# Patient Record
Sex: MALE | Race: WHITE | URBAN - METROPOLITAN AREA
[De-identification: names, ages, dates, MRNs, and addresses within clinical notes are randomized per-mention and may not be internally consistent; named-entity substitution may affect disease eponyms.]

---

## 2019-08-20 ENCOUNTER — APPOINTMENT (RX ONLY)
Dept: URBAN - METROPOLITAN AREA CLINIC 53 | Facility: CLINIC | Age: 69
Setting detail: DERMATOLOGY
End: 2019-08-20

## 2019-08-20 DIAGNOSIS — L81.4 OTHER MELANIN HYPERPIGMENTATION: ICD-10-CM

## 2019-08-20 DIAGNOSIS — Z85.828 PERSONAL HISTORY OF OTHER MALIGNANT NEOPLASM OF SKIN: ICD-10-CM | Status: RESOLVED

## 2019-08-20 DIAGNOSIS — L82.0 INFLAMED SEBORRHEIC KERATOSIS: ICD-10-CM

## 2019-08-20 PROBLEM — F41.9 ANXIETY DISORDER, UNSPECIFIED: Status: ACTIVE | Noted: 2019-08-20

## 2019-08-20 PROBLEM — D48.5 NEOPLASM OF UNCERTAIN BEHAVIOR OF SKIN: Status: ACTIVE | Noted: 2019-08-20

## 2019-08-20 PROCEDURE — ? BIOPSY BY SHAVE METHOD

## 2019-08-20 PROCEDURE — 99202 OFFICE O/P NEW SF 15 MIN: CPT | Mod: 25

## 2019-08-20 PROCEDURE — ? INVENTORY

## 2019-08-20 PROCEDURE — ? COUNSELING

## 2019-08-20 PROCEDURE — 11102 TANGNTL BX SKIN SINGLE LES: CPT | Mod: 59

## 2019-08-20 PROCEDURE — 17110 DESTRUCTION B9 LES UP TO 14: CPT

## 2019-08-20 PROCEDURE — ? LIQUID NITROGEN

## 2019-08-20 ASSESSMENT — LOCATION SIMPLE DESCRIPTION DERM
LOCATION SIMPLE: NOSE
LOCATION SIMPLE: UPPER BACK
LOCATION SIMPLE: RIGHT UPPER BACK
LOCATION SIMPLE: LEFT TEMPLE
LOCATION SIMPLE: CHEST
LOCATION SIMPLE: LEFT FOREHEAD

## 2019-08-20 ASSESSMENT — LOCATION DETAILED DESCRIPTION DERM
LOCATION DETAILED: SUPERIOR THORACIC SPINE
LOCATION DETAILED: RIGHT MEDIAL INFERIOR CHEST
LOCATION DETAILED: LEFT CENTRAL TEMPLE
LOCATION DETAILED: LEFT SUPERIOR FOREHEAD
LOCATION DETAILED: NASAL DORSUM
LOCATION DETAILED: RIGHT MID-UPPER BACK

## 2019-08-20 ASSESSMENT — LOCATION ZONE DERM
LOCATION ZONE: TRUNK
LOCATION ZONE: NOSE
LOCATION ZONE: FACE

## 2019-08-20 NOTE — PROCEDURE: LIQUID NITROGEN
Detail Level: Detailed
Consent: The patient's consent was obtained including but not limited to risks of crusting, scabbing, blistering, scarring, darker or lighter pigmentary change, recurrence, incomplete removal and infection.
Add 52 Modifier (Optional): no
Medical Necessity Clause: This procedure was medically necessary because the lesions that were treated were:
Number Of Freeze-Thaw Cycles: 2 freeze-thaw cycles
Medical Necessity Information: It is in your best interest to select a reason for this procedure from the list below. All of these items fulfill various CMS LCD requirements except the new and changing color options.
Post-Care Instructions: I reviewed with the patient in detail post-care instructions. Patient is to wear sunprotection, and avoid picking at any of the treated lesions. Pt may apply Vaseline to crusted or scabbing areas.

## 2019-08-20 NOTE — PROCEDURE: BIOPSY BY SHAVE METHOD
Billing Type: Third-Party Bill
Lab: 6
Detail Level: Detailed
Biopsy Method: double edge Personna blade
Curettage Text: The wound bed was treated with curettage after the biopsy was performed.
Render Path Notes In Note?: No
Hemostasis: Electrocautery and Aluminum Chloride
Was A Bandage Applied: Yes
Silver Nitrate Text: The wound bed was treated with silver nitrate after the biopsy was performed.
Dressing: bandage
Anesthesia Volume In Cc (Will Not Render If 0): 0.5
Electrodesiccation And Curettage Text: The wound bed was treated with electrodesiccation and curettage after the biopsy was performed.
Type Of Destruction Used: Curettage
Consent: Written consent was obtained and risks were reviewed including but not limited to scarring, infection, bleeding, scabbing, incomplete removal, nerve damage and allergy to anesthesia.
X Size Of Lesion In Cm: 0
Biopsy Type: H and E
Depth Of Biopsy: dermis
Electrodesiccation Text: The wound bed was treated with electrodesiccation after the biopsy was performed.
Notification Instructions: Patient will be notified of biopsy results. However, patient instructed to call the office if not contacted within 2 weeks.
Wound Care: Petrolatum
Anesthesia Type: 1% lidocaine with epinephrine
Lab Facility: 3
Post-Care Instructions: I reviewed with the patient in detail post-care instructions. Patient is to keep the biopsy site dry overnight, and then apply bacitracin twice daily until healed. Patient may apply hydrogen peroxide soaks to remove any crusting.
Cryotherapy Text: The wound bed was treated with cryotherapy after the biopsy was performed.

## 2020-02-21 ENCOUNTER — APPOINTMENT (RX ONLY)
Dept: URBAN - METROPOLITAN AREA CLINIC 53 | Facility: CLINIC | Age: 70
Setting detail: DERMATOLOGY
End: 2020-02-21

## 2020-02-21 DIAGNOSIS — Z41.9 ENCOUNTER FOR PROCEDURE FOR PURPOSES OTHER THAN REMEDYING HEALTH STATE, UNSPECIFIED: ICD-10-CM

## 2020-02-21 PROCEDURE — ? DIAGNOSIS COMMENT

## 2020-02-21 PROCEDURE — ? FILLERS

## 2020-02-21 PROCEDURE — ? BOTOX (U OR CC)

## 2020-02-21 ASSESSMENT — LOCATION DETAILED DESCRIPTION DERM
LOCATION DETAILED: GLABELLA
LOCATION DETAILED: LEFT SUPERIOR MEDIAL FOREHEAD
LOCATION DETAILED: LEFT INFERIOR FOREHEAD
LOCATION DETAILED: RIGHT LOWER CUTANEOUS LIP
LOCATION DETAILED: RIGHT SUPERIOR MEDIAL FOREHEAD
LOCATION DETAILED: LEFT SUPERIOR CENTRAL MALAR CHEEK
LOCATION DETAILED: LEFT INFERIOR TEMPLE
LOCATION DETAILED: LEFT LOWER CUTANEOUS LIP
LOCATION DETAILED: RIGHT INFERIOR MEDIAL FOREHEAD
LOCATION DETAILED: LEFT INFERIOR MEDIAL FOREHEAD
LOCATION DETAILED: RIGHT INFERIOR FOREHEAD
LOCATION DETAILED: RIGHT SUPERIOR CENTRAL MALAR CHEEK
LOCATION DETAILED: RIGHT INFERIOR TEMPLE
LOCATION DETAILED: RIGHT SUPERIOR VERMILION LIP

## 2020-02-21 ASSESSMENT — LOCATION SIMPLE DESCRIPTION DERM
LOCATION SIMPLE: RIGHT TEMPLE
LOCATION SIMPLE: LEFT TEMPLE
LOCATION SIMPLE: RIGHT CHEEK
LOCATION SIMPLE: RIGHT FOREHEAD
LOCATION SIMPLE: LEFT CHEEK
LOCATION SIMPLE: LEFT LIP
LOCATION SIMPLE: GLABELLA
LOCATION SIMPLE: RIGHT LIP
LOCATION SIMPLE: LEFT FOREHEAD

## 2020-02-21 ASSESSMENT — LOCATION ZONE DERM
LOCATION ZONE: FACE
LOCATION ZONE: LIP

## 2020-02-21 NOTE — PROCEDURE: BOTOX (U OR CC)
Dilution (U/0.1 Cc): 1.1
Detail Level: Detailed
Price Per Unit Or Per Cc In $ (Use Numbers Only, No Special Characters Or $): 12.00
Consent: Written consent obtained. Risks include but not limited to lid/brow ptosis, bruising, swelling, diplopia, temporary effect, incomplete chemical denervation.
Post-Care Instructions: Instructions after Botox:\\nDo not lay flat for 4 hours after procedure. Limit physical activity for 24 hours. No heavy lifting (15lbs or greater) or bending (putting head below level of heart) for the remainder of the day. Do not apply pressure or compression to areas treated for 24 hours. Avoid facials or saunas for 24 hours after your treatment. Do not apply heat for 24 hours. Avoid traveling by airplane for 48 hours. Try to avoid make-up and skincare products for the remainder of the day to minimize risk for infection. If you need to apply make-up within 4 hours after your treatment, only use a GENTLE touch to avoid rubbing the treated area.\\n\\nFeel free to shower and go about most other regular daily activities. Be assured that any tiny bumps or marks will go away within a few hours.  Results may take up to 14 days to take full effect. Please wait until the 14 days have passed before assessing if you are pleased with the result. \\n\\nBotox is a temporary procedure. You may find that your treatment results will last approximately 3-4 months. If you maintain your treatment appointments with routine frequency, the duration of each treatment may last longer than 4 months. With routine follow-up treatments, we are able to create the best clinical results for you.
Additional Area 1 Location: mouth
Measure In Units Or Cc's?: units
Quantity Per Injection Site (Units): 2
Quantity Per Injection Site (Units): 15
Quantity Per Injection Site (Units): 12
Quantity Per Injection Site (Units): 4
Quantity Per Injection Site (Units): 10

## 2020-02-21 NOTE — PROCEDURE: FILLERS
Price (Use Numbers Only, No Special Characters Or $): 9818 Price (Use Numbers Only, No Special Characters Or $): 6006

## 2021-08-09 ENCOUNTER — APPOINTMENT (RX ONLY)
Dept: URBAN - METROPOLITAN AREA CLINIC 53 | Facility: CLINIC | Age: 71
Setting detail: DERMATOLOGY
End: 2021-08-09

## 2021-08-09 DIAGNOSIS — L21.8 OTHER SEBORRHEIC DERMATITIS: ICD-10-CM | Status: INADEQUATELY CONTROLLED

## 2021-08-09 PROBLEM — D48.5 NEOPLASM OF UNCERTAIN BEHAVIOR OF SKIN: Status: ACTIVE | Noted: 2021-08-09

## 2021-08-09 PROCEDURE — ? RECOMMENDATIONS

## 2021-08-09 PROCEDURE — ? ADDITIONAL NOTES

## 2021-08-09 PROCEDURE — ? COUNSELING

## 2021-08-09 PROCEDURE — 99213 OFFICE O/P EST LOW 20 MIN: CPT

## 2021-08-09 PROCEDURE — ? PRESCRIPTION

## 2021-08-09 PROCEDURE — ? TREATMENT REGIMEN

## 2021-08-09 RX ORDER — KETOCONAZOLE 20 MG/G
1 CREAM TOPICAL BID
Qty: 1 | Refills: 2 | Status: ERX | COMMUNITY
Start: 2021-08-09

## 2021-08-09 RX ADMIN — KETOCONAZOLE 1: 20 CREAM TOPICAL at 00:00

## 2021-08-09 ASSESSMENT — LOCATION DETAILED DESCRIPTION DERM: LOCATION DETAILED: LEFT INFERIOR CENTRAL MALAR CHEEK

## 2021-08-09 ASSESSMENT — LOCATION SIMPLE DESCRIPTION DERM: LOCATION SIMPLE: LEFT CHEEK

## 2021-08-09 ASSESSMENT — LOCATION ZONE DERM: LOCATION ZONE: FACE

## 2021-08-09 NOTE — PROCEDURE: RECOMMENDATIONS
Recommendation Preamble: Sunscreen daily with SPF 30+
Recommendations (Free Text): Pt to be scheduled to have MOHS procedure.  Pathology in attachments
Render Risk Assessment In Note?: no
Detail Level: Zone

## 2021-08-10 ENCOUNTER — APPOINTMENT (RX ONLY)
Dept: URBAN - METROPOLITAN AREA CLINIC 379 | Facility: CLINIC | Age: 71
Setting detail: DERMATOLOGY
End: 2021-08-10

## 2021-08-19 ENCOUNTER — APPOINTMENT (RX ONLY)
Dept: URBAN - METROPOLITAN AREA CLINIC 53 | Facility: CLINIC | Age: 71
Setting detail: DERMATOLOGY
End: 2021-08-19

## 2021-08-19 DIAGNOSIS — L21.8 OTHER SEBORRHEIC DERMATITIS: ICD-10-CM

## 2021-08-19 PROCEDURE — ? PRESCRIPTION

## 2021-08-19 RX ORDER — TRIAMCINOLONE ACETONIDE 1 MG/G
CREAM TOPICAL BID
Qty: 1 | Refills: 1 | Status: ERX | COMMUNITY
Start: 2021-08-19

## 2021-08-19 RX ADMIN — TRIAMCINOLONE ACETONIDE: 1 CREAM TOPICAL at 00:00

## 2021-08-24 ENCOUNTER — APPOINTMENT (RX ONLY)
Dept: URBAN - METROPOLITAN AREA CLINIC 53 | Facility: CLINIC | Age: 71
Setting detail: DERMATOLOGY
End: 2021-08-24

## 2021-08-24 DIAGNOSIS — L71.8 OTHER ROSACEA: ICD-10-CM

## 2021-08-24 PROCEDURE — ? PRESCRIPTION

## 2021-08-24 RX ORDER — AZELAIC ACID 0.15 G/G
1 GEL TOPICAL BID
Qty: 1 | Refills: 1 | Status: ERX | COMMUNITY
Start: 2021-08-24

## 2021-08-24 RX ADMIN — AZELAIC ACID 1: 0.15 GEL TOPICAL at 00:00

## 2021-09-09 ENCOUNTER — APPOINTMENT (RX ONLY)
Dept: URBAN - METROPOLITAN AREA CLINIC 53 | Facility: CLINIC | Age: 71
Setting detail: DERMATOLOGY
End: 2021-09-09

## 2021-09-09 VITALS — SYSTOLIC BLOOD PRESSURE: 104 MMHG | HEART RATE: 73 BPM | DIASTOLIC BLOOD PRESSURE: 60 MMHG

## 2021-09-09 PROBLEM — C44.319 BASAL CELL CARCINOMA OF SKIN OF OTHER PARTS OF FACE: Status: ACTIVE | Noted: 2021-09-09

## 2021-09-09 PROBLEM — C44.91 BASAL CELL CARCINOMA OF SKIN, UNSPECIFIED: Status: ACTIVE | Noted: 2021-09-09

## 2021-09-09 PROCEDURE — ? REPAIR NOTE

## 2021-09-09 PROCEDURE — 17311 MOHS 1 STAGE H/N/HF/G: CPT

## 2021-09-09 PROCEDURE — ? MOHS SURGERY

## 2021-09-09 PROCEDURE — ? CONSULTATION FOR SURGICAL REPAIR

## 2021-09-09 PROCEDURE — 99213 OFFICE O/P EST LOW 20 MIN: CPT | Mod: 25

## 2021-09-09 PROCEDURE — 13132 CMPLX RPR F/C/C/M/N/AX/G/H/F: CPT

## 2021-09-09 NOTE — PROCEDURE: REPAIR NOTE
Consent: The rationale for the repair was explained to the patient and consent was obtained. The risks, benefits and alternatives to therapy were discussed, including but not limited to, the risks of infection, scarring, bleeding, prolonged wound healing, partial or full flap loss/necrosis, venous congestion, contour deformity/dog ears, hypertrophic/keloid scar, damage to nearby structures, incomplete removal, allergy to anesthesia, nerve injury, need for further procedure, and recurrence were addressed. Prior to the procedure, the treatment site was clearly identified and confirmed by the patient. All components of Universal Protocol/PAUSE Rule completed.

## 2021-09-14 ENCOUNTER — APPOINTMENT (RX ONLY)
Dept: URBAN - METROPOLITAN AREA CLINIC 53 | Facility: CLINIC | Age: 71
Setting detail: DERMATOLOGY
End: 2021-09-14

## 2021-09-14 DIAGNOSIS — D22 MELANOCYTIC NEVI: ICD-10-CM | Status: STABLE

## 2021-09-14 DIAGNOSIS — L81.4 OTHER MELANIN HYPERPIGMENTATION: ICD-10-CM | Status: STABLE

## 2021-09-14 DIAGNOSIS — L57.8 OTHER SKIN CHANGES DUE TO CHRONIC EXPOSURE TO NONIONIZING RADIATION: ICD-10-CM

## 2021-09-14 DIAGNOSIS — L21.8 OTHER SEBORRHEIC DERMATITIS: ICD-10-CM | Status: WORSENING

## 2021-09-14 DIAGNOSIS — L57.0 ACTINIC KERATOSIS: ICD-10-CM

## 2021-09-14 DIAGNOSIS — D18.0 HEMANGIOMA: ICD-10-CM | Status: STABLE

## 2021-09-14 DIAGNOSIS — L82.1 OTHER SEBORRHEIC KERATOSIS: ICD-10-CM | Status: STABLE

## 2021-09-14 PROBLEM — D22.62 MELANOCYTIC NEVI OF LEFT UPPER LIMB, INCLUDING SHOULDER: Status: ACTIVE | Noted: 2021-09-14

## 2021-09-14 PROBLEM — D18.01 HEMANGIOMA OF SKIN AND SUBCUTANEOUS TISSUE: Status: ACTIVE | Noted: 2021-09-14

## 2021-09-14 PROBLEM — L30.9 DERMATITIS, UNSPECIFIED: Status: ACTIVE | Noted: 2021-09-14

## 2021-09-14 PROCEDURE — ? SUNSCREEN RECOMMENDATIONS

## 2021-09-14 PROCEDURE — ? COUNSELING

## 2021-09-14 PROCEDURE — 11102 TANGNTL BX SKIN SINGLE LES: CPT | Mod: 79

## 2021-09-14 PROCEDURE — 17000 DESTRUCT PREMALG LESION: CPT | Mod: 59,79

## 2021-09-14 PROCEDURE — ? BIOPSY BY SHAVE METHOD

## 2021-09-14 PROCEDURE — ? LIQUID NITROGEN

## 2021-09-14 PROCEDURE — ? ADDITIONAL NOTES

## 2021-09-14 PROCEDURE — 99213 OFFICE O/P EST LOW 20 MIN: CPT | Mod: 24,25

## 2021-09-14 PROCEDURE — 17003 DESTRUCT PREMALG LES 2-14: CPT | Mod: 79

## 2021-09-14 PROCEDURE — ? FULL BODY SKIN EXAM

## 2021-09-14 ASSESSMENT — LOCATION DETAILED DESCRIPTION DERM
LOCATION DETAILED: EPIGASTRIC SKIN
LOCATION DETAILED: MID-OCCIPITAL SCALP
LOCATION DETAILED: LEFT SUPERIOR OCCIPITAL SCALP
LOCATION DETAILED: LEFT LATERAL MANDIBULAR CHEEK
LOCATION DETAILED: LEFT SUPERIOR PARIETAL SCALP
LOCATION DETAILED: LEFT CENTRAL MALAR CHEEK
LOCATION DETAILED: LEFT PROXIMAL DORSAL FOREARM
LOCATION DETAILED: RIGHT POSTERIOR SHOULDER
LOCATION DETAILED: LEFT LATERAL TEMPLE
LOCATION DETAILED: LEFT INFERIOR UPPER BACK
LOCATION DETAILED: LEFT INFERIOR CENTRAL MALAR CHEEK

## 2021-09-14 ASSESSMENT — LOCATION SIMPLE DESCRIPTION DERM
LOCATION SIMPLE: LEFT OCCIPITAL SCALP
LOCATION SIMPLE: LEFT UPPER BACK
LOCATION SIMPLE: LEFT CHEEK
LOCATION SIMPLE: SCALP
LOCATION SIMPLE: LEFT FOREARM
LOCATION SIMPLE: LEFT TEMPLE
LOCATION SIMPLE: ABDOMEN
LOCATION SIMPLE: POSTERIOR SCALP
LOCATION SIMPLE: RIGHT SHOULDER

## 2021-09-14 ASSESSMENT — LOCATION ZONE DERM
LOCATION ZONE: ARM
LOCATION ZONE: TRUNK
LOCATION ZONE: FACE
LOCATION ZONE: SCALP

## 2021-09-14 NOTE — PROCEDURE: LIQUID NITROGEN
Render Note In Bullet Format When Appropriate: No
Post-Care Instructions: I reviewed with the patient in detail post-care instructions. Patient is to wear sunprotection, and avoid picking at any of the treated lesions. Pt may apply Vaseline to crusted or scabbing areas.
Duration Of Freeze Thaw-Cycle (Seconds): 3
Render Post-Care Instructions In Note?: yes
Number Of Freeze-Thaw Cycles: 1 freeze-thaw cycle
Detail Level: Detailed
Consent: The patient's consent was obtained including but not limited to risks of crusting, scabbing, blistering, scarring, darker or lighter pigmentary change, recurrence, incomplete removal and infection.

## 2021-09-14 NOTE — PROCEDURE: BIOPSY BY SHAVE METHOD
Detail Level: Detailed
Depth Of Biopsy: dermis
Was A Bandage Applied: Yes
Size Of Lesion In Cm: 0
Biopsy Type: H and E
Biopsy Method: double edge Personna blade
Anesthesia Type: 1% lidocaine with epinephrine
Anesthesia Volume In Cc (Will Not Render If 0): 0.5
Hemostasis: Aluminum Chloride and Electrocautery
Wound Care: Petrolatum
Dressing: bandage
Type Of Destruction Used: Electrodesiccation
Curettage Text: The wound bed was treated with curettage after the biopsy was performed.
Cryotherapy Text: The wound bed was treated with cryotherapy after the biopsy was performed.
Electrodesiccation Text: The wound bed was treated with electrodesiccation after the biopsy was performed.
Electrodesiccation And Curettage Text: The wound bed was treated with electrodesiccation and curettage after the biopsy was performed.
Silver Nitrate Text: The wound bed was treated with silver nitrate after the biopsy was performed.
Lab: 6
Lab Facility: 3
Render Path Notes In Note?: No
Consent: Written consent was obtained and risks were reviewed including but not limited to scarring, infection, bleeding, scabbing, incomplete removal, nerve damage and allergy to anesthesia.
Post-Care Instructions: I reviewed with the patient in detail post-care instructions. Patient is to keep the biopsy site dry overnight, and then apply Vaseline or Aquaphor daily with bandage until healed. Wash daily with gentle soap and water. Patient may apply hydrogen peroxide soaks to remove any crusting.
Notification Instructions: Patient will be notified of biopsy results. However, patient instructed to call the office if not contacted within 2 weeks.
Billing Type: Third-Party Bill
Information: Selecting Yes will display possible errors in your note based on the variables you have selected. This validation is only offered as a suggestion for you. PLEASE NOTE THAT THE VALIDATION TEXT WILL BE REMOVED WHEN YOU FINALIZE YOUR NOTE. IF YOU WANT TO FAX A PRELIMINARY NOTE YOU WILL NEED TO TOGGLE THIS TO 'NO' IF YOU DO NOT WANT IT IN YOUR FAXED NOTE.

## 2021-09-20 ENCOUNTER — RX ONLY (OUTPATIENT)
Age: 71
Setting detail: RX ONLY
End: 2021-09-20

## 2021-09-20 RX ORDER — PIMECROLIMUS 10 MG/G
1 CREAM TOPICAL BID
Qty: 60 | Refills: 0 | Status: ERX | COMMUNITY
Start: 2021-09-20

## 2021-09-23 ENCOUNTER — APPOINTMENT (RX ONLY)
Dept: URBAN - METROPOLITAN AREA CLINIC 53 | Facility: CLINIC | Age: 71
Setting detail: DERMATOLOGY
End: 2021-09-23

## 2021-09-23 PROBLEM — L21.9 SEBORRHEIC DERMATITIS, UNSPECIFIED: Status: ACTIVE | Noted: 2021-09-23

## 2021-09-23 PROCEDURE — ? PRESCRIPTION

## 2021-09-23 RX ORDER — NYSTATIN AND TRIAMCINOLONE ACETONIDE 100000; 1 [USP'U]/G; MG/G
CREAM TOPICAL BID
Qty: 60 | Refills: 2 | Status: ERX | COMMUNITY
Start: 2021-09-23

## 2021-09-23 RX ADMIN — NYSTATIN AND TRIAMCINOLONE ACETONIDE: 100000; 1 CREAM TOPICAL at 00:00

## 2021-09-24 ENCOUNTER — APPOINTMENT (RX ONLY)
Dept: URBAN - METROPOLITAN AREA CLINIC 53 | Facility: CLINIC | Age: 71
Setting detail: DERMATOLOGY
End: 2021-09-24

## 2021-09-24 DIAGNOSIS — Z48.817 ENCOUNTER FOR SURGICAL AFTERCARE FOLLOWING SURGERY ON THE SKIN AND SUBCUTANEOUS TISSUE: ICD-10-CM

## 2021-09-24 PROCEDURE — 99024 POSTOP FOLLOW-UP VISIT: CPT

## 2021-09-24 PROCEDURE — ? POST-OP WOUND CHECK

## 2021-09-24 ASSESSMENT — LOCATION SIMPLE DESCRIPTION DERM: LOCATION SIMPLE: LEFT FOREHEAD

## 2021-09-24 ASSESSMENT — LOCATION ZONE DERM: LOCATION ZONE: FACE

## 2021-09-24 ASSESSMENT — LOCATION DETAILED DESCRIPTION DERM: LOCATION DETAILED: LEFT LATERAL FOREHEAD

## 2021-09-24 NOTE — PROCEDURE: POST-OP WOUND CHECK
Detail Level: Detailed
Add 74586 Cpt? (Important Note: In 2017 The Use Of 63194 Is Being Tracked By Cms To Determine Future Global Period Reimbursement For Global Periods): yes

## 2021-10-15 ENCOUNTER — APPOINTMENT (RX ONLY)
Dept: URBAN - METROPOLITAN AREA CLINIC 61 | Facility: CLINIC | Age: 71
Setting detail: DERMATOLOGY
End: 2021-10-15

## 2021-10-15 DIAGNOSIS — Z48.817 ENCOUNTER FOR SURGICAL AFTERCARE FOLLOWING SURGERY ON THE SKIN AND SUBCUTANEOUS TISSUE: ICD-10-CM

## 2021-10-15 PROCEDURE — 99024 POSTOP FOLLOW-UP VISIT: CPT

## 2021-10-15 PROCEDURE — ? POST-OP WOUND CHECK

## 2021-10-15 ASSESSMENT — LOCATION DETAILED DESCRIPTION DERM: LOCATION DETAILED: LEFT LATERAL FOREHEAD

## 2021-10-15 ASSESSMENT — LOCATION SIMPLE DESCRIPTION DERM: LOCATION SIMPLE: LEFT FOREHEAD

## 2021-10-15 ASSESSMENT — LOCATION ZONE DERM: LOCATION ZONE: FACE

## 2021-10-15 NOTE — PROCEDURE: POST-OP WOUND CHECK
Detail Level: Detailed
Add 05017 Cpt? (Important Note: In 2017 The Use Of 33684 Is Being Tracked By Cms To Determine Future Global Period Reimbursement For Global Periods): yes
Wound Evaluated By: Rika Arteaga by Dr Beckford.

## 2021-11-11 ENCOUNTER — APPOINTMENT (RX ONLY)
Dept: URBAN - METROPOLITAN AREA CLINIC 53 | Facility: CLINIC | Age: 71
Setting detail: DERMATOLOGY
End: 2021-11-11

## 2021-11-11 DIAGNOSIS — D18.0 HEMANGIOMA: ICD-10-CM | Status: STABLE

## 2021-11-11 DIAGNOSIS — L57.8 OTHER SKIN CHANGES DUE TO CHRONIC EXPOSURE TO NONIONIZING RADIATION: ICD-10-CM

## 2021-11-11 DIAGNOSIS — L81.4 OTHER MELANIN HYPERPIGMENTATION: ICD-10-CM | Status: STABLE

## 2021-11-11 DIAGNOSIS — L82.1 OTHER SEBORRHEIC KERATOSIS: ICD-10-CM | Status: STABLE

## 2021-11-11 DIAGNOSIS — L57.0 ACTINIC KERATOSIS: ICD-10-CM

## 2021-11-11 DIAGNOSIS — Z85.828 PERSONAL HISTORY OF OTHER MALIGNANT NEOPLASM OF SKIN: ICD-10-CM | Status: RESOLVED

## 2021-11-11 DIAGNOSIS — D22 MELANOCYTIC NEVI: ICD-10-CM | Status: STABLE

## 2021-11-11 PROBLEM — D18.01 HEMANGIOMA OF SKIN AND SUBCUTANEOUS TISSUE: Status: ACTIVE | Noted: 2021-11-11

## 2021-11-11 PROBLEM — D48.5 NEOPLASM OF UNCERTAIN BEHAVIOR OF SKIN: Status: ACTIVE | Noted: 2021-11-11

## 2021-11-11 PROBLEM — D22.62 MELANOCYTIC NEVI OF LEFT UPPER LIMB, INCLUDING SHOULDER: Status: ACTIVE | Noted: 2021-11-11

## 2021-11-11 PROCEDURE — ? COUNSELING

## 2021-11-11 PROCEDURE — ? FULL BODY SKIN EXAM

## 2021-11-11 PROCEDURE — ? SUNSCREEN RECOMMENDATIONS

## 2021-11-11 PROCEDURE — 99213 OFFICE O/P EST LOW 20 MIN: CPT | Mod: 25

## 2021-11-11 PROCEDURE — ? ADDITIONAL NOTES

## 2021-11-11 PROCEDURE — ? LIQUID NITROGEN

## 2021-11-11 PROCEDURE — 17000 DESTRUCT PREMALG LESION: CPT | Mod: 59

## 2021-11-11 PROCEDURE — 11102 TANGNTL BX SKIN SINGLE LES: CPT

## 2021-11-11 PROCEDURE — 11103 TANGNTL BX SKIN EA SEP/ADDL: CPT

## 2021-11-11 PROCEDURE — 17003 DESTRUCT PREMALG LES 2-14: CPT

## 2021-11-11 PROCEDURE — ? BIOPSY BY SHAVE METHOD

## 2021-11-11 ASSESSMENT — LOCATION SIMPLE DESCRIPTION DERM
LOCATION SIMPLE: RIGHT EAR
LOCATION SIMPLE: ABDOMEN
LOCATION SIMPLE: LEFT FOREHEAD
LOCATION SIMPLE: LEFT UPPER BACK
LOCATION SIMPLE: LEFT CHEEK
LOCATION SIMPLE: RIGHT FOREHEAD
LOCATION SIMPLE: RIGHT SHOULDER
LOCATION SIMPLE: LEFT EAR
LOCATION SIMPLE: LEFT FOREARM

## 2021-11-11 ASSESSMENT — LOCATION DETAILED DESCRIPTION DERM
LOCATION DETAILED: LEFT INFERIOR LATERAL FOREHEAD
LOCATION DETAILED: RIGHT POSTERIOR SHOULDER
LOCATION DETAILED: LEFT CENTRAL MALAR CHEEK
LOCATION DETAILED: EPIGASTRIC SKIN
LOCATION DETAILED: LEFT SUPERIOR PREAURICULAR CHEEK
LOCATION DETAILED: RIGHT INFERIOR FOREHEAD
LOCATION DETAILED: RIGHT INFERIOR HELIX
LOCATION DETAILED: LEFT SUPERIOR FOREHEAD
LOCATION DETAILED: LEFT INFERIOR UPPER BACK
LOCATION DETAILED: RIGHT INFERIOR MEDIAL FOREHEAD
LOCATION DETAILED: LEFT TRAGUS
LOCATION DETAILED: LEFT SUPERIOR MEDIAL FOREHEAD
LOCATION DETAILED: LEFT SUPERIOR CENTRAL MALAR CHEEK
LOCATION DETAILED: LEFT PROXIMAL DORSAL FOREARM

## 2021-11-11 ASSESSMENT — LOCATION ZONE DERM
LOCATION ZONE: EAR
LOCATION ZONE: FACE
LOCATION ZONE: TRUNK
LOCATION ZONE: ARM

## 2021-11-11 NOTE — PROCEDURE: BIOPSY BY SHAVE METHOD
Detail Level: Detailed
Depth Of Biopsy: dermis
Was A Bandage Applied: Yes
Size Of Lesion In Cm: 0.5
Biopsy Type: H and E
Biopsy Method: double edge Personna blade
Anesthesia Type: 1% lidocaine with epinephrine
Additional Anesthesia Volume In Cc (Will Not Render If 0): 0
Hemostasis: Aluminum Chloride and Electrocautery
Wound Care: Petrolatum
Dressing: bandage
Type Of Destruction Used: Electrodesiccation
Curettage Text: The wound bed was treated with curettage after the biopsy was performed.
Cryotherapy Text: The wound bed was treated with cryotherapy after the biopsy was performed.
Electrodesiccation Text: The wound bed was treated with electrodesiccation after the biopsy was performed.
Electrodesiccation And Curettage Text: The wound bed was treated with electrodesiccation and curettage after the biopsy was performed.
Silver Nitrate Text: The wound bed was treated with silver nitrate after the biopsy was performed.
Lab: 6
Lab Facility: 3
Render Path Notes In Note?: No
Consent: Written consent was obtained and risks were reviewed including but not limited to scarring, infection, bleeding, scabbing, incomplete removal, nerve damage and allergy to anesthesia.
Post-Care Instructions: I reviewed with the patient in detail post-care instructions. Patient is to keep the biopsy site dry overnight, and then apply Vaseline or Aquaphor daily with bandage until healed. Wash daily with gentle soap and water. Patient may apply hydrogen peroxide soaks to remove any crusting.
Notification Instructions: Patient will be notified of biopsy results. However, patient instructed to call the office if not contacted within 2 weeks.
Billing Type: Third-Party Bill
Information: Selecting Yes will display possible errors in your note based on the variables you have selected. This validation is only offered as a suggestion for you. PLEASE NOTE THAT THE VALIDATION TEXT WILL BE REMOVED WHEN YOU FINALIZE YOUR NOTE. IF YOU WANT TO FAX A PRELIMINARY NOTE YOU WILL NEED TO TOGGLE THIS TO 'NO' IF YOU DO NOT WANT IT IN YOUR FAXED NOTE.
X Size Of Lesion In Cm: 0.3

## 2021-11-11 NOTE — PROCEDURE: LIQUID NITROGEN
Render Post-Care Instructions In Note?: yes
Number Of Freeze-Thaw Cycles: 1 freeze-thaw cycle
Detail Level: Detailed
Consent: The patient's consent was obtained including but not limited to risks of crusting, scabbing, blistering, scarring, darker or lighter pigmentary change, recurrence, incomplete removal and infection.
Render Note In Bullet Format When Appropriate: No
Post-Care Instructions: I reviewed with the patient in detail post-care instructions. Patient is to wear sunprotection, and avoid picking at any of the treated lesions. Pt may apply Vaseline to crusted or scabbing areas.
Duration Of Freeze Thaw-Cycle (Seconds): 3

## 2022-04-07 ENCOUNTER — NON-APPOINTMENT (OUTPATIENT)
Age: 72
End: 2022-04-07

## 2022-04-12 ENCOUNTER — APPOINTMENT (OUTPATIENT)
Dept: ORTHOPEDIC SURGERY | Facility: CLINIC | Age: 72
End: 2022-04-12
Payer: MEDICARE

## 2022-04-12 VITALS
WEIGHT: 165 LBS | HEART RATE: 74 BPM | HEIGHT: 67 IN | BODY MASS INDEX: 25.9 KG/M2 | DIASTOLIC BLOOD PRESSURE: 81 MMHG | SYSTOLIC BLOOD PRESSURE: 137 MMHG

## 2022-04-12 DIAGNOSIS — M54.6 PAIN IN THORACIC SPINE: ICD-10-CM

## 2022-04-12 PROCEDURE — 72100 X-RAY EXAM L-S SPINE 2/3 VWS: CPT

## 2022-04-12 PROCEDURE — 99205 OFFICE O/P NEW HI 60 MIN: CPT

## 2022-04-16 ENCOUNTER — APPOINTMENT (OUTPATIENT)
Dept: CT IMAGING | Facility: CLINIC | Age: 72
End: 2022-04-16
Payer: MEDICARE

## 2022-04-16 ENCOUNTER — RESULT REVIEW (OUTPATIENT)
Age: 72
End: 2022-04-16

## 2022-04-16 ENCOUNTER — OUTPATIENT (OUTPATIENT)
Dept: OUTPATIENT SERVICES | Facility: HOSPITAL | Age: 72
LOS: 1 days | End: 2022-04-16
Payer: MEDICARE

## 2022-04-16 DIAGNOSIS — M51.36 OTHER INTERVERTEBRAL DISC DEGENERATION, LUMBAR REGION: ICD-10-CM

## 2022-04-16 PROCEDURE — 72131 CT LUMBAR SPINE W/O DYE: CPT | Mod: ME

## 2022-04-16 PROCEDURE — G1004: CPT

## 2022-04-16 PROCEDURE — 72131 CT LUMBAR SPINE W/O DYE: CPT | Mod: 26,ME

## 2022-04-20 ENCOUNTER — APPOINTMENT (OUTPATIENT)
Dept: ORTHOPEDIC SURGERY | Facility: CLINIC | Age: 72
End: 2022-04-20
Payer: MEDICARE

## 2022-04-20 PROCEDURE — 99442: CPT | Mod: 95

## 2022-04-27 ENCOUNTER — OUTPATIENT (OUTPATIENT)
Dept: OUTPATIENT SERVICES | Facility: HOSPITAL | Age: 72
LOS: 1 days | End: 2022-04-27
Payer: MEDICARE

## 2022-04-27 VITALS
OXYGEN SATURATION: 95 % | SYSTOLIC BLOOD PRESSURE: 133 MMHG | HEART RATE: 82 BPM | WEIGHT: 167.99 LBS | HEIGHT: 67 IN | DIASTOLIC BLOOD PRESSURE: 90 MMHG | TEMPERATURE: 98 F | RESPIRATION RATE: 16 BRPM

## 2022-04-27 DIAGNOSIS — Z98.890 OTHER SPECIFIED POSTPROCEDURAL STATES: Chronic | ICD-10-CM

## 2022-04-27 DIAGNOSIS — Z29.9 ENCOUNTER FOR PROPHYLACTIC MEASURES, UNSPECIFIED: ICD-10-CM

## 2022-04-27 DIAGNOSIS — Z98.1 ARTHRODESIS STATUS: Chronic | ICD-10-CM

## 2022-04-27 DIAGNOSIS — Z96.89 PRESENCE OF OTHER SPECIFIED FUNCTIONAL IMPLANTS: Chronic | ICD-10-CM

## 2022-04-27 DIAGNOSIS — E89.2 POSTPROCEDURAL HYPOPARATHYROIDISM: Chronic | ICD-10-CM

## 2022-04-27 DIAGNOSIS — Z98.49 CATARACT EXTRACTION STATUS, UNSPECIFIED EYE: Chronic | ICD-10-CM

## 2022-04-27 DIAGNOSIS — M51.36 OTHER INTERVERTEBRAL DISC DEGENERATION, LUMBAR REGION: ICD-10-CM

## 2022-04-27 DIAGNOSIS — E89.0 POSTPROCEDURAL HYPOTHYROIDISM: Chronic | ICD-10-CM

## 2022-04-27 DIAGNOSIS — Z01.818 ENCOUNTER FOR OTHER PREPROCEDURAL EXAMINATION: ICD-10-CM

## 2022-04-27 DIAGNOSIS — F41.9 ANXIETY DISORDER, UNSPECIFIED: ICD-10-CM

## 2022-04-27 DIAGNOSIS — M43.16 SPONDYLOLISTHESIS, LUMBAR REGION: ICD-10-CM

## 2022-04-27 DIAGNOSIS — Z90.49 ACQUIRED ABSENCE OF OTHER SPECIFIED PARTS OF DIGESTIVE TRACT: Chronic | ICD-10-CM

## 2022-04-27 DIAGNOSIS — Z90.79 ACQUIRED ABSENCE OF OTHER GENITAL ORGAN(S): Chronic | ICD-10-CM

## 2022-04-27 DIAGNOSIS — M54.6 PAIN IN THORACIC SPINE: ICD-10-CM

## 2022-04-27 LAB
BLD GP AB SCN SERPL QL: NEGATIVE — SIGNIFICANT CHANGE UP
RH IG SCN BLD-IMP: POSITIVE — SIGNIFICANT CHANGE UP

## 2022-04-27 PROCEDURE — 87641 MR-STAPH DNA AMP PROBE: CPT

## 2022-04-27 PROCEDURE — 87640 STAPH A DNA AMP PROBE: CPT

## 2022-04-27 PROCEDURE — 86901 BLOOD TYPING SEROLOGIC RH(D): CPT

## 2022-04-27 PROCEDURE — 86850 RBC ANTIBODY SCREEN: CPT

## 2022-04-27 PROCEDURE — G0463: CPT

## 2022-04-27 PROCEDURE — 86900 BLOOD TYPING SEROLOGIC ABO: CPT

## 2022-04-27 RX ORDER — CEFAZOLIN SODIUM 1 G
2000 VIAL (EA) INJECTION ONCE
Refills: 0 | Status: DISCONTINUED | OUTPATIENT
Start: 2022-05-03 | End: 2022-05-10

## 2022-04-27 NOTE — H&P PST ADULT - ASSESSMENT
CATRACHITAI VTE 2.0 SCORE [CLOT updated 2019]    AGE RELATED RISK FACTORS                                                       MOBILITY RELATED FACTORS  [ ] Age 41-60 years                                            (1 Point)                    [ ] Bed rest                                                        (1 Point)  [ ] Age: 61-74 years                                           (2 Points)                  [ ] Plaster cast                                                   (2 Points)  [ ] Age= 75 years                                              (3 Points)                    [ ] Bed bound for more than 72 hours                 (2 Points)    DISEASE RELATED RISK FACTORS                                               GENDER SPECIFIC FACTORS  [ ] Edema in the lower extremities                       (1 Point)              [ ] Pregnancy                                                     (1 Point)  [ ] Varicose veins                                               (1 Point)                     [ ] Post-partum < 6 weeks                                   (1 Point)             [ ] BMI > 25 Kg/m2                                            (1 Point)                     [ ] Hormonal therapy  or oral contraception          (1 Point)                 [ ] Sepsis (in the previous month)                        (1 Point)               [ ] History of pregnancy complications                 (1 point)  [ ] Pneumonia or serious lung disease                                               [ ] Unexplained or recurrent                     (1 Point)           (in the previous month)                               (1 Point)  [ ] Abnormal pulmonary function test                     (1 Point)                 SURGERY RELATED RISK FACTORS  [ ] Acute myocardial infarction                              (1 Point)               [ ]  Section                                             (1 Point)  [ ] Congestive heart failure (in the previous month)  (1 Point)      [ ] Minor surgery                                                  (1 Point)   [ ] Inflammatory bowel disease                             (1 Point)               [ ] Arthroscopic surgery                                        (2 Points)  [ ] Central venous access                                      (2 Points)                [ ] General surgery lasting more than 45 minutes (2 points)  [ ] Malignancy- Present or previous                   (2 Points)                [ ] Elective arthroplasty                                         (5 points)    [ ] Stroke (in the previous month)                          (5 Points)                                                                                                                                                           HEMATOLOGY RELATED FACTORS                                                 TRAUMA RELATED RISK FACTORS  [ ] Prior episodes of VTE                                     (3 Points)                [ ] Fracture of the hip, pelvis, or leg                       (5 Points)  [ ] Positive family history for VTE                         (3 Points)             [ ] Acute spinal cord injury (in the previous month)  (5 Points)  [ ] Prothrombin 87181 A                                     (3 Points)               [ ] Paralysis  (less than 1 month)                             (5 Points)  [ ] Factor V Leiden                                             (3 Points)                  [ ] Multiple Trauma within 1 month                        (5 Points)  [ ] Lupus anticoagulants                                     (3 Points)                                                           [ ] Anticardiolipin antibodies                               (3 Points)                                                       [ ] High homocysteine in the blood                      (3 Points)                                             [ ] Other congenital or acquired thrombophilia      (3 Points)                                                [ ] Heparin induced thrombocytopenia                  (3 Points)                                     Total Score [          ] CATRACHITAI VTE 2.0 SCORE [CLOT updated 2019]    AGE RELATED RISK FACTORS                                                       MOBILITY RELATED FACTORS  [ ] Age 41-60 years                                            (1 Point)                    [ ] Bed rest                                                        (1 Point)  [ ] Age: 61-74 years                                           (2 Points)                  [ ] Plaster cast                                                   (2 Points)  [ ] Age= 75 years                                              (3 Points)                    [ ] Bed bound for more than 72 hours                 (2 Points)    DISEASE RELATED RISK FACTORS                                               GENDER SPECIFIC FACTORS  [ ] Edema in the lower extremities                       (1 Point)              [ ] Pregnancy                                                     (1 Point)  [ ] Varicose veins                                               (1 Point)                     [ ] Post-partum < 6 weeks                                   (1 Point)             [ ] BMI > 25 Kg/m2                                            (1 Point)                     [ ] Hormonal therapy  or oral contraception          (1 Point)                 [ ] Sepsis (in the previous month)                        (1 Point)               [ ] History of pregnancy complications                 (1 point)  [ ] Pneumonia or serious lung disease                                               [ ] Unexplained or recurrent                     (1 Point)           (in the previous month)                               (1 Point)  [ ] Abnormal pulmonary function test                     (1 Point)                 SURGERY RELATED RISK FACTORS  [ ] Acute myocardial infarction                              (1 Point)               [ ]  Section                                             (1 Point)  [ ] Congestive heart failure (in the previous month)  (1 Point)      [ ] Minor surgery                                                  (1 Point)   [ ] Inflammatory bowel disease                             (1 Point)               [ ] Arthroscopic surgery                                        (2 Points)  [ ] Central venous access                                      (2 Points)                [ ] General surgery lasting more than 45 minutes (2 points)  [ ] Malignancy- Present or previous                   (2 Points)                [ ] Elective arthroplasty                                         (5 points)    [ ] Stroke (in the previous month)                          (5 Points)                                                                                                                                                           HEMATOLOGY RELATED FACTORS                                                 TRAUMA RELATED RISK FACTORS  [ ] Prior episodes of VTE                                     (3 Points)                [ ] Fracture of the hip, pelvis, or leg                       (5 Points)  [ ] Positive family history for VTE                         (3 Points)             [ ] Acute spinal cord injury (in the previous month)  (5 Points)  [ ] Prothrombin 33230 A                                     (3 Points)               [ ] Paralysis  (less than 1 month)                             (5 Points)  [ ] Factor V Leiden                                             (3 Points)                  [ ] Multiple Trauma within 1 month                        (5 Points)  [ ] Lupus anticoagulants                                     (3 Points)                                                           [ ] Anticardiolipin antibodies                               (3 Points)                                                       [ ] High homocysteine in the blood                      (3 Points)                                             [ ] Other congenital or acquired thrombophilia      (3 Points)                                                [ ] Heparin induced thrombocytopenia                  (3 Points)                                     Total Score [  4        ]

## 2022-04-27 NOTE — H&P PST ADULT - HISTORY OF PRESENT ILLNESS
71 yr old male with PMH of  71 yr old male with PMH of Anxiety, depression, HLD, GERD, BPH, s/p thyroidectomy, s/p squamous cell carcinoma s/p excision on nose and forehead 2021, Intestinal obstruction s/p colectomy, s/p prostatectomy, s/p cervical spine fusion 2007, lumbar spine fusion 2008, spinal cord stimulator 2020 & revision 2021. Pt reports more than 10 yrs of mid to low back pain (w/ radiating pain, tingling and numbness to right LE) with multiple surgeries and spinal cord stimulator to help with pain, no relief noted. Pt was on narcotic pain management with no relief either. Pt's current pain scale 8/10 at rest, 10/10 when active. Pt evaluated by Dr. Acuña for a scheduled L4-L5 Posterior Lumbar laminectomy, L2-L3 extension of fusion, L2-L3 level FRO L3-S1 and prone lateral lumbar interbody fusion L2-L3 on 5/11/22. Pt denies recent travel, sick contact or recent covid infection.     **Covid swab on 5/8/22 at [facility]

## 2022-04-27 NOTE — H&P PST ADULT - FALL HARM RISK - UNIVERSAL INTERVENTIONS
Bed in lowest position, wheels locked, appropriate side rails in place/Call bell, personal items and telephone in reach/Instruct patient to call for assistance before getting out of bed or chair/Non-slip footwear when patient is out of bed/[place] to call system/Physically safe environment - no spills, clutter or unnecessary equipment/Purposeful Proactive Rounding/Room/bathroom lighting operational, light cord in reach

## 2022-04-27 NOTE — H&P PST ADULT - NSICDXPASTSURGICALHX_GEN_ALL_CORE_FT
PAST SURGICAL HISTORY:  H/O foot surgery right 2014    H/O prostatectomy 2008    H/O squamous cell carcinoma excision nose (~5-6 yrs ago) and forehead 2021    H/O ventral hernia repair umbilical/incisional repair 2016    History of ankle surgery 1992    S/P cataract surgery bilateral    S/P cervical spinal fusion 2007    S/P colectomy 1997    S/P insertion of spinal cord stimulator right 7/2021, 1st 2020    S/P lumbar spinal fusion 2008    S/P meniscectomy left 10/2019    S/P parathyroidectomy right    S/P repair of hydrocele right 2010    S/P thyroidectomy bilateral 2006

## 2022-04-27 NOTE — H&P PST ADULT - NSANTHOSAYNRD_GEN_A_CORE
No. IGNACIO screening performed.  STOP BANG Legend: 0-2 = LOW Risk; 3-4 = INTERMEDIATE Risk; 5-8 = HIGH Risk

## 2022-04-27 NOTE — H&P PST ADULT - NSICDXPASTMEDICALHX_GEN_ALL_CORE_FT
PAST MEDICAL HISTORY:  Anxiety and depression     BPH (benign prostatic hyperplasia)     GERD (gastroesophageal reflux disease)     HLD (hyperlipidemia)     Peyronie's disease     SCC (squamous cell carcinoma) of nose

## 2022-04-27 NOTE — H&P PST ADULT - PROBLEM SELECTOR PLAN 1
scheduled L4-L5 Posterior Lumbar laminectomy, L2-L3 extension of fusion, L2-L3 level FRO L3-S1 and prone lateral lumbar interbody fusion L2-L3 on 5/11/22.  -preop instructions given  -Labs: CMP, CBC, A1c form PCP in chart  T&S, MSSA/MRSA done in PST  _Cardiac clearance in chart along with Stress test    -c/w gabapentin, synthroid

## 2022-04-28 ENCOUNTER — APPOINTMENT (OUTPATIENT)
Dept: ORTHOPEDIC SURGERY | Facility: CLINIC | Age: 72
End: 2022-04-28

## 2022-04-28 LAB
MRSA PCR RESULT.: SIGNIFICANT CHANGE UP
S AUREUS DNA NOSE QL NAA+PROBE: SIGNIFICANT CHANGE UP

## 2022-04-28 PROCEDURE — ZZZZZ: CPT

## 2022-04-30 ENCOUNTER — OUTPATIENT (OUTPATIENT)
Dept: OUTPATIENT SERVICES | Facility: HOSPITAL | Age: 72
LOS: 1 days | End: 2022-04-30

## 2022-04-30 DIAGNOSIS — Z98.890 OTHER SPECIFIED POSTPROCEDURAL STATES: Chronic | ICD-10-CM

## 2022-04-30 DIAGNOSIS — Z98.1 ARTHRODESIS STATUS: Chronic | ICD-10-CM

## 2022-04-30 DIAGNOSIS — E89.0 POSTPROCEDURAL HYPOTHYROIDISM: Chronic | ICD-10-CM

## 2022-04-30 DIAGNOSIS — E89.2 POSTPROCEDURAL HYPOPARATHYROIDISM: Chronic | ICD-10-CM

## 2022-04-30 DIAGNOSIS — Z90.79 ACQUIRED ABSENCE OF OTHER GENITAL ORGAN(S): Chronic | ICD-10-CM

## 2022-04-30 DIAGNOSIS — Z96.89 PRESENCE OF OTHER SPECIFIED FUNCTIONAL IMPLANTS: Chronic | ICD-10-CM

## 2022-04-30 DIAGNOSIS — Z98.49 CATARACT EXTRACTION STATUS, UNSPECIFIED EYE: Chronic | ICD-10-CM

## 2022-04-30 DIAGNOSIS — Z11.52 ENCOUNTER FOR SCREENING FOR COVID-19: ICD-10-CM

## 2022-04-30 DIAGNOSIS — Z90.49 ACQUIRED ABSENCE OF OTHER SPECIFIED PARTS OF DIGESTIVE TRACT: Chronic | ICD-10-CM

## 2022-04-30 PROBLEM — E78.5 HYPERLIPIDEMIA, UNSPECIFIED: Chronic | Status: ACTIVE | Noted: 2022-04-27

## 2022-04-30 PROBLEM — C44.92 SQUAMOUS CELL CARCINOMA OF SKIN, UNSPECIFIED: Chronic | Status: ACTIVE | Noted: 2022-04-27

## 2022-04-30 PROBLEM — N48.6 INDURATION PENIS PLASTICA: Chronic | Status: ACTIVE | Noted: 2022-04-27

## 2022-04-30 PROBLEM — N40.0 BENIGN PROSTATIC HYPERPLASIA WITHOUT LOWER URINARY TRACT SYMPTOMS: Chronic | Status: ACTIVE | Noted: 2022-04-27

## 2022-04-30 PROBLEM — K21.9 GASTRO-ESOPHAGEAL REFLUX DISEASE WITHOUT ESOPHAGITIS: Chronic | Status: ACTIVE | Noted: 2022-04-27

## 2022-04-30 PROBLEM — F41.9 ANXIETY DISORDER, UNSPECIFIED: Chronic | Status: ACTIVE | Noted: 2022-04-27

## 2022-04-30 LAB — SARS-COV-2 RNA SPEC QL NAA+PROBE: SIGNIFICANT CHANGE UP

## 2022-05-02 ENCOUNTER — TRANSCRIPTION ENCOUNTER (OUTPATIENT)
Age: 72
End: 2022-05-02

## 2022-05-03 ENCOUNTER — TRANSCRIPTION ENCOUNTER (OUTPATIENT)
Age: 72
End: 2022-05-03

## 2022-05-03 ENCOUNTER — INPATIENT (INPATIENT)
Facility: HOSPITAL | Age: 72
LOS: 6 days | Discharge: HOME | DRG: 454 | End: 2022-05-10
Attending: ORTHOPAEDIC SURGERY | Admitting: ORTHOPAEDIC SURGERY
Payer: MEDICARE

## 2022-05-03 ENCOUNTER — APPOINTMENT (OUTPATIENT)
Dept: ORTHOPEDIC SURGERY | Facility: HOSPITAL | Age: 72
End: 2022-05-03

## 2022-05-03 VITALS
HEART RATE: 66 BPM | HEIGHT: 67 IN | RESPIRATION RATE: 18 BRPM | WEIGHT: 167.99 LBS | SYSTOLIC BLOOD PRESSURE: 158 MMHG | DIASTOLIC BLOOD PRESSURE: 91 MMHG | OXYGEN SATURATION: 97 % | TEMPERATURE: 98 F

## 2022-05-03 DIAGNOSIS — M43.16 SPONDYLOLISTHESIS, LUMBAR REGION: ICD-10-CM

## 2022-05-03 DIAGNOSIS — Z98.1 ARTHRODESIS STATUS: Chronic | ICD-10-CM

## 2022-05-03 DIAGNOSIS — Z96.89 PRESENCE OF OTHER SPECIFIED FUNCTIONAL IMPLANTS: Chronic | ICD-10-CM

## 2022-05-03 DIAGNOSIS — Z98.890 OTHER SPECIFIED POSTPROCEDURAL STATES: Chronic | ICD-10-CM

## 2022-05-03 DIAGNOSIS — Z90.79 ACQUIRED ABSENCE OF OTHER GENITAL ORGAN(S): Chronic | ICD-10-CM

## 2022-05-03 DIAGNOSIS — Z90.49 ACQUIRED ABSENCE OF OTHER SPECIFIED PARTS OF DIGESTIVE TRACT: Chronic | ICD-10-CM

## 2022-05-03 DIAGNOSIS — Z98.49 CATARACT EXTRACTION STATUS, UNSPECIFIED EYE: Chronic | ICD-10-CM

## 2022-05-03 DIAGNOSIS — M54.6 PAIN IN THORACIC SPINE: ICD-10-CM

## 2022-05-03 DIAGNOSIS — E89.2 POSTPROCEDURAL HYPOPARATHYROIDISM: Chronic | ICD-10-CM

## 2022-05-03 DIAGNOSIS — E89.0 POSTPROCEDURAL HYPOTHYROIDISM: Chronic | ICD-10-CM

## 2022-05-03 LAB
ANION GAP SERPL CALC-SCNC: 14 MMOL/L — SIGNIFICANT CHANGE UP (ref 5–17)
BASOPHILS # BLD AUTO: 0.02 K/UL — SIGNIFICANT CHANGE UP (ref 0–0.2)
BASOPHILS NFR BLD AUTO: 0.2 % — SIGNIFICANT CHANGE UP (ref 0–2)
BUN SERPL-MCNC: 9 MG/DL — SIGNIFICANT CHANGE UP (ref 7–23)
CALCIUM SERPL-MCNC: 7.7 MG/DL — LOW (ref 8.4–10.5)
CHLORIDE SERPL-SCNC: 107 MMOL/L — SIGNIFICANT CHANGE UP (ref 96–108)
CO2 SERPL-SCNC: 19 MMOL/L — LOW (ref 22–31)
CREAT SERPL-MCNC: 0.67 MG/DL — SIGNIFICANT CHANGE UP (ref 0.5–1.3)
EGFR: 99 ML/MIN/1.73M2 — SIGNIFICANT CHANGE UP
EOSINOPHIL # BLD AUTO: 0 K/UL — SIGNIFICANT CHANGE UP (ref 0–0.5)
EOSINOPHIL NFR BLD AUTO: 0 % — SIGNIFICANT CHANGE UP (ref 0–6)
GLUCOSE SERPL-MCNC: 168 MG/DL — HIGH (ref 70–99)
HCT VFR BLD CALC: 39.3 % — SIGNIFICANT CHANGE UP (ref 39–50)
HGB BLD-MCNC: 12.3 G/DL — LOW (ref 13–17)
IMM GRANULOCYTES NFR BLD AUTO: 1.3 % — SIGNIFICANT CHANGE UP (ref 0–1.5)
LYMPHOCYTES # BLD AUTO: 0.98 K/UL — LOW (ref 1–3.3)
LYMPHOCYTES # BLD AUTO: 9.2 % — LOW (ref 13–44)
MCHC RBC-ENTMCNC: 27.2 PG — SIGNIFICANT CHANGE UP (ref 27–34)
MCHC RBC-ENTMCNC: 31.3 GM/DL — LOW (ref 32–36)
MCV RBC AUTO: 86.9 FL — SIGNIFICANT CHANGE UP (ref 80–100)
MONOCYTES # BLD AUTO: 0.48 K/UL — SIGNIFICANT CHANGE UP (ref 0–0.9)
MONOCYTES NFR BLD AUTO: 4.5 % — SIGNIFICANT CHANGE UP (ref 2–14)
NEUTROPHILS # BLD AUTO: 9.03 K/UL — HIGH (ref 1.8–7.4)
NEUTROPHILS NFR BLD AUTO: 84.8 % — HIGH (ref 43–77)
NRBC # BLD: 0 /100 WBCS — SIGNIFICANT CHANGE UP (ref 0–0)
PLATELET # BLD AUTO: 198 K/UL — SIGNIFICANT CHANGE UP (ref 150–400)
POTASSIUM SERPL-MCNC: 4.1 MMOL/L — SIGNIFICANT CHANGE UP (ref 3.5–5.3)
POTASSIUM SERPL-SCNC: 4.1 MMOL/L — SIGNIFICANT CHANGE UP (ref 3.5–5.3)
RBC # BLD: 4.52 M/UL — SIGNIFICANT CHANGE UP (ref 4.2–5.8)
RBC # FLD: 13.9 % — SIGNIFICANT CHANGE UP (ref 10.3–14.5)
RH IG SCN BLD-IMP: POSITIVE — SIGNIFICANT CHANGE UP
SODIUM SERPL-SCNC: 140 MMOL/L — SIGNIFICANT CHANGE UP (ref 135–145)
WBC # BLD: 10.65 K/UL — HIGH (ref 3.8–10.5)
WBC # FLD AUTO: 10.65 K/UL — HIGH (ref 3.8–10.5)

## 2022-05-03 PROCEDURE — 63661 REMOVE SPINE ELTRD PERQ ARAY: CPT

## 2022-05-03 PROCEDURE — 22614 ARTHRD PST TQ 1NTRSPC EA ADD: CPT

## 2022-05-03 PROCEDURE — 22842 INSERT SPINE FIXATION DEVICE: CPT

## 2022-05-03 PROCEDURE — 61783 SCAN PROC SPINAL: CPT | Mod: 59

## 2022-05-03 PROCEDURE — 22853 INSJ BIOMECHANICAL DEVICE: CPT

## 2022-05-03 PROCEDURE — 22214 INCIS 1 VERTEBRAL SEG LUMBAR: CPT

## 2022-05-03 PROCEDURE — 63047 LAM FACETEC & FORAMOT LUMBAR: CPT | Mod: 59

## 2022-05-03 PROCEDURE — 63688 REV/RMV IMP SP NPG/R DTCH CN: CPT

## 2022-05-03 PROCEDURE — 22612 ARTHRD PST TQ 1NTRSPC LUMBAR: CPT

## 2022-05-03 PROCEDURE — 22558 ARTHRD ANT NTRBD MIN DSC LUM: CPT

## 2022-05-03 DEVICE — GRAFT BONE INFUSE KIT SM: Type: IMPLANTABLE DEVICE | Site: BACK | Status: FUNCTIONAL

## 2022-05-03 DEVICE — SCREW MAS 6.5X45MM: Type: IMPLANTABLE DEVICE | Site: BACK | Status: FUNCTIONAL

## 2022-05-03 DEVICE — IMPLANTABLE DEVICE: Type: IMPLANTABLE DEVICE | Site: BACK | Status: FUNCTIONAL

## 2022-05-03 DEVICE — SURGIFLO MATRIX WITH THROMBIN KIT: Type: IMPLANTABLE DEVICE | Site: BACK | Status: FUNCTIONAL

## 2022-05-03 DEVICE — SEALANT VISTASEAL FIBRIN HUMAN 4ML 4/PK: Type: IMPLANTABLE DEVICE | Site: BACK | Status: FUNCTIONAL

## 2022-05-03 DEVICE — SURGIFOAM PAD 8CM X 12.5CM X 10MM (100): Type: IMPLANTABLE DEVICE | Site: BACK | Status: FUNCTIONAL

## 2022-05-03 DEVICE — VITOSS BA FOAM PACK 2.5CC: Type: IMPLANTABLE DEVICE | Site: BACK | Status: FUNCTIONAL

## 2022-05-03 DEVICE — ROD TI CURVE CP4 5.5X100MM: Type: IMPLANTABLE DEVICE | Site: BACK | Status: FUNCTIONAL

## 2022-05-03 DEVICE — SCREW SOLERA 7.5X45X5.5: Type: IMPLANTABLE DEVICE | Site: BACK | Status: FUNCTIONAL

## 2022-05-03 DEVICE — SCREW SET: Type: IMPLANTABLE DEVICE | Site: BACK | Status: FUNCTIONAL

## 2022-05-03 RX ORDER — BUPROPION HYDROCHLORIDE 150 MG/1
150 TABLET, EXTENDED RELEASE ORAL DAILY
Refills: 0 | Status: DISCONTINUED | OUTPATIENT
Start: 2022-05-03 | End: 2022-05-10

## 2022-05-03 RX ORDER — ROSUVASTATIN CALCIUM 5 MG/1
1 TABLET ORAL
Qty: 0 | Refills: 0 | DISCHARGE

## 2022-05-03 RX ORDER — SENNA PLUS 8.6 MG/1
2 TABLET ORAL AT BEDTIME
Refills: 0 | Status: DISCONTINUED | OUTPATIENT
Start: 2022-05-03 | End: 2022-05-10

## 2022-05-03 RX ORDER — QUETIAPINE FUMARATE 200 MG/1
1 TABLET, FILM COATED ORAL
Qty: 0 | Refills: 0 | DISCHARGE

## 2022-05-03 RX ORDER — CEFAZOLIN SODIUM 1 G
2000 VIAL (EA) INJECTION EVERY 8 HOURS
Refills: 0 | Status: COMPLETED | OUTPATIENT
Start: 2022-05-03 | End: 2022-05-04

## 2022-05-03 RX ORDER — BACILLUS COAGULANS/INULIN 1B-250 MG
1 CAPSULE ORAL
Qty: 0 | Refills: 0 | DISCHARGE

## 2022-05-03 RX ORDER — LIDOCAINE HCL 20 MG/ML
0.2 VIAL (ML) INJECTION ONCE
Refills: 0 | Status: DISCONTINUED | OUTPATIENT
Start: 2022-05-03 | End: 2022-05-03

## 2022-05-03 RX ORDER — ATORVASTATIN CALCIUM 80 MG/1
40 TABLET, FILM COATED ORAL AT BEDTIME
Refills: 0 | Status: DISCONTINUED | OUTPATIENT
Start: 2022-05-03 | End: 2022-05-10

## 2022-05-03 RX ORDER — TRAZODONE HCL 50 MG
100 TABLET ORAL AT BEDTIME
Refills: 0 | Status: DISCONTINUED | OUTPATIENT
Start: 2022-05-03 | End: 2022-05-10

## 2022-05-03 RX ORDER — GABAPENTIN 400 MG/1
900 CAPSULE ORAL
Qty: 0 | Refills: 0 | DISCHARGE

## 2022-05-03 RX ORDER — NALOXONE HYDROCHLORIDE 4 MG/.1ML
0.1 SPRAY NASAL
Refills: 0 | Status: DISCONTINUED | OUTPATIENT
Start: 2022-05-03 | End: 2022-05-04

## 2022-05-03 RX ORDER — LEVOTHYROXINE SODIUM 125 MCG
137 TABLET ORAL DAILY
Refills: 0 | Status: DISCONTINUED | OUTPATIENT
Start: 2022-05-03 | End: 2022-05-10

## 2022-05-03 RX ORDER — SODIUM CHLORIDE 9 MG/ML
1000 INJECTION, SOLUTION INTRAVENOUS
Refills: 0 | Status: DISCONTINUED | OUTPATIENT
Start: 2022-05-03 | End: 2022-05-04

## 2022-05-03 RX ORDER — ACETAMINOPHEN 500 MG
975 TABLET ORAL EVERY 8 HOURS
Refills: 0 | Status: DISCONTINUED | OUTPATIENT
Start: 2022-05-03 | End: 2022-05-10

## 2022-05-03 RX ORDER — CHLORHEXIDINE GLUCONATE 213 G/1000ML
1 SOLUTION TOPICAL ONCE
Refills: 0 | Status: DISCONTINUED | OUTPATIENT
Start: 2022-05-03 | End: 2022-05-03

## 2022-05-03 RX ORDER — SODIUM CHLORIDE 9 MG/ML
3 INJECTION INTRAMUSCULAR; INTRAVENOUS; SUBCUTANEOUS EVERY 8 HOURS
Refills: 0 | Status: DISCONTINUED | OUTPATIENT
Start: 2022-05-03 | End: 2022-05-03

## 2022-05-03 RX ORDER — ALPRAZOLAM 0.25 MG
1 TABLET ORAL
Qty: 0 | Refills: 0 | DISCHARGE

## 2022-05-03 RX ORDER — OXYCODONE HYDROCHLORIDE 5 MG/1
10 TABLET ORAL EVERY 4 HOURS
Refills: 0 | Status: DISCONTINUED | OUTPATIENT
Start: 2022-05-03 | End: 2022-05-10

## 2022-05-03 RX ORDER — FENTANYL CITRATE 50 UG/ML
50 INJECTION INTRAVENOUS
Refills: 0 | Status: DISCONTINUED | OUTPATIENT
Start: 2022-05-03 | End: 2022-05-03

## 2022-05-03 RX ORDER — HYDROMORPHONE HYDROCHLORIDE 2 MG/ML
30 INJECTION INTRAMUSCULAR; INTRAVENOUS; SUBCUTANEOUS
Refills: 0 | Status: DISCONTINUED | OUTPATIENT
Start: 2022-05-03 | End: 2022-05-04

## 2022-05-03 RX ORDER — BUPROPION HYDROCHLORIDE 150 MG/1
1 TABLET, EXTENDED RELEASE ORAL
Qty: 0 | Refills: 0 | DISCHARGE

## 2022-05-03 RX ORDER — ONDANSETRON 8 MG/1
4 TABLET, FILM COATED ORAL EVERY 6 HOURS
Refills: 0 | Status: DISCONTINUED | OUTPATIENT
Start: 2022-05-03 | End: 2022-05-04

## 2022-05-03 RX ORDER — DEXAMETHASONE 0.5 MG/5ML
5 ELIXIR ORAL EVERY 6 HOURS
Refills: 0 | Status: COMPLETED | OUTPATIENT
Start: 2022-05-03 | End: 2022-05-04

## 2022-05-03 RX ORDER — LEVOTHYROXINE SODIUM 125 MCG
1 TABLET ORAL
Qty: 0 | Refills: 0 | DISCHARGE

## 2022-05-03 RX ORDER — OXYCODONE HYDROCHLORIDE 5 MG/1
5 TABLET ORAL EVERY 4 HOURS
Refills: 0 | Status: DISCONTINUED | OUTPATIENT
Start: 2022-05-03 | End: 2022-05-10

## 2022-05-03 RX ORDER — DEXAMETHASONE 0.5 MG/5ML
3 ELIXIR ORAL EVERY 6 HOURS
Refills: 0 | Status: COMPLETED | OUTPATIENT
Start: 2022-05-04 | End: 2022-05-05

## 2022-05-03 RX ORDER — TRAMADOL HYDROCHLORIDE 50 MG/1
50 TABLET ORAL EVERY 6 HOURS
Refills: 0 | Status: DISCONTINUED | OUTPATIENT
Start: 2022-05-03 | End: 2022-05-10

## 2022-05-03 RX ORDER — QUETIAPINE FUMARATE 200 MG/1
100 TABLET, FILM COATED ORAL AT BEDTIME
Refills: 0 | Status: DISCONTINUED | OUTPATIENT
Start: 2022-05-03 | End: 2022-05-10

## 2022-05-03 RX ORDER — CYCLOBENZAPRINE HYDROCHLORIDE 10 MG/1
5 TABLET, FILM COATED ORAL EVERY 8 HOURS
Refills: 0 | Status: DISCONTINUED | OUTPATIENT
Start: 2022-05-03 | End: 2022-05-10

## 2022-05-03 RX ORDER — ONDANSETRON 8 MG/1
4 TABLET, FILM COATED ORAL EVERY 6 HOURS
Refills: 0 | Status: DISCONTINUED | OUTPATIENT
Start: 2022-05-03 | End: 2022-05-10

## 2022-05-03 RX ORDER — ALPRAZOLAM 0.25 MG
1 TABLET ORAL
Refills: 0 | Status: DISCONTINUED | OUTPATIENT
Start: 2022-05-03 | End: 2022-05-10

## 2022-05-03 RX ORDER — GABAPENTIN 400 MG/1
900 CAPSULE ORAL THREE TIMES A DAY
Refills: 0 | Status: DISCONTINUED | OUTPATIENT
Start: 2022-05-03 | End: 2022-05-04

## 2022-05-03 RX ORDER — MAGNESIUM HYDROXIDE 400 MG/1
30 TABLET, CHEWABLE ORAL EVERY 12 HOURS
Refills: 0 | Status: DISCONTINUED | OUTPATIENT
Start: 2022-05-03 | End: 2022-05-10

## 2022-05-03 RX ORDER — VALACYCLOVIR 500 MG/1
1 TABLET, FILM COATED ORAL
Qty: 0 | Refills: 0 | DISCHARGE

## 2022-05-03 RX ORDER — VALACYCLOVIR 500 MG/1
500 TABLET, FILM COATED ORAL DAILY
Refills: 0 | Status: DISCONTINUED | OUTPATIENT
Start: 2022-05-03 | End: 2022-05-10

## 2022-05-03 RX ORDER — DEXAMETHASONE 0.5 MG/5ML
1 ELIXIR ORAL EVERY 6 HOURS
Refills: 0 | Status: DISCONTINUED | OUTPATIENT
Start: 2022-05-03 | End: 2022-05-03

## 2022-05-03 RX ORDER — DIPHENHYDRAMINE HCL 50 MG
25 CAPSULE ORAL EVERY 6 HOURS
Refills: 0 | Status: DISCONTINUED | OUTPATIENT
Start: 2022-05-03 | End: 2022-05-10

## 2022-05-03 RX ORDER — HYDROMORPHONE HYDROCHLORIDE 2 MG/ML
0.5 INJECTION INTRAMUSCULAR; INTRAVENOUS; SUBCUTANEOUS
Refills: 0 | Status: DISCONTINUED | OUTPATIENT
Start: 2022-05-03 | End: 2022-05-03

## 2022-05-03 RX ORDER — ONDANSETRON 8 MG/1
4 TABLET, FILM COATED ORAL ONCE
Refills: 0 | Status: DISCONTINUED | OUTPATIENT
Start: 2022-05-03 | End: 2022-05-03

## 2022-05-03 RX ORDER — TRAZODONE HCL 50 MG
1 TABLET ORAL
Qty: 0 | Refills: 0 | DISCHARGE

## 2022-05-03 RX ORDER — DEXAMETHASONE 0.5 MG/5ML
1 ELIXIR ORAL EVERY 6 HOURS
Refills: 0 | Status: COMPLETED | OUTPATIENT
Start: 2022-05-05 | End: 2022-05-06

## 2022-05-03 RX ADMIN — GABAPENTIN 900 MILLIGRAM(S): 400 CAPSULE ORAL at 22:30

## 2022-05-03 RX ADMIN — QUETIAPINE FUMARATE 100 MILLIGRAM(S): 200 TABLET, FILM COATED ORAL at 23:42

## 2022-05-03 RX ADMIN — HYDROMORPHONE HYDROCHLORIDE 30 MILLILITER(S): 2 INJECTION INTRAMUSCULAR; INTRAVENOUS; SUBCUTANEOUS at 23:29

## 2022-05-03 RX ADMIN — Medication 975 MILLIGRAM(S): at 22:30

## 2022-05-03 RX ADMIN — HYDROMORPHONE HYDROCHLORIDE 0.5 MILLIGRAM(S): 2 INJECTION INTRAMUSCULAR; INTRAVENOUS; SUBCUTANEOUS at 20:54

## 2022-05-03 RX ADMIN — Medication 100 MILLIGRAM(S): at 23:42

## 2022-05-03 RX ADMIN — Medication 1 MILLIGRAM(S): at 21:32

## 2022-05-03 RX ADMIN — HYDROMORPHONE HYDROCHLORIDE 30 MILLILITER(S): 2 INJECTION INTRAMUSCULAR; INTRAVENOUS; SUBCUTANEOUS at 21:03

## 2022-05-03 RX ADMIN — Medication 25 MILLIGRAM(S): at 23:39

## 2022-05-03 RX ADMIN — SODIUM CHLORIDE 75 MILLILITER(S): 9 INJECTION, SOLUTION INTRAVENOUS at 21:15

## 2022-05-03 RX ADMIN — HYDROMORPHONE HYDROCHLORIDE 0.5 MILLIGRAM(S): 2 INJECTION INTRAMUSCULAR; INTRAVENOUS; SUBCUTANEOUS at 21:21

## 2022-05-03 RX ADMIN — Medication 100 MILLIGRAM(S): at 22:29

## 2022-05-03 RX ADMIN — Medication 5 MILLIGRAM(S): at 21:25

## 2022-05-03 NOTE — PATIENT PROFILE ADULT - FALL HARM RISK - UNIVERSAL INTERVENTIONS
Bed in lowest position, wheels locked, appropriate side rails in place/Call bell, personal items and telephone in reach/Instruct patient to call for assistance before getting out of bed or chair/Non-slip footwear when patient is out of bed/[place] to call system/Physically safe environment - no spills, clutter or unnecessary equipment/Purposeful Proactive Rounding/Room/bathroom lighting operational, light cord in reach

## 2022-05-03 NOTE — PRE-ANESTHESIA EVALUATION ADULT - NSANTHBMIRD_ENT_A_CORE
Per Pharmacist Ruma pt requests tier exception COLIN because copay $400   Please advise rationale.      We've requested pharmacy supply us with insurance info    Prior Authorization Retail Medication Request    Medication/Dose:     ADDERALL 20 MG per tablet 60 tablet 0 7/20/2018  Yes   Sig - Route: Take 1 tablet (20 mg) by mouth 2 times daily Dispense name brand. Dispense 30 day supply, quantity of 60 tablets. - Oral       ICD code (if different than what is on RX):   Previously Tried and Failed:    Rationale:     Insurance Name: [employer], ID: [id number], help desk phone # [phone]   Insurance ID:    Gino Hicks RN     No

## 2022-05-03 NOTE — CHART NOTE - NSCHARTNOTEFT_GEN_A_CORE
Patient seen in RR, resting without complaints.  No Chest Pain, SOB, N/V.  Pre op s/sx: Low back pain with bilateral lower extremity radiculopathy and left leg parasthesias/weakness; Post op, patient reports: improved low back pain and leg pain.    T(C): 36.2 (05-03-22 @ 20:38), Max: 36.4 (05-03-22 @ 09:00)  HR: 74 (05-03-22 @ 22:00) (66 - 78)  BP: 142/77 (05-03-22 @ 22:00) (142/76 - 158/91)  RR: 16 (05-03-22 @ 21:30) (14 - 18)  SpO2: 95% (05-03-22 @ 22:00) (95% - 100%)  Wt(kg): --    Exam:   Alert/Oriented, No Acute Distress  Cards: +S1/S2, RRR  Pulm: CTAB           Dressing: [x] clean/dry/intact  [ ] Other:           Drains: : HV x 1         Sensation: [ ] intact to light touch  [x] decreased: to LLE         Motor exam: [  ]          [ ] Upper extremity                     Bi          Tri        Delt                                                    R         5/5        5/5        5/5                                               L          5/5        5/5        5/5                  [ ] Lower extremity                     PF          DF         EHL       FHL     Knee Flexion        Knee Extension                                                                                        R        5/5        5/5        5/5       5/5            5/5                       5/5                                               L         5/5        5/5        5/5       5/5           4/5                        4/5                                                         Calves Soft/Non-tender bilaterally           Toes warm well perfused; capillary refill <3 seconds          Peraza in place; draining clear, yellow urine             LABS:                        12.3   10.65 )-----------( 198      ( 03 May 2022 21:01 )             39.3     05-03    140  |  107  |  9   ----------------------------<  168<H>  4.1   |  19<L>  |  0.67    Ca    7.7<L>      03 May 2022 21:01          A/P : Patient is a 71y Male s/p L2/3 prone lateral interbody fusion, L S1 screw, R L5 and S1 screws revised. Rods placed for L2-S1 fusion construct.  -    Pain control - PCA  -    Taper  -    DVT ppx: SCDs       -    Physical Therapy  -    Weight bearing status: WBAT [x]        PWB    [ ]     TTWB  [ ]      NWB  [ ]  -    FU AM Labs  -    Dispo planning pending PT recs      Floridalma Ross PA-C  Team Pager #[phone] Patient seen in RR, resting without complaints.  No Chest Pain, SOB, N/V.  Pre op s/sx: Low back pain with bilateral lower extremity radiculopathy and left leg parasthesias/weakness; Post op, patient reports: improved low back pain and leg pain.    T(C): 36.2 (05-03-22 @ 20:38), Max: 36.4 (05-03-22 @ 09:00)  HR: 74 (05-03-22 @ 22:00) (66 - 78)  BP: 142/77 (05-03-22 @ 22:00) (142/76 - 158/91)  RR: 16 (05-03-22 @ 21:30) (14 - 18)  SpO2: 95% (05-03-22 @ 22:00) (95% - 100%)  Wt(kg): --    Exam:   Alert/Oriented, No Acute Distress  Cards: +S1/S2, RRR  Pulm: CTAB           Dressing: [x] clean/dry/intact  [ ] Other:           Drains: : HV x 1; REZA x 1; maintaining good suction         Sensation: [ ] intact to light touch  [x] decreased: to LLE         Motor exam: [  ]          [ ] Upper extremity                     Bi          Tri        Delt                                                    R         5/5        5/5        5/5                                               L          5/5        5/5        5/5                  [ ] Lower extremity                     PF          DF         EHL       FHL     Knee Flexion        Knee Extension                                                                                        R        5/5        5/5        5/5       5/5            5/5                       5/5                                               L         5/5        5/5        5/5       5/5           4/5                        4/5                                                         Calves Soft/Non-tender bilaterally           Toes warm well perfused; capillary refill <3 seconds          Peraza in place; draining clear, yellow urine             LABS:                        12.3   10.65 )-----------( 198      ( 03 May 2022 21:01 )             39.3     05-03    140  |  107  |  9   ----------------------------<  168<H>  4.1   |  19<L>  |  0.67    Ca    7.7<L>      03 May 2022 21:01          A/P : Patient is a 71y Male s/p L2/3 prone lateral interbody fusion, L S1 screw, R L5 and S1 screws revised. Rods placed for L2-S1 fusion construct.  -    Pain control - PCA  -    Taper  -    DVT ppx: SCDs       -    Physical Therapy  -    Weight bearing status: WBAT [x]        PWB    [ ]     TTWB  [ ]      NWB  [ ]  -    FU AM Labs  -    Dispo planning pending PT recs      Floridalma Ross PA-C  Team Pager #[phone]

## 2022-05-03 NOTE — PRE-ANESTHESIA EVALUATION ADULT - NSANTHPMHFT_GEN_ALL_CORE
Cardiac clearance in chart - no contraindications to procedure. LBBB at baseline.  Nuclear stress test normal with EF 59%

## 2022-05-03 NOTE — CHART NOTE - NSCHARTNOTEFT_GEN_A_CORE
CAPRINI SCORE [CLOT]    AGE RELATED RISK FACTORS                                                       MOBILITY RELATED FACTORS  [ ] Age 41-60 years                                            (1 Point)                  [ ] Bed rest                                                        (1 Point)  [x] Age: 61-74 years                                           (2 Points)                 [ ] Plaster cast                                                   (2 Points)  [ ] Age= 75 years                                              (3 Points)                 [ ] Bed bound for more than 72 hours                 (2 Points)    DISEASE RELATED RISK FACTORS                                               GENDER SPECIFIC FACTORS  [ ] Edema in the lower extremities                       (1 Point)                  [ ] Pregnancy                                                     (1 Point)  [ ] Varicose veins                                               (1 Point)                  [ ] Post-partum < 6 weeks                                   (1 Point)             [x] BMI > 25 Kg/m2                                            (1 Point)                  [ ] Hormonal therapy  or oral contraception          (1 Point)                 [ ] Sepsis (in the previous month)                        (1 Point)                  [ ] History of pregnancy complications                 (1 point)  [ ] Pneumonia or serious lung disease                                               [ ] Unexplained or recurrent                     (1 Point)           (in the previous month)                               (1 Point)  [ ] Abnormal pulmonary function test                     (1 Point)                 SURGERY RELATED RISK FACTORS  [ ] Acute myocardial infarction                              (1 Point)                 [ ]  Section                                             (1 Point)  [ ] Congestive heart failure (in the previous month)  (1 Point)               [ ] Minor surgery                                                  (1 Point)   [ ] Inflammatory bowel disease                             (1 Point)                 [ ] Arthroscopic surgery                                        (2 Points)  [ ] Central venous access                                      (2 Points)                [x] General surgery lasting more than 45 minutes   (2 Points)       [ ] Stroke (in the previous month)                          (5 Points)               [ ] Elective arthroplasty                                         (5 Points)                                                                                                                                               HEMATOLOGY RELATED FACTORS                                                 TRAUMA RELATED RISK FACTORS  [ ] Prior episodes of VTE                                     (3 Points)                [ ] Fracture of the hip, pelvis, or leg                       (5 Points)  [ ] Positive family history for VTE                         (3 Points)                 [ ] Acute spinal cord injury (in the previous month)  (5 Points)  [ ] Prothrombin 47551 A                                     (3 Points)                 [ ] Paralysis  (less than 1 month)                             (5 Points)  [ ] Factor V Leiden                                             (3 Points)                  [ ] Multiple Trauma within 1 month                        (5 Points)  [ ] Lupus anticoagulants                                     (3 Points)                                                           [ ] Anticardiolipin antibodies                               (3 Points)                                                       [ ] High homocysteine in the blood                      (3 Points)                                             [ ] Other congenital or acquired thrombophilia      (3 Points)                                                [ ] Heparin induced thrombocytopenia                  (3 Points)                                          Total Score [5]    Caprini Score 0 - 2:  Low Risk, No VTE Prophylaxis required for most patients, encourage ambulation  Caprini Score 3 - 6:  At Risk, pharmacologic VTE prophylaxis is indicated for most patients (in the absence of a contraindication)  Caprini Score Greater than or = 7:  High Risk, pharmacologic VTE prophylaxis is indicated for most patients (in the absence of a contraindication)

## 2022-05-03 NOTE — BRIEF OPERATIVE NOTE - NSICDXBRIEFPREOP_GEN_ALL_CORE_FT
PRE-OP DIAGNOSIS:  Spondylolisthesis at L2-L3 level 03-May-2022 19:51:52  Benji Gomez  
PRE-OP DIAGNOSIS:  Back pain 03-May-2022 14:47:53  Damien Rucker  
PRE-OP DIAGNOSIS:  Back pain 03-May-2022 14:47:53  Damien Rucker

## 2022-05-03 NOTE — BRIEF OPERATIVE NOTE - NSICDXBRIEFPROCEDURE_GEN_ALL_CORE_FT
PROCEDURES:  Removal of spinal cord stimulator generator 03-May-2022 14:47:45  Damien Rucker  
PROCEDURES:  Direct lateral interbody fusion (DLIF) with removal of hardware 03-May-2022 19:51:27  Benji Gomez  Lumbar laminectomy with foraminotomy 03-May-2022 19:51:39  Benji Gomez  
PROCEDURES:  Muscle flap, trunk 03-May-2022 20:42:46  Arik Warren

## 2022-05-03 NOTE — BRIEF OPERATIVE NOTE - NSICDXBRIEFPOSTOP_GEN_ALL_CORE_FT
POST-OP DIAGNOSIS:  Back pain 03-May-2022 14:48:04  Damien Rucker  
POST-OP DIAGNOSIS:  Back pain 03-May-2022 14:48:04  Damien Rucker  
POST-OP DIAGNOSIS:  Spondylolisthesis at L2-L3 level 03-May-2022 19:52:00  Benji Gomez

## 2022-05-03 NOTE — BRIEF OPERATIVE NOTE - OPERATION/FINDINGS
Removal SCS generator (R buttock) and leads (x2)  Ortho performing lumbosacral instrumentation and fusion post-removal
B/l paraspinal muscle flaps and complex closure, closure of side incisions, left HMV is deep, REZA on right in superficial plane
Neurosurgery performed removal of spinal cord stimulator at beginning of surgery. Prior spinal fusion rods and crosslink removed. Fusion construct extended one level with L2 screws placed using mazor robot. L2/3 prone lateral interbody device place using mazor robot. L S1 screw, R L5 and S1 screws revised. Rods placed for L2-S1 fusion construct. Closure by plastic surgery

## 2022-05-04 ENCOUNTER — RESULT REVIEW (OUTPATIENT)
Age: 72
End: 2022-05-04

## 2022-05-04 LAB
ANION GAP SERPL CALC-SCNC: 13 MMOL/L — SIGNIFICANT CHANGE UP (ref 5–17)
BASOPHILS # BLD AUTO: 0.01 K/UL — SIGNIFICANT CHANGE UP (ref 0–0.2)
BASOPHILS NFR BLD AUTO: 0.1 % — SIGNIFICANT CHANGE UP (ref 0–2)
BUN SERPL-MCNC: 11 MG/DL — SIGNIFICANT CHANGE UP (ref 7–23)
CALCIUM SERPL-MCNC: 8.1 MG/DL — LOW (ref 8.4–10.5)
CHLORIDE SERPL-SCNC: 105 MMOL/L — SIGNIFICANT CHANGE UP (ref 96–108)
CO2 SERPL-SCNC: 22 MMOL/L — SIGNIFICANT CHANGE UP (ref 22–31)
CREAT SERPL-MCNC: 0.68 MG/DL — SIGNIFICANT CHANGE UP (ref 0.5–1.3)
EGFR: 99 ML/MIN/1.73M2 — SIGNIFICANT CHANGE UP
EOSINOPHIL # BLD AUTO: 0 K/UL — SIGNIFICANT CHANGE UP (ref 0–0.5)
EOSINOPHIL NFR BLD AUTO: 0 % — SIGNIFICANT CHANGE UP (ref 0–6)
GLUCOSE SERPL-MCNC: 178 MG/DL — HIGH (ref 70–99)
HCT VFR BLD CALC: 37 % — LOW (ref 39–50)
HGB BLD-MCNC: 11.5 G/DL — LOW (ref 13–17)
IMM GRANULOCYTES NFR BLD AUTO: 0.9 % — SIGNIFICANT CHANGE UP (ref 0–1.5)
LYMPHOCYTES # BLD AUTO: 0.86 K/UL — LOW (ref 1–3.3)
LYMPHOCYTES # BLD AUTO: 7.6 % — LOW (ref 13–44)
MCHC RBC-ENTMCNC: 26.9 PG — LOW (ref 27–34)
MCHC RBC-ENTMCNC: 31.1 GM/DL — LOW (ref 32–36)
MCV RBC AUTO: 86.7 FL — SIGNIFICANT CHANGE UP (ref 80–100)
MONOCYTES # BLD AUTO: 0.74 K/UL — SIGNIFICANT CHANGE UP (ref 0–0.9)
MONOCYTES NFR BLD AUTO: 6.5 % — SIGNIFICANT CHANGE UP (ref 2–14)
NEUTROPHILS # BLD AUTO: 9.59 K/UL — HIGH (ref 1.8–7.4)
NEUTROPHILS NFR BLD AUTO: 84.9 % — HIGH (ref 43–77)
NRBC # BLD: 0 /100 WBCS — SIGNIFICANT CHANGE UP (ref 0–0)
PLATELET # BLD AUTO: 192 K/UL — SIGNIFICANT CHANGE UP (ref 150–400)
POTASSIUM SERPL-MCNC: 3.9 MMOL/L — SIGNIFICANT CHANGE UP (ref 3.5–5.3)
POTASSIUM SERPL-SCNC: 3.9 MMOL/L — SIGNIFICANT CHANGE UP (ref 3.5–5.3)
RBC # BLD: 4.27 M/UL — SIGNIFICANT CHANGE UP (ref 4.2–5.8)
RBC # FLD: 13.9 % — SIGNIFICANT CHANGE UP (ref 10.3–14.5)
SODIUM SERPL-SCNC: 140 MMOL/L — SIGNIFICANT CHANGE UP (ref 135–145)
WBC # BLD: 11.3 K/UL — HIGH (ref 3.8–10.5)
WBC # FLD AUTO: 11.3 K/UL — HIGH (ref 3.8–10.5)

## 2022-05-04 RX ORDER — ACETAMINOPHEN 500 MG
1000 TABLET ORAL ONCE
Refills: 0 | Status: DISCONTINUED | OUTPATIENT
Start: 2022-05-04 | End: 2022-05-10

## 2022-05-04 RX ORDER — FAMOTIDINE 10 MG/ML
20 INJECTION INTRAVENOUS ONCE
Refills: 0 | Status: COMPLETED | OUTPATIENT
Start: 2022-05-04 | End: 2022-05-04

## 2022-05-04 RX ORDER — FAMOTIDINE 10 MG/ML
20 INJECTION INTRAVENOUS
Refills: 0 | Status: DISCONTINUED | OUTPATIENT
Start: 2022-05-05 | End: 2022-05-10

## 2022-05-04 RX ORDER — HYDROMORPHONE HYDROCHLORIDE 2 MG/ML
0.5 INJECTION INTRAMUSCULAR; INTRAVENOUS; SUBCUTANEOUS EVERY 6 HOURS
Refills: 0 | Status: DISCONTINUED | OUTPATIENT
Start: 2022-05-04 | End: 2022-05-10

## 2022-05-04 RX ORDER — GABAPENTIN 400 MG/1
800 CAPSULE ORAL THREE TIMES A DAY
Refills: 0 | Status: DISCONTINUED | OUTPATIENT
Start: 2022-05-04 | End: 2022-05-10

## 2022-05-04 RX ORDER — GABAPENTIN 400 MG/1
100 CAPSULE ORAL THREE TIMES A DAY
Refills: 0 | Status: DISCONTINUED | OUTPATIENT
Start: 2022-05-04 | End: 2022-05-10

## 2022-05-04 RX ORDER — GABAPENTIN 400 MG/1
100 CAPSULE ORAL ONCE
Refills: 0 | Status: COMPLETED | OUTPATIENT
Start: 2022-05-04 | End: 2022-05-04

## 2022-05-04 RX ORDER — GABAPENTIN 400 MG/1
800 CAPSULE ORAL ONCE
Refills: 0 | Status: COMPLETED | OUTPATIENT
Start: 2022-05-04 | End: 2022-05-04

## 2022-05-04 RX ADMIN — Medication 975 MILLIGRAM(S): at 22:50

## 2022-05-04 RX ADMIN — FAMOTIDINE 20 MILLIGRAM(S): 10 INJECTION INTRAVENOUS at 20:02

## 2022-05-04 RX ADMIN — Medication 25 MILLIGRAM(S): at 19:36

## 2022-05-04 RX ADMIN — Medication 1 MILLIGRAM(S): at 21:49

## 2022-05-04 RX ADMIN — Medication 1 MILLIGRAM(S): at 05:36

## 2022-05-04 RX ADMIN — Medication 25 MILLIGRAM(S): at 05:44

## 2022-05-04 RX ADMIN — Medication 100 MILLIGRAM(S): at 05:27

## 2022-05-04 RX ADMIN — CYCLOBENZAPRINE HYDROCHLORIDE 5 MILLIGRAM(S): 10 TABLET, FILM COATED ORAL at 12:28

## 2022-05-04 RX ADMIN — QUETIAPINE FUMARATE 100 MILLIGRAM(S): 200 TABLET, FILM COATED ORAL at 21:50

## 2022-05-04 RX ADMIN — GABAPENTIN 100 MILLIGRAM(S): 400 CAPSULE ORAL at 21:50

## 2022-05-04 RX ADMIN — VALACYCLOVIR 500 MILLIGRAM(S): 500 TABLET, FILM COATED ORAL at 11:46

## 2022-05-04 RX ADMIN — BUPROPION HYDROCHLORIDE 150 MILLIGRAM(S): 150 TABLET, EXTENDED RELEASE ORAL at 11:46

## 2022-05-04 RX ADMIN — Medication 5 MILLIGRAM(S): at 02:39

## 2022-05-04 RX ADMIN — GABAPENTIN 800 MILLIGRAM(S): 400 CAPSULE ORAL at 21:50

## 2022-05-04 RX ADMIN — Medication 975 MILLIGRAM(S): at 16:00

## 2022-05-04 RX ADMIN — Medication 137 MICROGRAM(S): at 05:27

## 2022-05-04 RX ADMIN — Medication 975 MILLIGRAM(S): at 15:08

## 2022-05-04 RX ADMIN — Medication 3 MILLIGRAM(S): at 20:05

## 2022-05-04 RX ADMIN — OXYCODONE HYDROCHLORIDE 10 MILLIGRAM(S): 5 TABLET ORAL at 15:55

## 2022-05-04 RX ADMIN — HYDROMORPHONE HYDROCHLORIDE 30 MILLILITER(S): 2 INJECTION INTRAMUSCULAR; INTRAVENOUS; SUBCUTANEOUS at 07:49

## 2022-05-04 RX ADMIN — Medication 975 MILLIGRAM(S): at 21:50

## 2022-05-04 RX ADMIN — ATORVASTATIN CALCIUM 40 MILLIGRAM(S): 80 TABLET, FILM COATED ORAL at 21:50

## 2022-05-04 RX ADMIN — Medication 100 MILLIGRAM(S): at 21:50

## 2022-05-04 RX ADMIN — OXYCODONE HYDROCHLORIDE 10 MILLIGRAM(S): 5 TABLET ORAL at 16:45

## 2022-05-04 RX ADMIN — GABAPENTIN 800 MILLIGRAM(S): 400 CAPSULE ORAL at 15:07

## 2022-05-04 RX ADMIN — GABAPENTIN 100 MILLIGRAM(S): 400 CAPSULE ORAL at 15:08

## 2022-05-04 RX ADMIN — SENNA PLUS 2 TABLET(S): 8.6 TABLET ORAL at 21:50

## 2022-05-04 RX ADMIN — ONDANSETRON 4 MILLIGRAM(S): 8 TABLET, FILM COATED ORAL at 01:23

## 2022-05-04 NOTE — PHYSICAL THERAPY INITIAL EVALUATION ADULT - DIAGNOSIS, PT EVAL
Pt presents with postop pain, impairments in strength, balance, endurance all impacting ability to perform ADLs and functional mobility

## 2022-05-04 NOTE — PROGRESS NOTE ADULT - SUBJECTIVE AND OBJECTIVE BOX
Orthopedics    POD 1 L2-3 Laminectomy, L3-S1 fusion  Pt Seen and Examined sitting in chair at bedside. Pain is controlled. Feeling well overall. No nausea or vomiting. Reports he is overall doing well but says that the steroids upset his stomach but understands importance of them in post operative course     Vital Signs Last 24 Hrs  T(C): 36.6 (05-04-22 @ 02:30), Max: 36.6 (05-04-22 @ 00:30)  T(F): 97.9 (05-04-22 @ 02:30), Max: 97.9 (05-04-22 @ 00:30)  HR: 75 (05-04-22 @ 02:30) (66 - 80)  BP: 133/68 (05-04-22 @ 02:30) (128/76 - 158/91)  BP(mean): 94 (05-03-22 @ 23:00) (94 - 113)  RR: 18 (05-04-22 @ 02:30) (14 - 18)  SpO2: 94% (05-04-22 @ 02:30) (93% - 100%)                        12.3   10.65 )-----------( 198      ( 03 May 2022 21:01 )             39.3     03 May 2022 21:01    140    |  107    |  9      ----------------------------<  168    4.1     |  19     |  0.67     Ca    7.7        03 May 2022 21:01    Exam:  NAD AAOx3  central aquacel Dressing clean and dry, left sided HMV in place with clean dry dressing, right side REZA in place with very minimal saturation on dressing  +EHL FHL TA GS  Left hip flexion 4/5 strength   SILT throughout but reports left lower extremity decreased sensation  +DP  Calf Soft NT    A/P:   71yMale  POD 1 L2-3 Laminectomy, L3-S1 fusion    -record drain output  -FU LSO Brace  -Pain control  -no chemical DVT/PE ppx  - WBAT/PT/OOB  -Med Mgmt  - FU Labs  - D/C Planning Orthopedics    POD 1 L2-3 Laminectomy, L3-S1 fusion  Pt Seen and Examined sitting in chair at bedside. Pain is controlled. Feeling well overall. No nausea or vomiting. Reports he is overall doing well but says that the steroids upset his stomach but understands importance of them in post operative course     Vital Signs Last 24 Hrs  T(C): 36.6 (05-04-22 @ 02:30), Max: 36.6 (05-04-22 @ 00:30)  T(F): 97.9 (05-04-22 @ 02:30), Max: 97.9 (05-04-22 @ 00:30)  HR: 75 (05-04-22 @ 02:30) (66 - 80)  BP: 133/68 (05-04-22 @ 02:30) (128/76 - 158/91)  BP(mean): 94 (05-03-22 @ 23:00) (94 - 113)  RR: 18 (05-04-22 @ 02:30) (14 - 18)  SpO2: 94% (05-04-22 @ 02:30) (93% - 100%)                        12.3   10.65 )-----------( 198      ( 03 May 2022 21:01 )             39.3     03 May 2022 21:01    140    |  107    |  9      ----------------------------<  168    4.1     |  19     |  0.67     Ca    7.7        03 May 2022 21:01    Exam:  NAD AAOx3  central aquacel Dressing clean and dry, left sided HMV in place with clean dry dressing, right side REZA in place with very minimal saturation on dressing  +EHL FHL TA GS  Left hip flexion and knee extension 4/5 strength   motor exam otherwise 5/5 throughout  SILT throughout but reports left lower extremity decreased sensation  +DP  Calf Soft NT    A/P:  Patient is a 71y Male s/p L2/3 prone lateral interbody fusion, L S1 screw, R L5 and S1 screws revised. Rods placed for L2-S1 fusion construct.    -record drain output  -FU LSO Brace  -PCA  -Steroid Taper  -Pain control  -no chemical DVT/PE ppx  - WBAT/PT/OOB  -Med Mgmt  - FU Labs  - D/C Planning Orthopedics    POD 1 Robotic and Navigation assisted Prone Lateral Interbody Fusion L23 with  L2-3 Laminectomy, and Posterior L2-S1 fusion  Pt Seen and Examined sitting in chair at bedside. Pain is controlled. Feeling well overall. No nausea or vomiting. Reports he is overall doing well and improved from prior to surgery.     Vital Signs Last 24 Hrs  T(C): 36.6 (05-04-22 @ 02:30), Max: 36.6 (05-04-22 @ 00:30)  T(F): 97.9 (05-04-22 @ 02:30), Max: 97.9 (05-04-22 @ 00:30)  HR: 75 (05-04-22 @ 02:30) (66 - 80)  BP: 133/68 (05-04-22 @ 02:30) (128/76 - 158/91)  BP(mean): 94 (05-03-22 @ 23:00) (94 - 113)  RR: 18 (05-04-22 @ 02:30) (14 - 18)  SpO2: 94% (05-04-22 @ 02:30) (93% - 100%)                        12.3   10.65 )-----------( 198      ( 03 May 2022 21:01 )             39.3     03 May 2022 21:01    140    |  107    |  9      ----------------------------<  168    4.1     |  19     |  0.67     Ca    7.7        03 May 2022 21:01    Exam:  NAD AAOx3  central aquacel Dressing clean and dry, left sided HMV in place with clean dry dressing, right side REZA in place with very minimal saturation on dressing  +EHL FHL TA GS  Left hip flexion and knee extension 4/5 strength   motor exam otherwise 5/5 throughout  SILT throughout but reports left lower extremity decreased sensation  +DP  Calf Soft NT    A/P:  Patient is a 71y Male s/p L2/3 prone lateral interbody fusion, L S1 screw, R L5 and S1 screws revised. Rods placed for L2-S1 fusion and posterolateral region construct.    -record drain output  -FU LSO Brace  -PCA  -Steroid Taper  -Pain control  -no chemical DVT/PE ppx  - WBAT/PT/OOB  -Med Mgmt  - FU Labs  - D/C Planning

## 2022-05-04 NOTE — PHYSICAL THERAPY INITIAL EVALUATION ADULT - ADDITIONAL COMMENTS
CT L-Spine 4/16/22: Patient status post posterior decompression and instrumented fusion of the L3-S1 levels. No evidence of acute hardware complication. Fusion of the L3-L5 posterior elements, as described above. When compared to prior CT, there has been interval worsening of junctional disease at L2-3 which now demonstrates moderate central stenosis with severe bilateral neural foraminal stenosis. At L5-S1 there is mild to moderate bilateral neural foraminal stenosis which is mildly increased in severity when compared to prior CT.

## 2022-05-04 NOTE — PROGRESS NOTE ADULT - SUBJECTIVE AND OBJECTIVE BOX
Subjective:   Patient seen at bedside this AM.     Objective:  Vital Signs  T(C): 36.5 (05-04 @ 06:54), Max: 36.6 (05-04 @ 00:30)  HR: 75 (05-04 @ 02:30) (66 - 80)  BP: 133/68 (05-04 @ 02:30) (128/76 - 158/91)  RR: 18 (05-04 @ 02:30) (14 - 18)  SpO2: 94% (05-04 @ 02:30) (93% - 100%)  05-03-22 @ 07:01  -  05-04-22 @ 07:00  --------------------------------------------------------  IN:  Total IN: 0 mL    OUT:    Accordian (mL): 145 mL    Bulb (mL): 120 mL    Indwelling Catheter - Urethral (mL): 715 mL  Total OUT: 980 mL    Total NET: -980 mL          Physical Exam:  GEN: resting in bed comfortably in NAD  RESP: no increased WOB  Back: aquacel in place minimal staining. hemovac and REZA with minimal serosang output      Labs:                        12.3   10.65 )-----------( 198      ( 03 May 2022 21:01 )             39.3   05-03    140  |  107  |  9   ----------------------------<  168<H>  4.1   |  19<L>  |  0.67    Ca    7.7<L>      03 May 2022 21:01      CAPILLARY BLOOD GLUCOSE          Imaging:

## 2022-05-04 NOTE — PHYSICAL THERAPY INITIAL EVALUATION ADULT - IMPAIRMENTS CONTRIBUTING TO GAIT DEVIATIONS, PT EVAL
09/07/18        Rosemarie Alvarado [address]      Dear Isa Rangel,    Our records indicate that you have outstanding lab work and or testing that was ordered for you and has not yet been completed:          Mammo Screening BILATERAL (W/CAD)
impaired balance/pain/decreased strength

## 2022-05-04 NOTE — PHYSICAL THERAPY INITIAL EVALUATION ADULT - PERTINENT HX OF CURRENT PROBLEM, REHAB EVAL
72 yo M with PMHx of Anxiety, depression, HLD, GERD, BPH, s/p thyroidectomy, s/p squamous cell carcinoma s/p excision on nose and forehead 2021, Intestinal obstruction s/p colectomy, s/p prostatectomy, s/p cervical spine fusion 2007, lumbar spine fusion 2008, spinal cord stimulator 2020 & revision 2021. Pt reports more than 10 yrs of mid to low back pain (w/ radiating pain, tingling and numbness to right LE) with multiple surgeries and spinal cord stimulator to help with pain, no relief noted.

## 2022-05-04 NOTE — PHYSICAL THERAPY INITIAL EVALUATION ADULT - IMPAIRED TRANSFERS: SIT/STAND, REHAB EVAL
March 23, 2017    Raya Kent  [address]  [place] [address]              [facility]  [address] [facility], [address]  5th Floor [facility]  [place] [address]  Phone: [phone]    To Whom It May Concern:    Ms. Kent is currently under our care for pregnancy.  Estimated Date of Delivery: 09-        Sincerely,          Michael A. Wiedemann, M.D.             impaired balance/pain/decreased strength

## 2022-05-04 NOTE — PROGRESS NOTE ADULT - SUBJECTIVE AND OBJECTIVE BOX
Day 1 of Anesthesia Pain Management Service    SUBJECTIVE: I'm doing ok    Pain Scale Score:	[X] Refer to charted pain scores    THERAPY:    [ ] IV PCA Morphine		[ ] 5 mg/mL	[ ] 1 mg/mL  [X] IV PCA Hydromorphone	[ ] 5 mg/mL	[X] 1 mg/mL  [ ] IV PCA Fentanyl		[ ] 50 micrograms/mL    Demand dose: 0.2 mg     Lockout: 6 minutes   Continuous Rate: 0 mg/hr  4 Hour Limit: 4 mg    MEDICATIONS  (STANDING):  acetaminophen     Tablet .. 975 milliGRAM(s) Oral every 8 hours  atorvastatin 40 milliGRAM(s) Oral at bedtime  buPROPion XL (24-Hour) . 150 milliGRAM(s) Oral daily  ceFAZolin   IVPB 2000 milliGRAM(s) IV Intermittent once  dexAMETHasone     Tablet 3 milliGRAM(s) Oral every 6 hours  dexAMETHasone  Injectable 5 milliGRAM(s) IV Push every 6 hours  gabapentin 900 milliGRAM(s) Oral three times a day  HYDROmorphone PCA (1 mG/mL) 30 milliLiter(s) PCA Continuous PCA Continuous  lactated ringers. 1000 milliLiter(s) (75 mL/Hr) IV Continuous <Continuous>  levothyroxine 137 MICROGram(s) Oral daily  QUEtiapine 100 milliGRAM(s) Oral at bedtime  senna 2 Tablet(s) Oral at bedtime  traZODone 100 milliGRAM(s) Oral at bedtime  valACYclovir 500 milliGRAM(s) Oral daily    MEDICATIONS  (PRN):  ALPRAZolam 1 milliGRAM(s) Oral two times a day PRN anxiety  cyclobenzaprine 5 milliGRAM(s) Oral every 8 hours PRN Muscle Spasm  diphenhydrAMINE 25 milliGRAM(s) Oral every 6 hours PRN Rash and/or Itching  magnesium hydroxide Suspension 30 milliLiter(s) Oral every 12 hours PRN Constipation  naloxone Injectable 0.1 milliGRAM(s) IV Push every 3 minutes PRN For ANY of the following changes in patient status:  A. RR LESS THAN 10 breaths per minute, B. Oxygen saturation LESS THAN 90%, C. Sedation score of 6  ondansetron Injectable 4 milliGRAM(s) IV Push every 6 hours PRN Nausea  ondansetron Injectable 4 milliGRAM(s) IV Push every 6 hours PRN Nausea  oxyCODONE    IR 10 milliGRAM(s) Oral every 4 hours PRN Severe Pain (7 - 10)  oxyCODONE    IR 5 milliGRAM(s) Oral every 4 hours PRN Moderate Pain (4 - 6)  traMADol 50 milliGRAM(s) Oral every 6 hours PRN Mild Pain (1 - 3)      OBJECTIVE:    Sedation Score:	[ X] Alert 	[ ] Drowsy 	[ ] Arousable	[ ] Asleep	[ ] Unresponsive    Side Effects:	[X ] None	[ ] Nausea	[ ] Vomiting	[ ] Pruritus  		[ ] Other:    Vital Signs Last 24 Hrs  T(C): 36.2 (04 May 2022 08:30), Max: 36.6 (04 May 2022 00:30)  T(F): 97.1 (04 May 2022 08:30), Max: 97.9 (04 May 2022 00:30)  HR: 78 (04 May 2022 08:30) (73 - 80)  BP: 135/76 (04 May 2022 08:30) (128/76 - 157/85)  BP(mean): 94 (03 May 2022 23:00) (94 - 113)  RR: 18 (04 May 2022 08:30) (14 - 18)  SpO2: 96% (04 May 2022 08:30) (93% - 100%)    ASSESSMENT/ PLAN    Therapy to  be:               [X] Continued   [ ] Discontinued   [ ] Changed to PRN Analgesics    Documentation and Verification of current medications:   [X] Done	[ ] Not done, not eligible    Comments: PCA use 0-1x/hr. Reeducated to use.

## 2022-05-04 NOTE — PHYSICAL THERAPY INITIAL EVALUATION ADULT - GENERAL OBSERVATIONS, REHAB EVAL
Pt received reclined in bedside chair in NAD, +IVF, +HMV, +REZA drain, +cont puls ox, +merino, VSS, agreeable to participate in therapy at this time Pt received reclined in bedside chair in NAD, +IVF, +HMV, +REZA drain, +PCA pump in progress, +cont puls ox, +merino, VSS, agreeable to participate in therapy at this time

## 2022-05-04 NOTE — OCCUPATIONAL THERAPY INITIAL EVALUATION ADULT - PERTINENT HX OF CURRENT PROBLEM, REHAB EVAL
71M pmhx lumbar spine fusion 2008, spinal cord stimulator 2020 & revision 2021. Pt reports more than 10 yrs of mid to low back pain (w/ radiating pain, tingling and numbness to right LE). Pt s/p L4-L5 Posterior Lumbar laminectomy, L2-L3 extension of fusion, L2-L3 level FRO L3-S1 and prone lateral lumbar interbody fusion L2-L3, closure by Plastic surgery with b/l paraspinal muscle flaps, and closure of side incisions 5/3.

## 2022-05-04 NOTE — PHYSICAL THERAPY INITIAL EVALUATION ADULT - MD ORDER
PT Evaluate & Treat  Activity-Out of Bed to Chair  LSO Brace per Ortho PT Evaluate & Treat  Activity-Out of Bed to Chair  LSO Brace for home per Ortho

## 2022-05-04 NOTE — PHYSICAL THERAPY INITIAL EVALUATION ADULT - LIVES WITH, PROFILE
Pt resides in apartment with spouse, 1 step to enter and 3 steps down to elevator. 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

## 2022-05-04 NOTE — PROGRESS NOTE ADULT - SUBJECTIVE AND OBJECTIVE BOX
Patient evaluated measured and fitted for rigid  LSO spinal  orthosis as requested by Orthopedics to aid in post op  care to assist in healing and recovery delivered by [facility]  [phone]

## 2022-05-04 NOTE — PHYSICAL THERAPY INITIAL EVALUATION ADULT - PRECAUTIONS/LIMITATIONS, REHAB EVAL
CONTINUED: Pt was on narcotic pain management with no relief either. Pt's current pain scale 8/10 at rest, 10/10 when active. Pt evaluated by Dr. Acuña for a scheduled L4-L5 Posterior Lumbar laminectomy, L2-L3 extension of fusion, L2-L3 level FRO L3-S1 and prone lateral lumbar interbody fusion L2-L3. Pt now presents POD#1 s/p lateral interbody fusion L2,3 with laminectomy; posterior L2-S1 fusion - closure by Plastic surgery with b/l paraspinal muscle flaps, and closure of side incisions 5/3/22./fall precautions/spinal precautions

## 2022-05-05 LAB
ANION GAP SERPL CALC-SCNC: 10 MMOL/L — SIGNIFICANT CHANGE UP (ref 5–17)
BASOPHILS # BLD AUTO: 0.01 K/UL — SIGNIFICANT CHANGE UP (ref 0–0.2)
BASOPHILS NFR BLD AUTO: 0.1 % — SIGNIFICANT CHANGE UP (ref 0–2)
BUN SERPL-MCNC: 10 MG/DL — SIGNIFICANT CHANGE UP (ref 7–23)
CALCIUM SERPL-MCNC: 8.5 MG/DL — SIGNIFICANT CHANGE UP (ref 8.4–10.5)
CHLORIDE SERPL-SCNC: 106 MMOL/L — SIGNIFICANT CHANGE UP (ref 96–108)
CO2 SERPL-SCNC: 26 MMOL/L — SIGNIFICANT CHANGE UP (ref 22–31)
CREAT SERPL-MCNC: 0.78 MG/DL — SIGNIFICANT CHANGE UP (ref 0.5–1.3)
EGFR: 95 ML/MIN/1.73M2 — SIGNIFICANT CHANGE UP
EOSINOPHIL # BLD AUTO: 0.01 K/UL — SIGNIFICANT CHANGE UP (ref 0–0.5)
EOSINOPHIL NFR BLD AUTO: 0.1 % — SIGNIFICANT CHANGE UP (ref 0–6)
GLUCOSE SERPL-MCNC: 135 MG/DL — HIGH (ref 70–99)
HCT VFR BLD CALC: 34 % — LOW (ref 39–50)
HGB BLD-MCNC: 10.7 G/DL — LOW (ref 13–17)
IMM GRANULOCYTES NFR BLD AUTO: 0.8 % — SIGNIFICANT CHANGE UP (ref 0–1.5)
LYMPHOCYTES # BLD AUTO: 1.13 K/UL — SIGNIFICANT CHANGE UP (ref 1–3.3)
LYMPHOCYTES # BLD AUTO: 10.7 % — LOW (ref 13–44)
MCHC RBC-ENTMCNC: 27.6 PG — SIGNIFICANT CHANGE UP (ref 27–34)
MCHC RBC-ENTMCNC: 31.5 GM/DL — LOW (ref 32–36)
MCV RBC AUTO: 87.6 FL — SIGNIFICANT CHANGE UP (ref 80–100)
MONOCYTES # BLD AUTO: 0.82 K/UL — SIGNIFICANT CHANGE UP (ref 0–0.9)
MONOCYTES NFR BLD AUTO: 7.8 % — SIGNIFICANT CHANGE UP (ref 2–14)
NEUTROPHILS # BLD AUTO: 8.5 K/UL — HIGH (ref 1.8–7.4)
NEUTROPHILS NFR BLD AUTO: 80.5 % — HIGH (ref 43–77)
NRBC # BLD: 0 /100 WBCS — SIGNIFICANT CHANGE UP (ref 0–0)
PLATELET # BLD AUTO: 189 K/UL — SIGNIFICANT CHANGE UP (ref 150–400)
POTASSIUM SERPL-MCNC: 4.5 MMOL/L — SIGNIFICANT CHANGE UP (ref 3.5–5.3)
POTASSIUM SERPL-SCNC: 4.5 MMOL/L — SIGNIFICANT CHANGE UP (ref 3.5–5.3)
RBC # BLD: 3.88 M/UL — LOW (ref 4.2–5.8)
RBC # FLD: 14.1 % — SIGNIFICANT CHANGE UP (ref 10.3–14.5)
SODIUM SERPL-SCNC: 142 MMOL/L — SIGNIFICANT CHANGE UP (ref 135–145)
WBC # BLD: 10.55 K/UL — HIGH (ref 3.8–10.5)
WBC # FLD AUTO: 10.55 K/UL — HIGH (ref 3.8–10.5)

## 2022-05-05 RX ADMIN — Medication 975 MILLIGRAM(S): at 13:12

## 2022-05-05 RX ADMIN — ATORVASTATIN CALCIUM 40 MILLIGRAM(S): 80 TABLET, FILM COATED ORAL at 21:56

## 2022-05-05 RX ADMIN — Medication 1 MILLIGRAM(S): at 21:56

## 2022-05-05 RX ADMIN — GABAPENTIN 100 MILLIGRAM(S): 400 CAPSULE ORAL at 05:52

## 2022-05-05 RX ADMIN — Medication 3 MILLIGRAM(S): at 03:48

## 2022-05-05 RX ADMIN — GABAPENTIN 800 MILLIGRAM(S): 400 CAPSULE ORAL at 21:54

## 2022-05-05 RX ADMIN — VALACYCLOVIR 500 MILLIGRAM(S): 500 TABLET, FILM COATED ORAL at 11:12

## 2022-05-05 RX ADMIN — HYDROMORPHONE HYDROCHLORIDE 0.5 MILLIGRAM(S): 2 INJECTION INTRAMUSCULAR; INTRAVENOUS; SUBCUTANEOUS at 15:00

## 2022-05-05 RX ADMIN — GABAPENTIN 100 MILLIGRAM(S): 400 CAPSULE ORAL at 13:11

## 2022-05-05 RX ADMIN — HYDROMORPHONE HYDROCHLORIDE 0.5 MILLIGRAM(S): 2 INJECTION INTRAMUSCULAR; INTRAVENOUS; SUBCUTANEOUS at 14:19

## 2022-05-05 RX ADMIN — SENNA PLUS 2 TABLET(S): 8.6 TABLET ORAL at 21:55

## 2022-05-05 RX ADMIN — Medication 975 MILLIGRAM(S): at 14:00

## 2022-05-05 RX ADMIN — Medication 25 MILLIGRAM(S): at 21:55

## 2022-05-05 RX ADMIN — FAMOTIDINE 20 MILLIGRAM(S): 10 INJECTION INTRAVENOUS at 17:41

## 2022-05-05 RX ADMIN — OXYCODONE HYDROCHLORIDE 10 MILLIGRAM(S): 5 TABLET ORAL at 13:09

## 2022-05-05 RX ADMIN — Medication 975 MILLIGRAM(S): at 22:55

## 2022-05-05 RX ADMIN — Medication 137 MICROGRAM(S): at 05:51

## 2022-05-05 RX ADMIN — QUETIAPINE FUMARATE 100 MILLIGRAM(S): 200 TABLET, FILM COATED ORAL at 21:55

## 2022-05-05 RX ADMIN — GABAPENTIN 100 MILLIGRAM(S): 400 CAPSULE ORAL at 21:56

## 2022-05-05 RX ADMIN — FAMOTIDINE 20 MILLIGRAM(S): 10 INJECTION INTRAVENOUS at 05:53

## 2022-05-05 RX ADMIN — GABAPENTIN 800 MILLIGRAM(S): 400 CAPSULE ORAL at 05:51

## 2022-05-05 RX ADMIN — Medication 975 MILLIGRAM(S): at 21:55

## 2022-05-05 RX ADMIN — Medication 975 MILLIGRAM(S): at 05:52

## 2022-05-05 RX ADMIN — OXYCODONE HYDROCHLORIDE 10 MILLIGRAM(S): 5 TABLET ORAL at 14:00

## 2022-05-05 RX ADMIN — BUPROPION HYDROCHLORIDE 150 MILLIGRAM(S): 150 TABLET, EXTENDED RELEASE ORAL at 05:55

## 2022-05-05 RX ADMIN — Medication 100 MILLIGRAM(S): at 21:55

## 2022-05-05 RX ADMIN — GABAPENTIN 800 MILLIGRAM(S): 400 CAPSULE ORAL at 13:10

## 2022-05-05 RX ADMIN — Medication 975 MILLIGRAM(S): at 06:52

## 2022-05-05 RX ADMIN — Medication 1 MILLIGRAM(S): at 11:04

## 2022-05-05 NOTE — PROGRESS NOTE ADULT - SUBJECTIVE AND OBJECTIVE BOX
Orthopedics    Robotic and Navigation assisted Prone Lateral Interbody Fusion L23 with  L2-3 Laminectomy, and Posterior L2-S1 fusion  Pt Seen and Examined sitting in chair at bedside. Pain is controlled. Feeling well overall. No nausea or vomiting. Reports he is overall doing well and improved from prior to surgery. Endorses persistent numbness in LLE    LABS:                        11.5   11.30 )-----------( 192      ( 04 May 2022 09:42 )             37.0     05-04    140  |  105  |  11  ----------------------------<  178<H>  3.9   |  22  |  0.68    Ca    8.1<L>      04 May 2022 09:42            VITAL SIGNS:  T(C): 36.8 (05-05-22 @ 04:55), Max: 36.9 (05-05-22 @ 00:02)  HR: 81 (05-05-22 @ 04:55) (72 - 81)  BP: 106/62 (05-05-22 @ 04:55) (106/62 - 135/76)  RR: 18 (05-05-22 @ 04:55) (18 - 18)  SpO2: 94% (05-05-22 @ 04:55) (93% - 97%)    Exam:  NAD AAOx3  central aquacel Dressing clean and dry, left sided HMV in place with clean dry dressing, right side REZA in place with very minimal saturation on dressing  Grossly moving all extremities  + DP    Motor:                         Hip Flex       Knee Ext           Dorsiflex      Hallux Ext        PlantarFlex  RIGHT            5/5                5/5                   5/5                 5/5                  5/5  LEFT               5/5                4/5                   5/5                 5/5                  5/5      Sensory:                      L2        L3       L4      L5       S1          RIGHT        2          2         2        2        2           (0=absent, 1=impaired, 2=normal, NT=not testable)  LEFT           1          1         1        1        2           (0=absent, 1=impaired, 2=normal, NT=not testable)      Negative Babinski bilaterally   Negative Myoclonus bilaterally    A/P:  Patient is a 71y Male s/p L2/3 prone lateral interbody fusion, L S1 screw, R L5 and S1 screws revised. Rods placed for L2-S1 fusion and posterolateral region construct. POD2    -FUdrain output  -FU LSO Brace  -Steroid Taper  -Pain control  -no chemical DVT/PE ppx  - WBAT/PT/OOB  -Med Mgmt  - FU Labs  - D/C Planning

## 2022-05-06 ENCOUNTER — TRANSCRIPTION ENCOUNTER (OUTPATIENT)
Age: 72
End: 2022-05-06

## 2022-05-06 LAB
ANION GAP SERPL CALC-SCNC: 9 MMOL/L — SIGNIFICANT CHANGE UP (ref 5–17)
BASOPHILS # BLD AUTO: 0.03 K/UL — SIGNIFICANT CHANGE UP (ref 0–0.2)
BASOPHILS NFR BLD AUTO: 0.4 % — SIGNIFICANT CHANGE UP (ref 0–2)
BUN SERPL-MCNC: 10 MG/DL — SIGNIFICANT CHANGE UP (ref 7–23)
CALCIUM SERPL-MCNC: 8.7 MG/DL — SIGNIFICANT CHANGE UP (ref 8.4–10.5)
CHLORIDE SERPL-SCNC: 106 MMOL/L — SIGNIFICANT CHANGE UP (ref 96–108)
CO2 SERPL-SCNC: 27 MMOL/L — SIGNIFICANT CHANGE UP (ref 22–31)
CREAT SERPL-MCNC: 0.68 MG/DL — SIGNIFICANT CHANGE UP (ref 0.5–1.3)
EGFR: 99 ML/MIN/1.73M2 — SIGNIFICANT CHANGE UP
EOSINOPHIL # BLD AUTO: 0.05 K/UL — SIGNIFICANT CHANGE UP (ref 0–0.5)
EOSINOPHIL NFR BLD AUTO: 0.6 % — SIGNIFICANT CHANGE UP (ref 0–6)
GLUCOSE SERPL-MCNC: 123 MG/DL — HIGH (ref 70–99)
HCT VFR BLD CALC: 33.7 % — LOW (ref 39–50)
HGB BLD-MCNC: 10.2 G/DL — LOW (ref 13–17)
IMM GRANULOCYTES NFR BLD AUTO: 1.9 % — HIGH (ref 0–1.5)
LYMPHOCYTES # BLD AUTO: 1.69 K/UL — SIGNIFICANT CHANGE UP (ref 1–3.3)
LYMPHOCYTES # BLD AUTO: 21.1 % — SIGNIFICANT CHANGE UP (ref 13–44)
MCHC RBC-ENTMCNC: 26.6 PG — LOW (ref 27–34)
MCHC RBC-ENTMCNC: 30.3 GM/DL — LOW (ref 32–36)
MCV RBC AUTO: 88 FL — SIGNIFICANT CHANGE UP (ref 80–100)
MONOCYTES # BLD AUTO: 0.92 K/UL — HIGH (ref 0–0.9)
MONOCYTES NFR BLD AUTO: 11.5 % — SIGNIFICANT CHANGE UP (ref 2–14)
NEUTROPHILS # BLD AUTO: 5.16 K/UL — SIGNIFICANT CHANGE UP (ref 1.8–7.4)
NEUTROPHILS NFR BLD AUTO: 64.5 % — SIGNIFICANT CHANGE UP (ref 43–77)
NRBC # BLD: 0 /100 WBCS — SIGNIFICANT CHANGE UP (ref 0–0)
PLATELET # BLD AUTO: 169 K/UL — SIGNIFICANT CHANGE UP (ref 150–400)
POTASSIUM SERPL-MCNC: 4.1 MMOL/L — SIGNIFICANT CHANGE UP (ref 3.5–5.3)
POTASSIUM SERPL-SCNC: 4.1 MMOL/L — SIGNIFICANT CHANGE UP (ref 3.5–5.3)
RBC # BLD: 3.83 M/UL — LOW (ref 4.2–5.8)
RBC # FLD: 14.1 % — SIGNIFICANT CHANGE UP (ref 10.3–14.5)
SODIUM SERPL-SCNC: 142 MMOL/L — SIGNIFICANT CHANGE UP (ref 135–145)
WBC # BLD: 8 K/UL — SIGNIFICANT CHANGE UP (ref 3.8–10.5)
WBC # FLD AUTO: 8 K/UL — SIGNIFICANT CHANGE UP (ref 3.8–10.5)

## 2022-05-06 RX ORDER — LIDOCAINE 4 G/100G
1 CREAM TOPICAL DAILY
Refills: 0 | Status: DISCONTINUED | OUTPATIENT
Start: 2022-05-06 | End: 2022-05-10

## 2022-05-06 RX ORDER — LIDOCAINE 4 G/100G
1 CREAM TOPICAL EVERY 24 HOURS
Refills: 0 | Status: DISCONTINUED | OUTPATIENT
Start: 2022-05-06 | End: 2022-05-10

## 2022-05-06 RX ADMIN — LIDOCAINE 1 PATCH: 4 CREAM TOPICAL at 12:01

## 2022-05-06 RX ADMIN — OXYCODONE HYDROCHLORIDE 10 MILLIGRAM(S): 5 TABLET ORAL at 22:52

## 2022-05-06 RX ADMIN — GABAPENTIN 800 MILLIGRAM(S): 400 CAPSULE ORAL at 13:31

## 2022-05-06 RX ADMIN — LIDOCAINE 1 PATCH: 4 CREAM TOPICAL at 20:01

## 2022-05-06 RX ADMIN — Medication 975 MILLIGRAM(S): at 22:52

## 2022-05-06 RX ADMIN — GABAPENTIN 100 MILLIGRAM(S): 400 CAPSULE ORAL at 05:40

## 2022-05-06 RX ADMIN — OXYCODONE HYDROCHLORIDE 10 MILLIGRAM(S): 5 TABLET ORAL at 17:47

## 2022-05-06 RX ADMIN — GABAPENTIN 100 MILLIGRAM(S): 400 CAPSULE ORAL at 13:31

## 2022-05-06 RX ADMIN — GABAPENTIN 800 MILLIGRAM(S): 400 CAPSULE ORAL at 22:22

## 2022-05-06 RX ADMIN — SENNA PLUS 2 TABLET(S): 8.6 TABLET ORAL at 22:22

## 2022-05-06 RX ADMIN — Medication 975 MILLIGRAM(S): at 06:40

## 2022-05-06 RX ADMIN — Medication 1 MILLIGRAM(S): at 11:56

## 2022-05-06 RX ADMIN — OXYCODONE HYDROCHLORIDE 10 MILLIGRAM(S): 5 TABLET ORAL at 06:40

## 2022-05-06 RX ADMIN — OXYCODONE HYDROCHLORIDE 10 MILLIGRAM(S): 5 TABLET ORAL at 14:30

## 2022-05-06 RX ADMIN — Medication 975 MILLIGRAM(S): at 05:40

## 2022-05-06 RX ADMIN — OXYCODONE HYDROCHLORIDE 10 MILLIGRAM(S): 5 TABLET ORAL at 05:41

## 2022-05-06 RX ADMIN — BUPROPION HYDROCHLORIDE 150 MILLIGRAM(S): 150 TABLET, EXTENDED RELEASE ORAL at 05:39

## 2022-05-06 RX ADMIN — Medication 975 MILLIGRAM(S): at 22:22

## 2022-05-06 RX ADMIN — GABAPENTIN 100 MILLIGRAM(S): 400 CAPSULE ORAL at 22:22

## 2022-05-06 RX ADMIN — Medication 975 MILLIGRAM(S): at 13:32

## 2022-05-06 RX ADMIN — QUETIAPINE FUMARATE 100 MILLIGRAM(S): 200 TABLET, FILM COATED ORAL at 22:22

## 2022-05-06 RX ADMIN — Medication 1 MILLIGRAM(S): at 11:55

## 2022-05-06 RX ADMIN — GABAPENTIN 800 MILLIGRAM(S): 400 CAPSULE ORAL at 05:40

## 2022-05-06 RX ADMIN — FAMOTIDINE 20 MILLIGRAM(S): 10 INJECTION INTRAVENOUS at 17:47

## 2022-05-06 RX ADMIN — Medication 1 MILLIGRAM(S): at 17:47

## 2022-05-06 RX ADMIN — ATORVASTATIN CALCIUM 40 MILLIGRAM(S): 80 TABLET, FILM COATED ORAL at 22:22

## 2022-05-06 RX ADMIN — LIDOCAINE 1 PATCH: 4 CREAM TOPICAL at 11:57

## 2022-05-06 RX ADMIN — VALACYCLOVIR 500 MILLIGRAM(S): 500 TABLET, FILM COATED ORAL at 11:57

## 2022-05-06 RX ADMIN — Medication 100 MILLIGRAM(S): at 22:22

## 2022-05-06 RX ADMIN — Medication 137 MICROGRAM(S): at 05:41

## 2022-05-06 RX ADMIN — OXYCODONE HYDROCHLORIDE 10 MILLIGRAM(S): 5 TABLET ORAL at 18:40

## 2022-05-06 RX ADMIN — Medication 975 MILLIGRAM(S): at 14:30

## 2022-05-06 RX ADMIN — MAGNESIUM HYDROXIDE 30 MILLILITER(S): 400 TABLET, CHEWABLE ORAL at 17:47

## 2022-05-06 RX ADMIN — OXYCODONE HYDROCHLORIDE 10 MILLIGRAM(S): 5 TABLET ORAL at 13:31

## 2022-05-06 RX ADMIN — FAMOTIDINE 20 MILLIGRAM(S): 10 INJECTION INTRAVENOUS at 05:41

## 2022-05-06 RX ADMIN — OXYCODONE HYDROCHLORIDE 10 MILLIGRAM(S): 5 TABLET ORAL at 22:22

## 2022-05-06 NOTE — DISCHARGE NOTE PROVIDER - CARE PROVIDERS DIRECT ADDRESSES
,[email or web address]

## 2022-05-06 NOTE — DISCHARGE NOTE PROVIDER - NSDCFUADDAPPT_GEN_ALL_CORE_FT
It is highly recommended you follow up with your PCP within 4 weeks to discuss  recent surgery/general checkup/possible medication adjustment.

## 2022-05-06 NOTE — DISCHARGE NOTE PROVIDER - NSDCCPTREATMENT_GEN_ALL_CORE_FT
PRINCIPAL PROCEDURE  Procedure: Direct lateral interbody fusion (DLIF) with removal of hardware  Findings and Treatment:       SECONDARY PROCEDURE  Procedure: Removal of spinal cord stimulator generator  Findings and Treatment:

## 2022-05-06 NOTE — DISCHARGE NOTE PROVIDER - NSDCFUSCHEDAPPT_GEN_ALL_CORE_FT
Felipe Acuña  [facility]  Scheduled Appointment: 05/08/2022

## 2022-05-06 NOTE — PROGRESS NOTE ADULT - SUBJECTIVE AND OBJECTIVE BOX
Orthopedics    Robotic and Navigation assisted Prone Lateral Interbody Fusion L23 with  L2-3 Laminectomy, and Posterior L2-S1 fusion  Pt Seen and Examined sitting in chair at bedside. Pain is controlled. Feeling well overall. No nausea or vomiting. Reports he is overall doing well and improved from prior to surgery. Endorses improving numbness in LLE. Endorsing R Rib pain since surgery    LABS:                        10.7   10.55 )-----------( 189      ( 05 May 2022 09:20 )             34.0     05-05    142  |  106  |  10  ----------------------------<  135<H>  4.5   |  26  |  0.78    Ca    8.5      05 May 2022 09:20            VITAL SIGNS:  T(C): 36.5 (05-06-22 @ 05:05), Max: 36.8 (05-05-22 @ 14:07)  HR: 73 (05-06-22 @ 05:05) (72 - 81)  BP: 109/63 (05-06-22 @ 05:05) (109/63 - 136/80)  RR: 18 (05-06-22 @ 05:05) (18 - 18)  SpO2: 95% (05-06-22 @ 05:05) (93% - 96%)    Exam:  NAD AAOx3  central aquacel Dressing clean and dry, left sided HMV in place with clean dry dressing, right side REZA in place with very minimal saturation on dressing  Grossly moving all extremities  + DP    Motor:                         Hip Flex       Knee Ext           Dorsiflex      Hallux Ext        PlantarFlex  RIGHT            5/5                5/5                   5/5                 5/5                  5/5  LEFT               5/5                4+/5                   5/5                 5/5                  5/5      Sensory:                      L2        L3       L4      L5       S1          RIGHT        2          2         2        2        2           (0=absent, 1=impaired, 2=normal, NT=not testable)  LEFT           1          2         2        2        2           (0=absent, 1=impaired, 2=normal, NT=not testable)      Negative Babinski bilaterally   Negative Myoclonus bilaterally    A/P:  Patient is a 71y Male s/p L2/3 prone lateral interbody fusion, L S1 screw, R L5 and S1 screws revised. Rods placed for L2-S1 fusion and posterolateral region construct. POD3    -Ordering lidocaine patch for R Rib pain; possibly 2/2 patient positioning  -FU drain output  -LSO Brace at BS  -Steroid Taper  -Pain control  -no chemical DVT/PE ppx  - WBAT/PT/OOB  -Med Mgmt  - FU Labs  - D/C Planning

## 2022-05-06 NOTE — DISCHARGE NOTE PROVIDER - CARE PROVIDER_API CALL
Felipe Acuña)  Orthopedics  [address]  [place] [address]  Phone: [phone]  Fax: [phone]  Follow Up Time:    Felipe Acuña)  Orthopedics  [address]  [place] [address]  Phone: [phone]  Fax: [phone]  Follow Up Time: 1 week    NADYA JUARES  General Surgery  [address] [address]  Phone: [phone]  Fax: [phone]  Follow Up Time: 1 week

## 2022-05-06 NOTE — DISCHARGE NOTE PROVIDER - PROVIDER TOKENS
missed  PROVIDER:[TOKEN:[[id number]]] PROVIDER:[TOKEN:[[id number]],FOLLOWUP:[1 week]],PROVIDER:[TOKEN:[[id number]],FOLLOWUP:[1 week]]

## 2022-05-06 NOTE — DISCHARGE NOTE PROVIDER - NSDCMRMEDTOKEN_GEN_ALL_CORE_FT
3:1 Commode: Dx: S/p Lumbar Fusion  buPROPion 150 mg/12 hours (SR) oral tablet, extended release: 1 tab(s) orally once a day  gabapentin: 900 milligram(s) orally 3 times a day  lactase enzyme: 125 milligram(s) orally once a day  Probiotic Formula (Bacillus Coagulans) oral capsule: 1 cap(s) orally once a day  Rolling Walker: Dx: S/p Lumbar Fusion  rosuvastatin 10 mg oral tablet: 1 tab(s) orally every other day  SEROquel 100 mg oral tablet: 1 tab(s) orally once a day (at bedtime)  Synthroid 137 mcg (0.137 mg) oral tablet: 1 tab(s) orally once a day  tlso brace: Dx: s/p spinal fusion  traZODone 100 mg oral tablet: 1 tab(s) orally once a day (at bedtime)  valACYclovir 500 mg oral tablet: 1 tab(s) orally once a day  Xanax 1 mg oral tablet: 1 tab(s) orally 2 times a day, As Needed   acetaminophen 325 mg oral tablet: 3 tab(s) orally every 8 hours  buPROPion 150 mg/12 hours (SR) oral tablet, extended release: 1 tab(s) orally once a day  cyclobenzaprine 5 mg oral tablet: 1 tab(s) orally every 8 hours, As needed, Muscle Spasm  gabapentin: 900 milligram(s) orally 3 times a day  oxyCODONE 5 mg oral tablet: 1 tab(s) orally every 4 hours, As needed, Moderate Pain (4 - 6)  Probiotic Formula (Bacillus Coagulans) oral capsule: 1 cap(s) orally once a day  rosuvastatin 10 mg oral tablet: 1 tab(s) orally every other day  senna oral tablet: 2 tab(s) orally once a day (at bedtime)  SEROquel 100 mg oral tablet: 1 tab(s) orally once a day (at bedtime)  Synthroid 137 mcg (0.137 mg) oral tablet: 1 tab(s) orally once a day  traMADol 50 mg oral tablet: 1 tab(s) orally every 6 hours, As needed, Mild Pain (1 - 3)  traZODone 100 mg oral tablet: 1 tab(s) orally once a day (at bedtime)  valACYclovir 500 mg oral tablet: 1 tab(s) orally once a day  Xanax 1 mg oral tablet: 1 tab(s) orally 2 times a day, As Needed   acetaminophen 325 mg oral tablet: 3 tabs orally every 6 hours As Needed for mild pain  buPROPion 150 mg/12 hours (SR) oral tablet, extended release: 1 tab(s) orally once a day  cyclobenzaprine 5 mg oral tablet: 1 tab orally every 8 hours As needed for Muscle Spasms MDD:3  gabapentin: 900 milligram(s) orally 3 times a day  oxyCODONE 5 mg oral tablet: 1 to 2  tabs orally every 4-6 hours, As needed for Moderate to Severe Pain   MDD:6  predniSONE 10 mg oral tablet: 3 tabs oral daily X 3 days, then  2 tabs oral daily X 3 days, then  1 tab oral daily X 3 days, then  stop MDD:9  Probiotic Formula (Bacillus Coagulans) oral capsule: 1 cap(s) orally once a day  rosuvastatin 10 mg oral tablet: 1 tab(s) orally every other day  senna oral tablet: 2 tabs orally once a day (at bedtime) to prevent constipation    MDD:2  SEROquel 100 mg oral tablet: 1 tab(s) orally once a day (at bedtime)  Synthroid 137 mcg (0.137 mg) oral tablet: 1 tab(s) orally once a day  traMADol 50 mg oral tablet: 1 tab orally every 6 hours, As needed for Mild to Moderate Pain  MDD:4  traZODone 100 mg oral tablet: 1 tab(s) orally once a day (at bedtime)  valACYclovir 500 mg oral tablet: 1 tab(s) orally once a day  Xanax 1 mg oral tablet: 1 tab(s) orally 2 times a day, As Needed

## 2022-05-06 NOTE — DISCHARGE NOTE PROVIDER - NSDCFUADDINST_GEN_ALL_CORE_FT
Follow up with   Follow up with Dr. Acuña in 3 days; on POD#10 - please call office to make appointment.  Activity: Ambulation as tolerated. No heavy lifting.  Dressing: Keep clean and dry at all times. Remove upon instructions on follow up visit.  Sponge Bathe ONLY. Do NOT shower until discuss with Dr. Acuña.   Follow up with Dr. Acuña in 1 week- please call office to make appointment.  Activity: Ambulation as tolerated with brace on. No heavy lifting.  Dressing: Keep clean and dry at all times. Remove upon instructions on first follow up visit with Dr. Lombardo office.  Sponge Bathe ONLY. Do NOT shower until discuss with Dr. Acuña office on first followup.    Please follow up with Plastic Surgeon in 1 week- call to make appointment.

## 2022-05-06 NOTE — DISCHARGE NOTE PROVIDER - HOSPITAL COURSE
Reason for Admission  Low back pain  Goal: I want to walk without pain     History of Present Illness:  History of Present Illness    71 yr old male with PMH of Anxiety, depression, HLD, GERD, BPH, s/p thyroidectomy, s/p squamous cell carcinoma s/p excision on nose and forehead 2021, Intestinal obstruction s/p colectomy, s/p prostatectomy, s/p cervical spine fusion 2007, lumbar spine fusion 2008, spinal cord stimulator 2020 & revision 2021. Pt reports more than 10 yrs of mid to low back pain (w/ radiating pain, tingling and numbness to right LE) with multiple surgeries and spinal cord stimulator to help with pain, no relief noted. Pt was on narcotic pain management with no relief either. Pt's current pain scale 8/10 at rest, 10/10 when active. Pt evaluated by Dr. Acuña for a scheduled L4-L5 Posterior Lumbar laminectomy, L2-L3 extension of fusion, L2-L3 level FRO L3-S1 and prone lateral lumbar interbody fusion L2-L3 on 5/11/22. Pt denies recent travel, sick contact or recent covid infection.      Past Medical, Past Surgical History:  PAST MEDICAL HISTORY:  Anxiety and depression   BPH (benign prostatic hyperplasia)   GERD (gastroesophageal reflux disease)   HLD (hyperlipidemia)   Peyronie's disease   SCC (squamous cell carcinoma) of nose.     PAST SURGICAL HISTORY:  H/O foot surgery right 2014  H/O prostatectomy 2008  H/O squamous cell carcinoma excision nose (~5-6 yrs ago) and forehead 2021  H/O ventral hernia repair umbilical/incisional repair 2016  History of ankle surgery 1992  S/P cataract surgery bilateral  S/P cervical spinal fusion 2007  S/P colectomy 1997  S/P insertion of spinal cord stimulator right 7/2021, 1st 2020  S/P lumbar spinal fusion 2008  S/P meniscectomy left 10/2019  S/P parathyroidectomy right  S/P repair of hydrocele right 2010  S/P thyroidectomy bilateral 2006.    HOSPITAL COURSE:  _ y/o _ underwent _ on _ with _.  Patient tolerated procedure well.  Patient was evaluated postoperatively by physical therapist and _.  Patient advised to keep surgical incision/dressing clean and dry, and have dressing and surgical staples removed and steri strips applied post op day #14.  Patient further advised to follow up with  _ in _. Reason for Admission  Low back pain  Goal: I want to walk without pain     History of Present Illness:  History of Present Illness    71 yr old male with PMH of Anxiety, depression, HLD, GERD, BPH, s/p thyroidectomy, s/p squamous cell carcinoma s/p excision on nose and forehead 2021, Intestinal obstruction s/p colectomy, s/p prostatectomy, s/p cervical spine fusion 2007, lumbar spine fusion 2008, spinal cord stimulator 2020 & revision 2021. Pt reports more than 10 yrs of mid to low back pain (w/ radiating pain, tingling and numbness to right LE) with multiple surgeries and spinal cord stimulator to help with pain, no relief noted. Pt was on narcotic pain management with no relief either. Pt's current pain scale 8/10 at rest, 10/10 when active. Pt evaluated by Dr. Acuña for a scheduled L4-L5 Posterior Lumbar laminectomy, L2-L3 extension of fusion, L2-L3 level FRO L3-S1 and prone lateral lumbar interbody fusion L2-L3 on 5/11/22. Pt denies recent travel, sick contact or recent covid infection.      Past Medical, Past Surgical History:  PAST MEDICAL HISTORY:  Anxiety and depression   BPH (benign prostatic hyperplasia)   GERD (gastroesophageal reflux disease)   HLD (hyperlipidemia)   Peyronie's disease   SCC (squamous cell carcinoma) of nose.     PAST SURGICAL HISTORY:  H/O foot surgery right 2014  H/O prostatectomy 2008  H/O squamous cell carcinoma excision nose (~5-6 yrs ago) and forehead 2021  H/O ventral hernia repair umbilical/incisional repair 2016  History of ankle surgery 1992  S/P cataract surgery bilateral  S/P cervical spinal fusion 2007  S/P colectomy 1997  S/P insertion of spinal cord stimulator right 7/2021, 1st 2020  S/P lumbar spinal fusion 2008  S/P meniscectomy left 10/2019  S/P parathyroidectomy right  S/P repair of hydrocele right 2010  S/P thyroidectomy bilateral 2006.    HOSPITAL COURSE:  Patient underwent L2-L3 Laminectomy/Direct lateral interbody fusion (DLIF) with removal of hardware without complications. Evaluated and treated by   physical therapy whom recommended home for discharge disposition. Received right intertrochanteric injection on 5/9/22 for pain management and   cleared for discharge by physical therapy.  Discharged to home on 5/10/22 with instructions to follow up with Dr. Acuña on POD#10.    Reason for Admission  Low back pain  Goal: I want to walk without pain     History of Present Illness:  History of Present Illness    71 yr old male with PMH of Anxiety, depression, HLD, GERD, BPH, s/p thyroidectomy, s/p squamous cell carcinoma s/p excision on nose and forehead 2021, Intestinal obstruction s/p colectomy, s/p prostatectomy, s/p cervical spine fusion 2007, lumbar spine fusion 2008, spinal cord stimulator 2020 & revision 2021. Pt reports more than 10 yrs of mid to low back pain (w/ radiating pain, tingling and numbness to right LE) with multiple surgeries and spinal cord stimulator to help with pain, no relief noted. Pt was on narcotic pain management with no relief either. Pt's current pain scale 8/10 at rest, 10/10 when active. Pt evaluated by Dr. Acuña for a scheduled L4-L5 Posterior Lumbar laminectomy, L2-L3 extension of fusion, L2-L3 level FRO L3-S1 and prone lateral lumbar interbody fusion L2-L3 on 5/11/22. Pt denies recent travel, sick contact or recent covid infection.      Past Medical, Past Surgical History:  PAST MEDICAL HISTORY:  Anxiety and depression   BPH (benign prostatic hyperplasia)   GERD (gastroesophageal reflux disease)   HLD (hyperlipidemia)   Peyronie's disease   SCC (squamous cell carcinoma) of nose.     PAST SURGICAL HISTORY:  H/O foot surgery right 2014  H/O prostatectomy 2008  H/O squamous cell carcinoma excision nose (~5-6 yrs ago) and forehead 2021  H/O ventral hernia repair umbilical/incisional repair 2016  History of ankle surgery 1992  S/P cataract surgery bilateral  S/P cervical spinal fusion 2007  S/P colectomy 1997  S/P insertion of spinal cord stimulator right 7/2021, 1st 2020  S/P lumbar spinal fusion 2008  S/P meniscectomy left 10/2019  S/P parathyroidectomy right  S/P repair of hydrocele right 2010  S/P thyroidectomy bilateral 2006.    HOSPITAL COURSE:  Patient underwent L2-L3 Laminectomy/Direct lateral interbody fusion (DLIF) with removal of hardware without complications. Evaluated and treated by   physical therapy whom recommended home with outpatient PT for discharge disposition. Received right trochanteric bursa injection on 5/9/22 for pain management and   cleared for discharge by physical therapy.  Discharged to home on 5/10/22 with instructions to follow up with Dr. Acuña on POD#10.    Reason for Admission  Low back pain  Goal: I want to walk without pain     History of Present Illness:  History of Present Illness    71 yr old male with PMH of Anxiety, depression, HLD, GERD, BPH, s/p thyroidectomy, s/p squamous cell carcinoma s/p excision on nose and forehead 2021, Intestinal obstruction s/p colectomy, s/p prostatectomy, s/p cervical spine fusion 2007, lumbar spine fusion 2008, spinal cord stimulator 2020 & revision 2021. Pt reports more than 10 yrs of mid to low back pain (w/ radiating pain, tingling and numbness to right LE) with multiple surgeries and spinal cord stimulator to help with pain, no relief noted. Pt was on narcotic pain management with no relief either. Pt's current pain scale 8/10 at rest, 10/10 when active. Pt evaluated by Dr. Acuña for a scheduled L4-L5 Posterior Lumbar laminectomy, L2-L3 extension of fusion, L2-L3 level FRO L3-S1 and prone lateral lumbar interbody fusion L2-L3 on 5/11/22. Pt denies recent travel, sick contact or recent covid infection.      Past Medical, Past Surgical History:  PAST MEDICAL HISTORY:  Anxiety and depression   BPH (benign prostatic hyperplasia)   GERD (gastroesophageal reflux disease)   HLD (hyperlipidemia)   Peyronie's disease   SCC (squamous cell carcinoma) of nose.     PAST SURGICAL HISTORY:  H/O foot surgery right 2014  H/O prostatectomy 2008  H/O squamous cell carcinoma excision nose (~5-6 yrs ago) and forehead 2021  H/O ventral hernia repair umbilical/incisional repair 2016  History of ankle surgery 1992  S/P cataract surgery bilateral  S/P cervical spinal fusion 2007  S/P colectomy 1997  S/P insertion of spinal cord stimulator right 7/2021, 1st 2020  S/P lumbar spinal fusion 2008  S/P meniscectomy left 10/2019  S/P parathyroidectomy right  S/P repair of hydrocele right 2010  S/P thyroidectomy bilateral 2006.    HOSPITAL COURSE:  Patient underwent L2-L3 Laminectomy/Direct lateral interbody fusion (DLIF) with removal of hardware without complications. Neurosurgery performed removal   of spinal cord stimulator at beginning of surgery. Plastic Surgery performed complex closure of side incisions. Evaluated and treated by   physical therapy whom recommended home with outpatient PT for discharge disposition. Received right trochanteric bursa injection on 5/9/22 for pain management and   cleared for discharge by physical therapy.  Discharged to home on 5/10/22 with instructions to follow up with Dr. Acuña on POD#10.

## 2022-05-07 PROCEDURE — 71101 X-RAY EXAM UNILAT RIBS/CHEST: CPT | Mod: 26

## 2022-05-07 PROCEDURE — 72100 X-RAY EXAM L-S SPINE 2/3 VWS: CPT | Mod: 26

## 2022-05-07 RX ADMIN — GABAPENTIN 800 MILLIGRAM(S): 400 CAPSULE ORAL at 21:43

## 2022-05-07 RX ADMIN — GABAPENTIN 100 MILLIGRAM(S): 400 CAPSULE ORAL at 13:20

## 2022-05-07 RX ADMIN — OXYCODONE HYDROCHLORIDE 10 MILLIGRAM(S): 5 TABLET ORAL at 18:24

## 2022-05-07 RX ADMIN — GABAPENTIN 800 MILLIGRAM(S): 400 CAPSULE ORAL at 13:20

## 2022-05-07 RX ADMIN — QUETIAPINE FUMARATE 100 MILLIGRAM(S): 200 TABLET, FILM COATED ORAL at 23:02

## 2022-05-07 RX ADMIN — Medication 137 MICROGRAM(S): at 06:11

## 2022-05-07 RX ADMIN — GABAPENTIN 100 MILLIGRAM(S): 400 CAPSULE ORAL at 21:43

## 2022-05-07 RX ADMIN — LIDOCAINE 1 PATCH: 4 CREAM TOPICAL at 19:30

## 2022-05-07 RX ADMIN — OXYCODONE HYDROCHLORIDE 10 MILLIGRAM(S): 5 TABLET ORAL at 09:57

## 2022-05-07 RX ADMIN — OXYCODONE HYDROCHLORIDE 10 MILLIGRAM(S): 5 TABLET ORAL at 22:45

## 2022-05-07 RX ADMIN — LIDOCAINE 1 PATCH: 4 CREAM TOPICAL at 12:03

## 2022-05-07 RX ADMIN — Medication 975 MILLIGRAM(S): at 21:43

## 2022-05-07 RX ADMIN — Medication 100 MILLIGRAM(S): at 23:02

## 2022-05-07 RX ADMIN — Medication 975 MILLIGRAM(S): at 13:20

## 2022-05-07 RX ADMIN — OXYCODONE HYDROCHLORIDE 10 MILLIGRAM(S): 5 TABLET ORAL at 21:42

## 2022-05-07 RX ADMIN — CYCLOBENZAPRINE HYDROCHLORIDE 5 MILLIGRAM(S): 10 TABLET, FILM COATED ORAL at 10:08

## 2022-05-07 RX ADMIN — SENNA PLUS 2 TABLET(S): 8.6 TABLET ORAL at 21:44

## 2022-05-07 RX ADMIN — VALACYCLOVIR 500 MILLIGRAM(S): 500 TABLET, FILM COATED ORAL at 12:08

## 2022-05-07 RX ADMIN — BUPROPION HYDROCHLORIDE 150 MILLIGRAM(S): 150 TABLET, EXTENDED RELEASE ORAL at 06:46

## 2022-05-07 RX ADMIN — Medication 1 MILLIGRAM(S): at 06:15

## 2022-05-07 RX ADMIN — CYCLOBENZAPRINE HYDROCHLORIDE 5 MILLIGRAM(S): 10 TABLET, FILM COATED ORAL at 19:39

## 2022-05-07 RX ADMIN — OXYCODONE HYDROCHLORIDE 10 MILLIGRAM(S): 5 TABLET ORAL at 08:57

## 2022-05-07 RX ADMIN — LIDOCAINE 1 PATCH: 4 CREAM TOPICAL at 12:04

## 2022-05-07 RX ADMIN — GABAPENTIN 100 MILLIGRAM(S): 400 CAPSULE ORAL at 06:10

## 2022-05-07 RX ADMIN — FAMOTIDINE 20 MILLIGRAM(S): 10 INJECTION INTRAVENOUS at 17:28

## 2022-05-07 RX ADMIN — MAGNESIUM HYDROXIDE 30 MILLILITER(S): 400 TABLET, CHEWABLE ORAL at 06:12

## 2022-05-07 RX ADMIN — ATORVASTATIN CALCIUM 40 MILLIGRAM(S): 80 TABLET, FILM COATED ORAL at 21:44

## 2022-05-07 RX ADMIN — Medication 975 MILLIGRAM(S): at 06:11

## 2022-05-07 RX ADMIN — LIDOCAINE 1 PATCH: 4 CREAM TOPICAL at 00:01

## 2022-05-07 RX ADMIN — Medication 975 MILLIGRAM(S): at 22:45

## 2022-05-07 RX ADMIN — FAMOTIDINE 20 MILLIGRAM(S): 10 INJECTION INTRAVENOUS at 06:10

## 2022-05-07 RX ADMIN — Medication 975 MILLIGRAM(S): at 06:40

## 2022-05-07 RX ADMIN — GABAPENTIN 800 MILLIGRAM(S): 400 CAPSULE ORAL at 06:10

## 2022-05-07 RX ADMIN — OXYCODONE HYDROCHLORIDE 10 MILLIGRAM(S): 5 TABLET ORAL at 17:28

## 2022-05-07 NOTE — PROGRESS NOTE ADULT - SUBJECTIVE AND OBJECTIVE BOX
Patient is a 71y old  Male who presents with a chief complaint of Back pain, leg pain, weakness (06 May 2022 08:03)      POST OPERATIVE DAY #:  4  Patient comfortable  No complaints, Sitting at bedside    T(C): 36.6 (05-07-22 @ 04:52), Max: 36.9 (05-06-22 @ 13:38)  HR: 75 (05-07-22 @ 04:52) (69 - 81)  BP: 118/61 (05-07-22 @ 04:52) (109/72 - 138/82)  RR: 18 (05-07-22 @ 04:52) (18 - 18)  SpO2: 95% (05-07-22 @ 04:52) (93% - 95%)  Wt(kg): --    PHYSICAL EXAM:  NAD, Alert  Back: Aquacel Dressing C/D/I; sensation grossly intact to light touch; (+) Distal Pulses; No Calf tenderness B/L, PAS       HMV drain 10/80;  REZA drain 30/80             [ ] Lower extremeity                  PF          DF         EHL       FHL                                                                                            R        5/5        5/5        5/5       5/5                                                        L         5/5        5/5        5/5       5/5      LABS:                        10.2   8.00  )-----------( 169      ( 06 May 2022 06:26 )             33.7     05-06    142  |  106  |  10  ----------------------------<  123<H>  4.1   |  27  |  0.68    Ca    8.7      06 May 2022 06:26

## 2022-05-08 RX ADMIN — GABAPENTIN 800 MILLIGRAM(S): 400 CAPSULE ORAL at 14:23

## 2022-05-08 RX ADMIN — GABAPENTIN 100 MILLIGRAM(S): 400 CAPSULE ORAL at 14:23

## 2022-05-08 RX ADMIN — GABAPENTIN 800 MILLIGRAM(S): 400 CAPSULE ORAL at 05:48

## 2022-05-08 RX ADMIN — LIDOCAINE 1 PATCH: 4 CREAM TOPICAL at 11:54

## 2022-05-08 RX ADMIN — GABAPENTIN 100 MILLIGRAM(S): 400 CAPSULE ORAL at 05:48

## 2022-05-08 RX ADMIN — CYCLOBENZAPRINE HYDROCHLORIDE 5 MILLIGRAM(S): 10 TABLET, FILM COATED ORAL at 10:02

## 2022-05-08 RX ADMIN — OXYCODONE HYDROCHLORIDE 10 MILLIGRAM(S): 5 TABLET ORAL at 22:30

## 2022-05-08 RX ADMIN — FAMOTIDINE 20 MILLIGRAM(S): 10 INJECTION INTRAVENOUS at 18:18

## 2022-05-08 RX ADMIN — Medication 975 MILLIGRAM(S): at 21:30

## 2022-05-08 RX ADMIN — Medication 40 MILLIGRAM(S): at 11:54

## 2022-05-08 RX ADMIN — Medication 137 MICROGRAM(S): at 05:48

## 2022-05-08 RX ADMIN — VALACYCLOVIR 500 MILLIGRAM(S): 500 TABLET, FILM COATED ORAL at 11:54

## 2022-05-08 RX ADMIN — Medication 975 MILLIGRAM(S): at 22:30

## 2022-05-08 RX ADMIN — Medication 975 MILLIGRAM(S): at 15:22

## 2022-05-08 RX ADMIN — Medication 975 MILLIGRAM(S): at 06:50

## 2022-05-08 RX ADMIN — QUETIAPINE FUMARATE 100 MILLIGRAM(S): 200 TABLET, FILM COATED ORAL at 23:12

## 2022-05-08 RX ADMIN — OXYCODONE HYDROCHLORIDE 10 MILLIGRAM(S): 5 TABLET ORAL at 11:01

## 2022-05-08 RX ADMIN — GABAPENTIN 800 MILLIGRAM(S): 400 CAPSULE ORAL at 21:30

## 2022-05-08 RX ADMIN — OXYCODONE HYDROCHLORIDE 10 MILLIGRAM(S): 5 TABLET ORAL at 10:01

## 2022-05-08 RX ADMIN — FAMOTIDINE 20 MILLIGRAM(S): 10 INJECTION INTRAVENOUS at 05:48

## 2022-05-08 RX ADMIN — LIDOCAINE 1 PATCH: 4 CREAM TOPICAL at 20:00

## 2022-05-08 RX ADMIN — OXYCODONE HYDROCHLORIDE 10 MILLIGRAM(S): 5 TABLET ORAL at 15:22

## 2022-05-08 RX ADMIN — Medication 1 MILLIGRAM(S): at 23:17

## 2022-05-08 RX ADMIN — Medication 975 MILLIGRAM(S): at 05:49

## 2022-05-08 RX ADMIN — LIDOCAINE 1 PATCH: 4 CREAM TOPICAL at 00:10

## 2022-05-08 RX ADMIN — OXYCODONE HYDROCHLORIDE 10 MILLIGRAM(S): 5 TABLET ORAL at 05:48

## 2022-05-08 RX ADMIN — CYCLOBENZAPRINE HYDROCHLORIDE 5 MILLIGRAM(S): 10 TABLET, FILM COATED ORAL at 18:18

## 2022-05-08 RX ADMIN — BUPROPION HYDROCHLORIDE 150 MILLIGRAM(S): 150 TABLET, EXTENDED RELEASE ORAL at 05:48

## 2022-05-08 RX ADMIN — OXYCODONE HYDROCHLORIDE 10 MILLIGRAM(S): 5 TABLET ORAL at 21:30

## 2022-05-08 RX ADMIN — Medication 975 MILLIGRAM(S): at 14:22

## 2022-05-08 RX ADMIN — Medication 100 MILLIGRAM(S): at 23:12

## 2022-05-08 RX ADMIN — OXYCODONE HYDROCHLORIDE 10 MILLIGRAM(S): 5 TABLET ORAL at 14:22

## 2022-05-08 RX ADMIN — OXYCODONE HYDROCHLORIDE 10 MILLIGRAM(S): 5 TABLET ORAL at 06:50

## 2022-05-08 RX ADMIN — ATORVASTATIN CALCIUM 40 MILLIGRAM(S): 80 TABLET, FILM COATED ORAL at 21:31

## 2022-05-08 RX ADMIN — GABAPENTIN 100 MILLIGRAM(S): 400 CAPSULE ORAL at 21:31

## 2022-05-08 NOTE — PROGRESS NOTE ADULT - SUBJECTIVE AND OBJECTIVE BOX
Plastic Surgery Progress Note    Subjective: seen on rounds, no issues    Objective:  Exam:   General: NAD  Neuro: Alert  Pulm: comfortable  Back: soft, no collections, incisions intact, drains with s/s output    Vital Signs Last 24 Hrs  T(C): 36.7 (08 May 2022 05:35), Max: 36.8 (07 May 2022 16:57)  T(F): 98.1 (08 May 2022 05:35), Max: 98.3 (07 May 2022 16:57)  HR: 73 (08 May 2022 05:35) (66 - 78)  BP: 106/56 (08 May 2022 05:35) (106/56 - 121/63)  BP(mean): --  RR: 18 (08 May 2022 05:35) (16 - 18)  SpO2: 92% (08 May 2022 05:35) (92% - 94%)    I&O's Detail    07 May 2022 07:01  -  08 May 2022 07:00  --------------------------------------------------------  IN:    Oral Fluid: 1320 mL  Total IN: 1320 mL    OUT:    Accordian (mL): 70 mL    Bulb (mL): 120 mL    Stool (mL): 1 mL    Voided (mL): 1600 mL  Total OUT: 1791 mL    Total NET: -471 mL      MEDICATIONS  (STANDING):  acetaminophen     Tablet .. 975 milliGRAM(s) Oral every 8 hours  acetaminophen   IVPB .. 1000 milliGRAM(s) IV Intermittent once  atorvastatin 40 milliGRAM(s) Oral at bedtime  buPROPion XL (24-Hour) . 150 milliGRAM(s) Oral daily  ceFAZolin   IVPB 2000 milliGRAM(s) IV Intermittent once  famotidine    Tablet 20 milliGRAM(s) Oral two times a day  gabapentin 800 milliGRAM(s) Oral three times a day  gabapentin 100 milliGRAM(s) Oral three times a day  levothyroxine 137 MICROGram(s) Oral daily  lidocaine   4% Patch 1 Patch Transdermal daily  lidocaine   4% Patch 1 Patch Transdermal every 24 hours  QUEtiapine 100 milliGRAM(s) Oral at bedtime  senna 2 Tablet(s) Oral at bedtime  traZODone 100 milliGRAM(s) Oral at bedtime  valACYclovir 500 milliGRAM(s) Oral daily    MEDICATIONS  (PRN):  ALPRAZolam 1 milliGRAM(s) Oral two times a day PRN anxiety  cyclobenzaprine 5 milliGRAM(s) Oral every 8 hours PRN Muscle Spasm  diphenhydrAMINE 25 milliGRAM(s) Oral every 6 hours PRN Rash and/or Itching  HYDROmorphone  Injectable 0.5 milliGRAM(s) IV Push every 6 hours PRN breakthrough  magnesium hydroxide Suspension 30 milliLiter(s) Oral every 12 hours PRN Constipation  ondansetron Injectable 4 milliGRAM(s) IV Push every 6 hours PRN Nausea  oxyCODONE    IR 10 milliGRAM(s) Oral every 4 hours PRN Severe Pain (7 - 10)  oxyCODONE    IR 5 milliGRAM(s) Oral every 4 hours PRN Moderate Pain (4 - 6)  traMADol 50 milliGRAM(s) Oral every 6 hours PRN Mild Pain (1 - 3)

## 2022-05-08 NOTE — CHART NOTE - NSCHARTNOTEFT_GEN_A_CORE
Addendum    Spoke to Dr Acuña will remove the HMV today and take REZA off suction  Will also start on a steroid taper for RLE radiculopathy     Neela Riggs PA-C   Beeper    [phone]

## 2022-05-08 NOTE — PROGRESS NOTE ADULT - SUBJECTIVE AND OBJECTIVE BOX
Patient is a 71y old  Male who presents with a chief complaint of Back pain, leg pain, weakness (06 May 2022 08:03)      POST OPERATIVE DAY #:  5  Patient OOB in chair,  c/o right sided pain radiating down his leg starting last evening.  Presently pain has subsided    T(C): 36.7 (05-08-22 @ 05:35), Max: 36.8 (05-07-22 @ 16:57)  HR: 73 (05-08-22 @ 05:35) (66 - 78)  BP: 106/56 (05-08-22 @ 05:35) (106/56 - 121/63)  RR: 18 (05-08-22 @ 05:35) (16 - 18)  SpO2: 92% (05-08-22 @ 05:35) (92% - 94%)  Wt(kg): --    PHYSICAL EXAM:  NAD, Alert  Back: Dressings intact  HMV 50/70;  REZA 50/120  sensation slightly decrease RLE compared to LLE  [ ] Lower extremeity                  PF          DF         EHL       FHL                                                                                            R        5/5        5/5        5/5       5/5                                                        L         5/5        5/5        5/5       5/5      pulses 2+

## 2022-05-09 RX ORDER — LIDOCAINE HCL 20 MG/ML
2 VIAL (ML) INJECTION ONCE
Refills: 0 | Status: COMPLETED | OUTPATIENT
Start: 2022-05-09 | End: 2022-05-09

## 2022-05-09 RX ORDER — TRIAMCINOLONE 4 MG
1 TABLET ORAL ONCE
Refills: 0 | Status: COMPLETED | OUTPATIENT
Start: 2022-05-09 | End: 2022-05-09

## 2022-05-09 RX ADMIN — QUETIAPINE FUMARATE 100 MILLIGRAM(S): 200 TABLET, FILM COATED ORAL at 22:37

## 2022-05-09 RX ADMIN — GABAPENTIN 800 MILLIGRAM(S): 400 CAPSULE ORAL at 06:32

## 2022-05-09 RX ADMIN — Medication 975 MILLIGRAM(S): at 06:31

## 2022-05-09 RX ADMIN — FAMOTIDINE 20 MILLIGRAM(S): 10 INJECTION INTRAVENOUS at 06:31

## 2022-05-09 RX ADMIN — Medication 975 MILLIGRAM(S): at 22:38

## 2022-05-09 RX ADMIN — FAMOTIDINE 20 MILLIGRAM(S): 10 INJECTION INTRAVENOUS at 18:08

## 2022-05-09 RX ADMIN — GABAPENTIN 800 MILLIGRAM(S): 400 CAPSULE ORAL at 13:51

## 2022-05-09 RX ADMIN — Medication 40 MILLIGRAM(S): at 06:33

## 2022-05-09 RX ADMIN — GABAPENTIN 100 MILLIGRAM(S): 400 CAPSULE ORAL at 22:37

## 2022-05-09 RX ADMIN — LIDOCAINE 1 PATCH: 4 CREAM TOPICAL at 12:32

## 2022-05-09 RX ADMIN — GABAPENTIN 100 MILLIGRAM(S): 400 CAPSULE ORAL at 13:51

## 2022-05-09 RX ADMIN — OXYCODONE HYDROCHLORIDE 10 MILLIGRAM(S): 5 TABLET ORAL at 06:32

## 2022-05-09 RX ADMIN — Medication 975 MILLIGRAM(S): at 07:30

## 2022-05-09 RX ADMIN — ATORVASTATIN CALCIUM 40 MILLIGRAM(S): 80 TABLET, FILM COATED ORAL at 22:37

## 2022-05-09 RX ADMIN — Medication 2 MILLILITER(S): at 10:00

## 2022-05-09 RX ADMIN — Medication 1 MILLIGRAM(S): at 22:41

## 2022-05-09 RX ADMIN — Medication 1 MILLIGRAM(S): at 10:00

## 2022-05-09 RX ADMIN — LIDOCAINE 1 PATCH: 4 CREAM TOPICAL at 19:43

## 2022-05-09 RX ADMIN — GABAPENTIN 100 MILLIGRAM(S): 400 CAPSULE ORAL at 06:31

## 2022-05-09 RX ADMIN — Medication 100 MILLIGRAM(S): at 22:37

## 2022-05-09 RX ADMIN — GABAPENTIN 800 MILLIGRAM(S): 400 CAPSULE ORAL at 22:40

## 2022-05-09 RX ADMIN — LIDOCAINE 1 PATCH: 4 CREAM TOPICAL at 00:30

## 2022-05-09 RX ADMIN — VALACYCLOVIR 500 MILLIGRAM(S): 500 TABLET, FILM COATED ORAL at 12:30

## 2022-05-09 RX ADMIN — BUPROPION HYDROCHLORIDE 150 MILLIGRAM(S): 150 TABLET, EXTENDED RELEASE ORAL at 06:44

## 2022-05-09 RX ADMIN — Medication 975 MILLIGRAM(S): at 14:45

## 2022-05-09 RX ADMIN — Medication 1 MILLIGRAM(S): at 09:06

## 2022-05-09 RX ADMIN — Medication 137 MICROGRAM(S): at 06:32

## 2022-05-09 RX ADMIN — OXYCODONE HYDROCHLORIDE 10 MILLIGRAM(S): 5 TABLET ORAL at 07:30

## 2022-05-09 RX ADMIN — Medication 975 MILLIGRAM(S): at 13:51

## 2022-05-09 NOTE — CHART NOTE - NSCHARTNOTEFT_GEN_A_CORE
Procedure:  Procedure explained and risks, benefits, and alternatives to procedure discussed with patient at bedside. Patient expressed full understanding and all questions were answered. Consent for the procedure was obtained from patient. Under aseptic conditions, a local was administered to the right greater trochanteric bursa using 2cc of 1% lidocaine and 1cc of 40mg of Kenalog. The patient tolerated the procedure well and there we no complications.

## 2022-05-09 NOTE — PROGRESS NOTE ADULT - SUBJECTIVE AND OBJECTIVE BOX
Patient comfortable  No complaints  Had BM yesterday.      T(C): 36.4 (05-09-22 @ 05:05), Max: 36.9 (05-08-22 @ 12:46)  HR: 72 (05-09-22 @ 05:05) (63 - 92)  BP: 121/69 (05-09-22 @ 05:05) (116/69 - 130/84)  RR: 16 (05-09-22 @ 05:05) (16 - 18)  SpO2: 95% (05-09-22 @ 05:05) (93% - 96%)  REZA=0/20cc (off suction)    PHYSICAL EXAM:  NAD, Alert and oriented X3  Back: Dressing C/D/I; REZA in place, dull sensation grossly intact to light touch; (+) Distal Pulses; No Calf tenderness B/L, PAS                   [x] Lower extremity                         PF          DF         EHL       FHL                                                                                            R        5/5        5/5        5/5       5/5                                                        L         5/5        5/5        5/5       5/5      LABS:

## 2022-05-10 ENCOUNTER — TRANSCRIPTION ENCOUNTER (OUTPATIENT)
Age: 72
End: 2022-05-10

## 2022-05-10 VITALS
SYSTOLIC BLOOD PRESSURE: 134 MMHG | OXYGEN SATURATION: 95 % | HEART RATE: 89 BPM | TEMPERATURE: 99 F | DIASTOLIC BLOOD PRESSURE: 78 MMHG | RESPIRATION RATE: 16 BRPM

## 2022-05-10 PROCEDURE — U0005: CPT

## 2022-05-10 PROCEDURE — C1713: CPT

## 2022-05-10 PROCEDURE — 97162 PT EVAL MOD COMPLEX 30 MIN: CPT

## 2022-05-10 PROCEDURE — 97530 THERAPEUTIC ACTIVITIES: CPT

## 2022-05-10 PROCEDURE — 97535 SELF CARE MNGMENT TRAINING: CPT

## 2022-05-10 PROCEDURE — 85025 COMPLETE CBC W/AUTO DIFF WBC: CPT

## 2022-05-10 PROCEDURE — U0003: CPT

## 2022-05-10 PROCEDURE — C1889: CPT

## 2022-05-10 PROCEDURE — C9803: CPT

## 2022-05-10 PROCEDURE — 97110 THERAPEUTIC EXERCISES: CPT

## 2022-05-10 PROCEDURE — 72100 X-RAY EXAM L-S SPINE 2/3 VWS: CPT

## 2022-05-10 PROCEDURE — 97166 OT EVAL MOD COMPLEX 45 MIN: CPT

## 2022-05-10 PROCEDURE — S2900: CPT

## 2022-05-10 PROCEDURE — 71101 X-RAY EXAM UNILAT RIBS/CHEST: CPT

## 2022-05-10 PROCEDURE — 97116 GAIT TRAINING THERAPY: CPT

## 2022-05-10 PROCEDURE — 76000 FLUOROSCOPY <1 HR PHYS/QHP: CPT

## 2022-05-10 PROCEDURE — 36415 COLL VENOUS BLD VENIPUNCTURE: CPT

## 2022-05-10 PROCEDURE — 80048 BASIC METABOLIC PNL TOTAL CA: CPT

## 2022-05-10 RX ORDER — TRAMADOL HYDROCHLORIDE 50 MG/1
1 TABLET ORAL
Qty: 0 | Refills: 0 | DISCHARGE
Start: 2022-05-10

## 2022-05-10 RX ORDER — TRAMADOL HYDROCHLORIDE 50 MG/1
1 TABLET ORAL
Qty: 28 | Refills: 0
Start: 2022-05-10 | End: 2022-05-16

## 2022-05-10 RX ORDER — OXYCODONE HYDROCHLORIDE 5 MG/1
1 TABLET ORAL
Qty: 42 | Refills: 0
Start: 2022-05-10 | End: 2022-05-16

## 2022-05-10 RX ORDER — SENNA PLUS 8.6 MG/1
2 TABLET ORAL
Qty: 28 | Refills: 0
Start: 2022-05-10 | End: 2022-05-23

## 2022-05-10 RX ORDER — OXYCODONE HYDROCHLORIDE 5 MG/1
1 TABLET ORAL
Qty: 0 | Refills: 0 | DISCHARGE
Start: 2022-05-10

## 2022-05-10 RX ORDER — SENNA PLUS 8.6 MG/1
2 TABLET ORAL
Qty: 0 | Refills: 0 | DISCHARGE
Start: 2022-05-10

## 2022-05-10 RX ORDER — ACETAMINOPHEN 500 MG
3 TABLET ORAL
Qty: 0 | Refills: 0 | DISCHARGE
Start: 2022-05-10

## 2022-05-10 RX ORDER — CYCLOBENZAPRINE HYDROCHLORIDE 10 MG/1
1 TABLET, FILM COATED ORAL
Qty: 42 | Refills: 0
Start: 2022-05-10 | End: 2022-05-23

## 2022-05-10 RX ORDER — CYCLOBENZAPRINE HYDROCHLORIDE 10 MG/1
1 TABLET, FILM COATED ORAL
Qty: 0 | Refills: 0 | DISCHARGE
Start: 2022-05-10

## 2022-05-10 RX ADMIN — Medication 975 MILLIGRAM(S): at 06:27

## 2022-05-10 RX ADMIN — GABAPENTIN 100 MILLIGRAM(S): 400 CAPSULE ORAL at 06:28

## 2022-05-10 RX ADMIN — Medication 137 MICROGRAM(S): at 06:26

## 2022-05-10 RX ADMIN — GABAPENTIN 800 MILLIGRAM(S): 400 CAPSULE ORAL at 06:29

## 2022-05-10 RX ADMIN — BUPROPION HYDROCHLORIDE 150 MILLIGRAM(S): 150 TABLET, EXTENDED RELEASE ORAL at 06:26

## 2022-05-10 RX ADMIN — Medication 40 MILLIGRAM(S): at 06:27

## 2022-05-10 RX ADMIN — LIDOCAINE 1 PATCH: 4 CREAM TOPICAL at 00:01

## 2022-05-10 RX ADMIN — FAMOTIDINE 20 MILLIGRAM(S): 10 INJECTION INTRAVENOUS at 06:29

## 2022-05-10 NOTE — DISCHARGE NOTE NURSING/CASE MANAGEMENT/SOCIAL WORK - PATIENT PORTAL LINK FT
You can access the FollowMyHealth Patient Portal offered by [facility] by registering at the following website: [email or web address]. By joining [employer]’s FollowMyHealth portal, you will also be able to view your health information using other applications (apps) compatible with our system.

## 2022-05-10 NOTE — PROGRESS NOTE ADULT - ASSESSMENT
A/P: 70 y/o M POD#6 s/p L2-L3 Lami      Pain management prn  REZA to gravity/D/C today????  PT  Ambulation as tolerated  Discharge planning to home  D/W attending      MANASA Salazar  Orthopedic Surgery  [phone]  
71y M PMH anxiety, depression, GERD, h/o colectomy, cervical spine fusion 2007, lumbar spine fusion 2008, spinal cord stimulator 2020 and revision 2021. Pt is now s/p lateral interbody fusion L2,3 with laminectomy; posterior L2-S1 fusion - closure by Plastic surgery with b/l paraspinal muscle flaps, and closure of side incisions 5/3.    Plan:  - Maintain HV and REZA drain in place  - Pain control prn  - LSO brace per Ortho    Plastic Surgery  [phone]
71y M PMH anxiety, depression, GERD, h/o colectomy, cervical spine fusion 2007, lumbar spine fusion 2008, spinal cord stimulator 2020 and revision 2021. Pt is now s/p lateral interbody fusion L2,3 with laminectomy; posterior L2-S1 fusion and closure by plastic surgery with b/l paraspinal muscle flaps and closure of side incisions 5/3.    Plan:  - Maintain HV and REZA drain in place, can likely remove one or both drains prior to discharge  - Prineo can be open to air  - Pain control prn  - appreciate care per primary    Plastic Surgery  [phone]  
A/P: 70 y/o M POD#7 s/p L2-L3 Lami/Ext LLIF  L3-S1      DVT ppx- Ambulation as tolerated, IPC  while in bed  Pain management prn  Home meds  Discharge to home today  D/W attending      MANASA Salazar  Orthopedic Surgery  [phone]  
S/P L2-3 Lami, LLIF L3-S1    Plan    f/u lumbar xrays & rib xrays  OOB/PT/WBAT  Discuss with attending re: new onset of radiculopathy  Monitor drain output       Neela Riggs PA-C   Beeper    [phone]  
S/P L2-L3 Lami; LLIF L3-S1    Plan    continue present mgt  OOB/PT/WBAT  Plastics monitoring drains  d/c planning in progress       Neela Riggs PA-C   Beeper    [phone]

## 2022-05-10 NOTE — DISCHARGE NOTE NURSING/CASE MANAGEMENT/SOCIAL WORK - NSDCPEFALRISK_GEN_ALL_CORE
For information on Fall & Injury Prevention, visit: [email or web address] OR  [email or web address] OR  https://www.cdc.gov/steadi/patient.html

## 2022-05-10 NOTE — PROGRESS NOTE ADULT - SUBJECTIVE AND OBJECTIVE BOX
Patient comfortable  No complaints    T(C): 36.8 (05-09-22 @ 20:47), Max: 36.8 (05-09-22 @ 09:03)  HR: 82 (05-09-22 @ 20:47) (69 - 82)  BP: 136/80 (05-09-22 @ 20:47) (124/69 - 136/80)  RR: 16 (05-09-22 @ 20:47) (16 - 18)  SpO2: 95% (05-09-22 @ 20:47) (94% - 96%)      PHYSICAL EXAM:  NAD, Alert and oriented X3  Back: Dressing C/D/I; dull sensation grossly intact to light touch; (+) Distal Pulses; No Calf tenderness B/L, PAS                    [x] Lower extremity                        PF          DF         EHL       FHL                                                                                            R        5/5        5/5        5/5       5/5                                                        L         5/5        5/5        5/5       5/5

## 2022-05-10 NOTE — PROGRESS NOTE ADULT - PROVIDER SPECIALTY LIST ADULT
Anesthesia
Anesthesia
Orthopedics
Plastic Surgery
Plastic Surgery
Orthopedics

## 2022-05-12 ENCOUNTER — APPOINTMENT (OUTPATIENT)
Dept: ORTHOPEDIC SURGERY | Facility: CLINIC | Age: 72
End: 2022-05-12

## 2022-06-03 ENCOUNTER — RX RENEWAL (OUTPATIENT)
Age: 72
End: 2022-06-03

## 2022-06-14 ENCOUNTER — APPOINTMENT (OUTPATIENT)
Dept: ORTHOPEDIC SURGERY | Facility: CLINIC | Age: 72
End: 2022-06-14
Payer: MEDICARE

## 2022-06-14 PROCEDURE — 99024 POSTOP FOLLOW-UP VISIT: CPT

## 2022-06-14 PROCEDURE — 72100 X-RAY EXAM L-S SPINE 2/3 VWS: CPT

## 2022-06-23 NOTE — PRE-OP CHECKLIST - WEIGHT IN KG
Detail Level: Detailed
Detail Level: Simple
Quality 224: Stage 0-Iic Melanoma: Overutilization Of Imaging Studies For Only Stage 0-Iic Melanoma: None of the following diagnostic imaging studies ordered: chest X-ray, CT, Ultrasound, MRI, PET, or nuclear medicine scans (ML)
Quality 137: Melanoma: Continuity Of Care - Recall System: Patient information entered into a recall system that includes: target date for the next exam specified AND a process to follow up with patients regarding missed or unscheduled appointments
76.2
Epidermal Sutures: 3-0 Ethilon

## 2022-07-26 ENCOUNTER — APPOINTMENT (OUTPATIENT)
Dept: ORTHOPEDIC SURGERY | Facility: CLINIC | Age: 72
End: 2022-07-26

## 2022-07-26 VITALS
DIASTOLIC BLOOD PRESSURE: 80 MMHG | HEART RATE: 84 BPM | SYSTOLIC BLOOD PRESSURE: 131 MMHG | BODY MASS INDEX: 25.9 KG/M2 | WEIGHT: 165 LBS | HEIGHT: 67 IN

## 2022-07-26 PROCEDURE — 99024 POSTOP FOLLOW-UP VISIT: CPT

## 2022-07-26 PROCEDURE — 72100 X-RAY EXAM L-S SPINE 2/3 VWS: CPT

## 2022-08-05 ENCOUNTER — APPOINTMENT (OUTPATIENT)
Dept: ORTHOPEDIC SURGERY | Facility: CLINIC | Age: 72
End: 2022-08-05

## 2022-08-05 PROCEDURE — 99442: CPT | Mod: 95

## 2022-09-24 LAB — SURGICAL PATHOLOGY STUDY: SIGNIFICANT CHANGE UP

## 2022-10-11 ENCOUNTER — APPOINTMENT (OUTPATIENT)
Dept: ORTHOPEDIC SURGERY | Facility: CLINIC | Age: 72
End: 2022-10-11

## 2023-06-06 NOTE — PROCEDURE: MOHS SURGERY
right upper arm Skin Substitute Text: The defect edges were debeveled with a #15c scalpel blade.  Given the location of the defect, shape of the defect and the proximity to free margins a skin substitute graft was deemed most appropriate.  The graft material was trimmed to fit the size of the defect. The graft was then placed in the primary defect and oriented appropriately.

## 2023-07-06 ENCOUNTER — APPOINTMENT (OUTPATIENT)
Dept: CT IMAGING | Facility: HOSPITAL | Age: 73
End: 2023-07-06

## 2023-07-09 ENCOUNTER — OUTPATIENT (OUTPATIENT)
Dept: OUTPATIENT SERVICES | Facility: HOSPITAL | Age: 73
LOS: 1 days | End: 2023-07-09
Payer: MEDICARE

## 2023-07-09 DIAGNOSIS — Z98.1 ARTHRODESIS STATUS: Chronic | ICD-10-CM

## 2023-07-09 DIAGNOSIS — Z98.49 CATARACT EXTRACTION STATUS, UNSPECIFIED EYE: Chronic | ICD-10-CM

## 2023-07-09 DIAGNOSIS — Z98.890 OTHER SPECIFIED POSTPROCEDURAL STATES: Chronic | ICD-10-CM

## 2023-07-09 DIAGNOSIS — E89.0 POSTPROCEDURAL HYPOTHYROIDISM: Chronic | ICD-10-CM

## 2023-07-09 DIAGNOSIS — Z90.49 ACQUIRED ABSENCE OF OTHER SPECIFIED PARTS OF DIGESTIVE TRACT: Chronic | ICD-10-CM

## 2023-07-09 DIAGNOSIS — Z90.79 ACQUIRED ABSENCE OF OTHER GENITAL ORGAN(S): Chronic | ICD-10-CM

## 2023-07-09 DIAGNOSIS — Z96.89 PRESENCE OF OTHER SPECIFIED FUNCTIONAL IMPLANTS: Chronic | ICD-10-CM

## 2023-07-09 DIAGNOSIS — E89.2 POSTPROCEDURAL HYPOPARATHYROIDISM: Chronic | ICD-10-CM

## 2023-07-09 LAB — POCT ISTAT CREATININE: 0.7 MG/DL — SIGNIFICANT CHANGE UP (ref 0.5–1.3)

## 2023-07-09 PROCEDURE — 70491 CT SOFT TISSUE NECK W/DYE: CPT | Mod: 26,MH

## 2023-07-09 PROCEDURE — 82565 ASSAY OF CREATININE: CPT

## 2023-07-09 PROCEDURE — 70491 CT SOFT TISSUE NECK W/DYE: CPT | Mod: MH

## 2023-07-27 ENCOUNTER — NON-APPOINTMENT (OUTPATIENT)
Age: 73
End: 2023-07-27

## 2023-08-21 ENCOUNTER — APPOINTMENT (OUTPATIENT)
Dept: OTHER | Facility: CLINIC | Age: 73
End: 2023-08-21
Payer: COMMERCIAL

## 2023-08-21 VITALS
SYSTOLIC BLOOD PRESSURE: 115 MMHG | DIASTOLIC BLOOD PRESSURE: 69 MMHG | HEART RATE: 63 BPM | RESPIRATION RATE: 16 BRPM | BODY MASS INDEX: 29.57 KG/M2 | TEMPERATURE: 98 F | WEIGHT: 184 LBS | HEIGHT: 66 IN | OXYGEN SATURATION: 95 %

## 2023-08-21 DIAGNOSIS — Z04.9 ENCOUNTER FOR EXAMINATION AND OBSERVATION FOR UNSPECIFIED REASON: ICD-10-CM

## 2023-08-21 PROCEDURE — 99387 INIT PM E/M NEW PAT 65+ YRS: CPT | Mod: 25

## 2023-08-21 RX ORDER — GABAPENTIN 600 MG/1
600 TABLET, COATED ORAL
Refills: 0 | Status: ACTIVE | COMMUNITY
Start: 2023-08-21

## 2023-08-21 RX ORDER — BUPROPION HYDROCHLORIDE 300 MG/1
300 TABLET, EXTENDED RELEASE ORAL
Refills: 0 | Status: ACTIVE | COMMUNITY
Start: 2023-08-21

## 2023-08-21 RX ORDER — TRAMADOL HYDROCHLORIDE 50 MG/1
50 TABLET, COATED ORAL
Qty: 90 | Refills: 0 | Status: DISCONTINUED | COMMUNITY
Start: 2022-05-26 | End: 2023-08-21

## 2023-08-21 RX ORDER — TIZANIDINE 2 MG/1
2 TABLET ORAL
Qty: 90 | Refills: 0 | Status: DISCONTINUED | COMMUNITY
Start: 2022-05-12 | End: 2023-08-21

## 2023-08-21 RX ORDER — DICYCLOMINE HYDROCHLORIDE 10 MG/1
10 CAPSULE ORAL
Refills: 0 | Status: ACTIVE | COMMUNITY
Start: 2023-08-21

## 2023-08-21 RX ORDER — OXYCODONE 5 MG/1
5 TABLET ORAL EVERY 6 HOURS
Qty: 120 | Refills: 0 | Status: DISCONTINUED | COMMUNITY
Start: 2022-05-26 | End: 2023-08-21

## 2023-08-21 RX ORDER — ESOMEPRAZOLE MAGNESIUM 20 MG/1
20 CAPSULE, DELAYED RELEASE ORAL
Refills: 0 | Status: ACTIVE | COMMUNITY
Start: 2023-08-21

## 2023-08-21 RX ORDER — TRAMADOL HYDROCHLORIDE 50 MG/1
50 TABLET, COATED ORAL 3 TIMES DAILY
Qty: 90 | Refills: 0 | Status: DISCONTINUED | COMMUNITY
Start: 2022-07-26 | End: 2023-08-21

## 2023-08-21 RX ORDER — QUETIAPINE 300 MG/1
300 TABLET, EXTENDED RELEASE ORAL
Refills: 0 | Status: ACTIVE | COMMUNITY
Start: 2023-08-21

## 2023-08-21 RX ORDER — PREDNISONE 10 MG/1
10 TABLET ORAL
Qty: 30 | Refills: 0 | Status: DISCONTINUED | COMMUNITY
Start: 2022-05-26 | End: 2023-08-21

## 2023-08-21 RX ORDER — SUCRALFATE 1 G/1
1 TABLET ORAL
Refills: 0 | Status: ACTIVE | COMMUNITY
Start: 2023-08-21

## 2023-08-21 RX ORDER — GABAPENTIN 300 MG/1
300 CAPSULE ORAL
Refills: 0 | Status: DISCONTINUED | COMMUNITY
Start: 2023-08-21 | End: 2023-08-21

## 2023-08-21 RX ORDER — TIZANIDINE 4 MG/1
4 TABLET ORAL 3 TIMES DAILY
Qty: 90 | Refills: 1 | Status: DISCONTINUED | COMMUNITY
Start: 2022-05-10 | End: 2023-08-21

## 2023-08-21 RX ORDER — TRAZODONE HYDROCHLORIDE 100 MG/1
100 TABLET ORAL
Refills: 0 | Status: ACTIVE | COMMUNITY
Start: 2023-08-21

## 2023-08-21 RX ORDER — MELOXICAM 15 MG/1
15 TABLET ORAL
Qty: 30 | Refills: 1 | Status: DISCONTINUED | COMMUNITY
Start: 2022-07-26 | End: 2023-08-21

## 2023-08-21 NOTE — DISCUSSION/SUMMARY
[Patient seen for WTC Monitoring ___] : Patient was seen for WTC monitoring [unfilled] [Please See Note in Chart and Documentation in Trial DB] : Please see note in chart and documentation in Trial DB. 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  [facility]  Tier 2 exposure  PMH/ PSH  Thyroid cancer 2006 thyroidectomy  neck fusion 2007 Posterior lumbar discectomy/laminectomy L2-3, extension of fusion L2-3 on 05/04/22 face SSC skin 2016 and 2020- had MOHS   GERD since 1990x  IBS   PE IN trial DB   ambulates with the case  Spiromerty: NOt done - declined due to recent fall wtih head injusty    CXR; DONE AT er [facility]  ACC: [id number]     EXAM:  CT NECK SOFT TISSUE IC   ORDERED BY: YAZAN ARCHIBALD  PROCEDURE DATE:  07/09/2023    INTERPRETATION:  Technique: A thin section axial acquisition was performed from the skull base to the thoracic inlet.  The data was reformatted in the sagittal, coronal and axial planes.  Contrast: 95 cc of Isovue-370.  Clinical Information: Right neck mass following total thyroidectomy for thyroid cancer.  Prior Studies: None available  Findings:  A marker was placed over the site of clinical concern, as indicated by the patient. This overlies the right sternocleidomastoid muscle at level 3. An underlying mass is not appreciated.  *  Aerodigestive Tract: Assessment is limited by streak artifact from dental and cervical fusion hardware. No mass is identified.  *  Lymph Nodes: There are 2 subcentimeter foci of coarse calcification in the left neck, at levels 3 and 4 (series 3/images 59 and 76). Given propensity for calcification in hector metastases of thyroid carcinoma, these are viewed with some suspicion. Correlation with prior imaging studies is recommended. No right cervical lymphadenopathy.  *  Salivary Glands: Normal  *  Thyroid Gland: Status post total thyroidectomy without suspicious soft tissue in the surgical bed  *  Orbits: Normal  *  Brain: Included portions are grossly unremarkable  *  Skull Base: Intact  *  Paranasal Sinuses: There is an expansile lucent lesion in the left anterior maxilla centered at the level of the lateral incisor and measuring approximately 2 x 2 by 1.5 cm. This appears predominantly cystic but is incompletely assessed on this neck examination. Paranasal sinuses are clear  *  Mastoids: Clear  *  Cervical Spine: Status post anterior fusion at C5/C6 with normal alignment.  *  Lung Apices: No large apical lung mass   IMPRESSION:  1.  No right neck mass is identified.  2.  There are 2 subcentimeter foci of coarse calcification in left levels 3 and 4 that are viewed with suspicion given history of thyroid cancer. Recommend correlation with prior imaging studies and possible histologic evaluation.  3.  Incidental expansile lucent lesion of the left anterior maxilla may reflect an odontogenic cyst but is incompletely assessed on this neck CT evaluation. Recommend correlation with dental examination and potentially with dedicated maxillary imaging.    [facility] pt with hx of thyroid CA and SCC skin   he will get records for certification

## 2023-08-22 LAB
ALBUMIN SERPL ELPH-MCNC: 4.5 G/DL
ALP BLD-CCNC: 68 U/L
ALT SERPL-CCNC: 34 U/L
ANION GAP SERPL CALC-SCNC: 10 MMOL/L
APPEARANCE: CLEAR
AST SERPL-CCNC: 26 U/L
BACTERIA: NEGATIVE /HPF
BILIRUB SERPL-MCNC: 0.3 MG/DL
BILIRUBIN URINE: NEGATIVE
BLOOD URINE: NEGATIVE
BUN SERPL-MCNC: 9 MG/DL
CALCIUM SERPL-MCNC: 9.2 MG/DL
CAST: 0 /LPF
CHLORIDE SERPL-SCNC: 103 MMOL/L
CHOLEST SERPL-MCNC: 219 MG/DL
CO2 SERPL-SCNC: 28 MMOL/L
COLOR: YELLOW
CREAT SERPL-MCNC: 0.86 MG/DL
EGFR: 91 ML/MIN/1.73M2
EPITHELIAL CELLS: 0 /HPF
GLUCOSE QUALITATIVE U: NEGATIVE MG/DL
GLUCOSE SERPL-MCNC: 98 MG/DL
HDLC SERPL-MCNC: 40 MG/DL
KETONES URINE: NEGATIVE MG/DL
LDLC SERPL CALC-MCNC: 140 MG/DL
LEUKOCYTE ESTERASE URINE: NEGATIVE
MICROSCOPIC-UA: NORMAL
NITRITE URINE: NEGATIVE
NONHDLC SERPL-MCNC: 178 MG/DL
PH URINE: 6.5
POTASSIUM SERPL-SCNC: 4.8 MMOL/L
PROT SERPL-MCNC: 7.2 G/DL
PROTEIN URINE: NEGATIVE MG/DL
RED BLOOD CELLS URINE: 0 /HPF
SODIUM SERPL-SCNC: 141 MMOL/L
SPECIFIC GRAVITY URINE: 1.01
TRIGL SERPL-MCNC: 210 MG/DL
UROBILINOGEN URINE: 0.2 MG/DL
WHITE BLOOD CELLS URINE: 0 /HPF

## 2023-09-20 ENCOUNTER — APPOINTMENT (OUTPATIENT)
Dept: OTHER | Facility: CLINIC | Age: 73
End: 2023-09-20
Payer: COMMERCIAL

## 2023-09-20 DIAGNOSIS — C73 MALIGNANT NEOPLASM OF THYROID GLAND: ICD-10-CM

## 2023-09-20 DIAGNOSIS — C44.321 SQUAMOUS CELL CARCINOMA OF SKIN OF NOSE: ICD-10-CM

## 2023-09-20 PROCEDURE — 99214 OFFICE O/P EST MOD 30 MIN: CPT | Mod: 95

## 2023-09-22 ENCOUNTER — NON-APPOINTMENT (OUTPATIENT)
Age: 73
End: 2023-09-22

## 2023-09-29 ENCOUNTER — NON-APPOINTMENT (OUTPATIENT)
Age: 73
End: 2023-09-29

## 2023-10-24 ENCOUNTER — NON-APPOINTMENT (OUTPATIENT)
Age: 73
End: 2023-10-24

## 2023-10-31 ENCOUNTER — NON-APPOINTMENT (OUTPATIENT)
Age: 73
End: 2023-10-31

## 2023-11-06 ENCOUNTER — APPOINTMENT (OUTPATIENT)
Dept: ORTHOPEDIC SURGERY | Facility: CLINIC | Age: 73
End: 2023-11-06
Payer: MEDICARE

## 2023-11-06 PROCEDURE — 99443: CPT | Mod: 95

## 2023-11-06 NOTE — PHYSICAL THERAPY INITIAL EVALUATION ADULT - DISCHARGE PLANNER MADE AWARE
PULMONARY FOLLOW-UP NOTE    Subjective    Ms. Campbell is a 69 year old female  who has HALLE    Chief Complaint:    No chief complaint on file.        HPI:  She just got a new cpap device.  Has known HALLE.          HISTORY:     Patient Active Problem List   Diagnosis   • Insomnia   • Hearing loss of left ear   • Tinnitus of left ear   • Obstructive sleep apnea (adult) (pediatric)   • Dense breasts   • TMJ (temporomandibular joint disorder)   • Loose stools   • Anxiety   • Lumbar disc disease   • Healthcare maintenance   • Hx of colonic polyp   • Diverticulosis   • Hemorrhoids   • Sinus bradycardia by electrocardiogram   • HLD (hyperlipidemia)       Past Medical History:   Diagnosis Date   • Colon polyps    • Diverticulosis    • Encounter for screening mammogram for malignant neoplasm of breast 09/10/2021   • GERD (gastroesophageal reflux disease)    • Hemorrhoids    • HLD (hyperlipidemia)    • Hx of colonic polyp    • IBS (irritable bowel syndrome)    • Insomnia    • Lumbar disc disease 2022   • Medicare annual wellness visit, subsequent 2020       Past Surgical History:   Procedure Laterality Date   •  section, low transverse     • Colonoscopy w biopsy  2022    diverticulosis, int hemorrhoids, hyperplastic polyp   • Lumbar discectomy         Family History   Problem Relation Age of Onset   • Stroke Mother    • COPD Father    • Dementia/Alzheimers Father    • Hypertension Maternal Grandmother        Social History     Tobacco Use   • Smoking status: Former     Current packs/day: 0.00     Average packs/day: 0.3 packs/day for 2.0 years (0.5 ttl pk-yrs)     Types: Cigarettes     Start date:      Quit date:      Years since quittin.8   • Smokeless tobacco: Never   • Tobacco comments:     very irregular smoker at college   Vaping Use   • Vaping Use: never used   Substance Use Topics   • Alcohol use: Yes     Comment: occasionally   • Drug use: No       MEDS:    Current Outpatient  Medications   Medication Sig Dispense Refill   • zolpidem (AMBIEN) 5 MG tablet Take 0.5 tablets by mouth nightly as needed for Sleep. Take 0.25 to 0.5 tab nightly as needed for sleep. 20 tablet 0   • Probiotic Product (PROBIOTIC DAILY PO)      • loperamide (IMODIUM) 2 MG capsule Take 2 mg by mouth 4 times daily as needed for Diarrhea.       No current facility-administered medications for this visit.       ALLERGY:    ALLERGIES:   Allergen Reactions   • Clarithromycin GI UPSET   • Levofloxacin Palpitations       REVIEW OF SYSTEMS:    Review of Systems   All other systems reviewed and are negative.          Examination    VITALS:  There were no vitals filed for this visit.     There is no height or weight on file to calculate BMI.      PHYSICAL EXAM F/U:    Physical Exam  Vitals and nursing note reviewed.   Constitutional:       Appearance: She is well-developed.   HENT:      Head: Normocephalic and atraumatic.   Cardiovascular:      Rate and Rhythm: Normal rate and regular rhythm.      Heart sounds: Normal heart sounds.   Pulmonary:      Effort: Pulmonary effort is normal. No respiratory distress.      Breath sounds: Normal breath sounds. No wheezing or rales.   Musculoskeletal:      Cervical back: Normal range of motion and neck supple.   Skin:     General: Skin is warm and dry.      Findings: No erythema.   Neurological:      Mental Status: She is alert and oriented to person, place, and time.   Psychiatric:         Mood and Affect: Mood normal.         Behavior: Behavior normal.         Thought Content: Thought content normal.         Judgment: Judgment normal.           PATIENT RECORDS:Reviewed.      PULMONARY PARAMETERS:  10/17/2016 PSG titration: titrated to cpap 7      [place] Score     [place] total score    --          LAB RESULTS:  WBC (K/mcL)   Date Value   10/19/2023 6.3     RBC (mil/mcL)   Date Value   10/19/2023 3.87 (L)     HCT (%)   Date Value   10/19/2023 37.1     HGB (g/dL)   Date Value    10/19/2023 12.4     PLT (K/mcL)   Date Value   10/19/2023 289       Sodium (mmol/L)   Date Value   10/19/2023 140     Potassium (mmol/L)   Date Value   10/19/2023 4.3     Chloride (mmol/L)   Date Value   10/19/2023 106     Glucose (mg/dL)   Date Value   10/19/2023 102 (H)     Calcium (mg/dL)   Date Value   10/19/2023 9.1     Carbon Dioxide (mmol/L)   Date Value   10/19/2023 29     BUN (mg/dL)   Date Value   10/19/2023 17     Creatinine (mg/dL)   Date Value   10/19/2023 0.58       No results found for: \"IRON\"  TSH (mcUnits/mL)   Date Value   09/12/2022 1.343           Assessment/Plan:  Problem List Items Addressed This Visit        Sleep    Insomnia    Obstructive sleep apnea (adult) (pediatric) - Primary       No follow-ups on file.    The patient indicated understanding of the diagnosis and agreed with the plan of care. All questions answered to the best of my ability.     Total time spent on today's encounter including pre chart review, time with patient counseling regarding above issues and documenting was 15 minutes.     Electronically signed by: Nadia Cuevas CNP 11/06/23   yes

## 2023-11-16 ENCOUNTER — NON-APPOINTMENT (OUTPATIENT)
Age: 73
End: 2023-11-16

## 2023-11-17 ENCOUNTER — NON-APPOINTMENT (OUTPATIENT)
Age: 73
End: 2023-11-17

## 2023-12-12 ENCOUNTER — APPOINTMENT (OUTPATIENT)
Dept: ORTHOPEDIC SURGERY | Facility: CLINIC | Age: 73
End: 2023-12-12
Payer: MEDICARE

## 2023-12-12 VITALS — BODY MASS INDEX: 29.57 KG/M2 | HEIGHT: 66 IN | WEIGHT: 184 LBS

## 2023-12-12 DIAGNOSIS — M43.16 SPONDYLOLISTHESIS, LUMBAR REGION: ICD-10-CM

## 2023-12-12 PROCEDURE — 99214 OFFICE O/P EST MOD 30 MIN: CPT

## 2023-12-12 PROCEDURE — 72100 X-RAY EXAM L-S SPINE 2/3 VWS: CPT

## 2023-12-12 RX ORDER — PREDNISONE 10 MG/1
10 TABLET ORAL
Qty: 30 | Refills: 0 | Status: ACTIVE | COMMUNITY
Start: 2023-12-12 | End: 1900-01-01

## 2023-12-19 ENCOUNTER — NON-APPOINTMENT (OUTPATIENT)
Age: 73
End: 2023-12-19

## 2023-12-21 RX ORDER — DICLOFENAC SODIUM 75 MG/1
75 TABLET, DELAYED RELEASE ORAL
Qty: 1 | Refills: 0 | Status: ACTIVE | COMMUNITY
Start: 2023-12-21 | End: 1900-01-01

## 2023-12-26 ENCOUNTER — RX RENEWAL (OUTPATIENT)
Age: 73
End: 2023-12-26

## 2023-12-26 RX ORDER — MELOXICAM 15 MG/1
15 TABLET ORAL
Qty: 30 | Refills: 1 | Status: ACTIVE | COMMUNITY
Start: 2023-11-06 | End: 1900-01-01

## 2024-01-05 ENCOUNTER — APPOINTMENT (OUTPATIENT)
Dept: MRI IMAGING | Facility: CLINIC | Age: 74
End: 2024-01-05
Payer: MEDICARE

## 2024-01-05 ENCOUNTER — OUTPATIENT (OUTPATIENT)
Dept: OUTPATIENT SERVICES | Facility: HOSPITAL | Age: 74
LOS: 1 days | End: 2024-01-05
Payer: MEDICARE

## 2024-01-05 DIAGNOSIS — Z98.890 OTHER SPECIFIED POSTPROCEDURAL STATES: Chronic | ICD-10-CM

## 2024-01-05 DIAGNOSIS — Z98.1 ARTHRODESIS STATUS: Chronic | ICD-10-CM

## 2024-01-05 DIAGNOSIS — Z96.89 PRESENCE OF OTHER SPECIFIED FUNCTIONAL IMPLANTS: Chronic | ICD-10-CM

## 2024-01-05 DIAGNOSIS — M43.16 SPONDYLOLISTHESIS, LUMBAR REGION: ICD-10-CM

## 2024-01-05 DIAGNOSIS — Z90.79 ACQUIRED ABSENCE OF OTHER GENITAL ORGAN(S): Chronic | ICD-10-CM

## 2024-01-05 DIAGNOSIS — E89.2 POSTPROCEDURAL HYPOPARATHYROIDISM: Chronic | ICD-10-CM

## 2024-01-05 DIAGNOSIS — Z98.49 CATARACT EXTRACTION STATUS, UNSPECIFIED EYE: Chronic | ICD-10-CM

## 2024-01-05 DIAGNOSIS — E89.0 POSTPROCEDURAL HYPOTHYROIDISM: Chronic | ICD-10-CM

## 2024-01-05 DIAGNOSIS — Z90.49 ACQUIRED ABSENCE OF OTHER SPECIFIED PARTS OF DIGESTIVE TRACT: Chronic | ICD-10-CM

## 2024-01-05 PROCEDURE — 72148 MRI LUMBAR SPINE W/O DYE: CPT | Mod: 26,MH

## 2024-01-05 PROCEDURE — 72148 MRI LUMBAR SPINE W/O DYE: CPT

## 2024-01-09 ENCOUNTER — APPOINTMENT (OUTPATIENT)
Dept: ORTHOPEDIC SURGERY | Facility: CLINIC | Age: 74
End: 2024-01-09
Payer: MEDICARE

## 2024-01-09 DIAGNOSIS — M48.07 SPINAL STENOSIS, LUMBOSACRAL REGION: ICD-10-CM

## 2024-01-09 DIAGNOSIS — M51.36 OTHER INTERVERTEBRAL DISC DEGENERATION, LUMBAR REGION: ICD-10-CM

## 2024-01-09 PROCEDURE — 99443: CPT | Mod: 93

## 2024-01-09 RX ORDER — GABAPENTIN 800 MG/1
800 TABLET, COATED ORAL
Qty: 90 | Refills: 1 | Status: ACTIVE | COMMUNITY
Start: 2024-01-09 | End: 1900-01-01

## 2024-01-24 ENCOUNTER — NON-APPOINTMENT (OUTPATIENT)
Age: 74
End: 2024-01-24

## 2024-03-12 NOTE — H&P PST ADULT - HEALTH CARE MAINTENANCE
Bed/Stretcher in lowest position, wheels locked, appropriate side rails in place/Call bell, personal items and telephone in reach/Instruct patient to call for assistance before getting out of bed/chair/stretcher/Non-slip footwear applied when patient is off stretcher/[place] to call system/Physically safe environment - no spills, clutter or unnecessary equipment/Purposeful proactive rounding/Room/bathroom lighting operational, light cord in reach
not current for flu vaccine

## 2024-03-15 ENCOUNTER — NON-APPOINTMENT (OUTPATIENT)
Age: 74
End: 2024-03-15

## 2024-03-16 RX ORDER — CYCLOBENZAPRINE HYDROCHLORIDE 5 MG/1
5 TABLET, FILM COATED ORAL
Qty: 90 | Refills: 1 | Status: ACTIVE | COMMUNITY
Start: 2024-03-16 | End: 1900-01-01

## 2024-04-04 ENCOUNTER — RX RENEWAL (OUTPATIENT)
Age: 74
End: 2024-04-04

## 2024-04-04 RX ORDER — DICLOFENAC SODIUM 75 MG/1
75 TABLET, DELAYED RELEASE ORAL
Qty: 60 | Refills: 1 | Status: ACTIVE | COMMUNITY
Start: 2024-01-12 | End: 1900-01-01

## 2024-04-30 ENCOUNTER — RX RENEWAL (OUTPATIENT)
Age: 74
End: 2024-04-30

## 2024-04-30 RX ORDER — ACETAMINOPHEN 650 MG/1
650 TABLET, EXTENDED RELEASE ORAL
Qty: 120 | Refills: 5 | Status: ACTIVE | COMMUNITY
Start: 2024-04-30 | End: 1900-01-01

## 2024-09-23 ENCOUNTER — APPOINTMENT (OUTPATIENT)
Dept: OTHER | Facility: CLINIC | Age: 74
End: 2024-09-23

## 2025-01-06 ENCOUNTER — NON-APPOINTMENT (OUTPATIENT)
Age: 75
End: 2025-01-06

## 2025-01-07 ENCOUNTER — RX RENEWAL (OUTPATIENT)
Age: 75
End: 2025-01-07

## 2025-02-27 ENCOUNTER — RX RENEWAL (OUTPATIENT)
Age: 75
End: 2025-02-27

## 2025-04-16 ENCOUNTER — APPOINTMENT (OUTPATIENT)
Dept: ORTHOPEDIC SURGERY | Facility: CLINIC | Age: 75
End: 2025-04-16

## 2025-04-19 ENCOUNTER — NON-APPOINTMENT (OUTPATIENT)
Age: 75
End: 2025-04-19

## 2025-04-21 ENCOUNTER — APPOINTMENT (OUTPATIENT)
Dept: OTHER | Facility: CLINIC | Age: 75
End: 2025-04-21

## 2025-04-21 DIAGNOSIS — C73 MALIGNANT NEOPLASM OF THYROID GLAND: ICD-10-CM

## 2025-04-21 DIAGNOSIS — C44.321 SQUAMOUS CELL CARCINOMA OF SKIN OF NOSE: ICD-10-CM

## 2025-04-21 DIAGNOSIS — C44.91 BASAL CELL CARCINOMA OF SKIN, UNSPECIFIED: ICD-10-CM

## 2025-04-21 DIAGNOSIS — Z04.9 ENCOUNTER FOR EXAMINATION AND OBSERVATION FOR UNSPECIFIED REASON: ICD-10-CM

## 2025-04-21 PROCEDURE — 99214 OFFICE O/P EST MOD 30 MIN: CPT | Mod: 93,25

## 2025-04-21 PROCEDURE — 99397 PER PM REEVAL EST PAT 65+ YR: CPT | Mod: 93

## 2025-04-21 NOTE — DISCUSSION/SUMMARY
[Patient seen for WTC Monitoring ___] : Patient was seen for WTC monitoring [unfilled] [Please See Note in Chart and Documentation in Trial DB] : Please see note in chart and documentation in Trial DB. 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 -Influenza Vaccine reported up to date -RTC 1 year

## 2025-04-21 NOTE — REVIEW OF SYSTEMS
[Fever] : no fever [Chills] : no chills [Eye Pain] : no eye pain [Red Eyes] : eyes not red [Nosebleeds] : no nosebleeds [Nasal Discharge] : no nasal discharge [Chest Pain] : no chest pain [Palpitations] : no palpitations [Shortness Of Breath] : no shortness of breath [Cough] : no cough [Wheezing] : no wheezing [SOB on Exertion] : shortness of breath during exertion [Heartburn] : no heartburn [Joint Pain] : joint pain [Skin Lesions] : skin lesion

## 2025-04-21 NOTE — PAST MEDICAL HISTORY
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

## 2025-04-21 NOTE — PAST MEDICAL HISTORY
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

## 2025-04-21 NOTE — REASON FOR VISIT
[Follow-Up] : a follow-up visit [Initial Eval - Existing Diagnosis] : an initial evaluation of an existing diagnosis

## 2025-04-21 NOTE — HISTORY OF PRESENT ILLNESS
[Home] : at home, [unfilled] , at the time of the visit. [Medical Office: ([facility])___] : at the medical office located in  [Telehealth (audio & video)] : This visit was provided via telehealth using real-time 2-way audio visual technology. 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

## 2025-04-21 NOTE — HISTORY OF PRESENT ILLNESS
[Home] : at home, [unfilled] , at the time of the visit. [Medical Office: ([facility])___] : at the medical office located in  [Telehealth (audio & video)] : This visit was provided via telehealth using real-time 2-way audio visual technology. 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

## 2025-04-21 NOTE — ASSESSMENT
[FreeTextEntry1] :   patient wiht Hx of  Thyroid cancer and underwent thyroidectomy.   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

## 2025-04-21 NOTE — DISCUSSION/SUMMARY
[Patient seen for WTC Monitoring ___] : Patient was seen for WTC monitoring [unfilled] [Please See Note in Chart and Documentation in Trial DB] : Please see note in chart and documentation in Trial DB. 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 -Influenza Vaccine reported up to date -RTC 1 year

## 2025-04-22 ENCOUNTER — NON-APPOINTMENT (OUTPATIENT)
Age: 75
End: 2025-04-22

## 2025-04-22 ENCOUNTER — APPOINTMENT (OUTPATIENT)
Dept: ORTHOPEDIC SURGERY | Facility: CLINIC | Age: 75
End: 2025-04-22
Payer: MEDICARE

## 2025-04-22 VITALS — BODY MASS INDEX: 29.57 KG/M2 | HEIGHT: 66 IN | WEIGHT: 184 LBS

## 2025-04-22 DIAGNOSIS — T84.84XA PAIN DUE TO INTERNAL ORTHOPEDIC PROSTHETIC DEVICES, IMPLANTS AND GRAFTS, INITIAL ENCOUNTER: ICD-10-CM

## 2025-04-22 DIAGNOSIS — M43.16 SPONDYLOLISTHESIS, LUMBAR REGION: ICD-10-CM

## 2025-04-22 DIAGNOSIS — M51.369: ICD-10-CM

## 2025-04-22 DIAGNOSIS — G56.02 CARPAL TUNNEL SYNDROME, LEFT UPPER LIMB: ICD-10-CM

## 2025-04-22 DIAGNOSIS — Z98.1 ARTHRODESIS STATUS: ICD-10-CM

## 2025-04-22 DIAGNOSIS — M54.2 CERVICALGIA: ICD-10-CM

## 2025-04-22 PROCEDURE — 72100 X-RAY EXAM L-S SPINE 2/3 VWS: CPT

## 2025-04-22 PROCEDURE — 99214 OFFICE O/P EST MOD 30 MIN: CPT

## 2025-04-22 PROCEDURE — 72070 X-RAY EXAM THORAC SPINE 2VWS: CPT

## 2025-04-29 ENCOUNTER — APPOINTMENT (OUTPATIENT)
Dept: ORTHOPEDIC SURGERY | Facility: CLINIC | Age: 75
End: 2025-04-29
Payer: MEDICARE

## 2025-04-29 DIAGNOSIS — Z98.1 ARTHRODESIS STATUS: ICD-10-CM

## 2025-04-29 PROCEDURE — 99214 OFFICE O/P EST MOD 30 MIN: CPT | Mod: 93

## 2025-04-30 ENCOUNTER — NON-APPOINTMENT (OUTPATIENT)
Age: 75
End: 2025-04-30

## 2025-05-02 ENCOUNTER — OUTPATIENT (OUTPATIENT)
Dept: OUTPATIENT SERVICES | Facility: HOSPITAL | Age: 75
LOS: 1 days | End: 2025-05-02
Payer: MEDICARE

## 2025-05-02 VITALS
RESPIRATION RATE: 20 BRPM | TEMPERATURE: 98 F | HEIGHT: 66 IN | HEART RATE: 63 BPM | DIASTOLIC BLOOD PRESSURE: 83 MMHG | SYSTOLIC BLOOD PRESSURE: 134 MMHG | OXYGEN SATURATION: 96 % | WEIGHT: 175.93 LBS

## 2025-05-02 DIAGNOSIS — Z98.890 OTHER SPECIFIED POSTPROCEDURAL STATES: Chronic | ICD-10-CM

## 2025-05-02 DIAGNOSIS — E89.0 POSTPROCEDURAL HYPOTHYROIDISM: Chronic | ICD-10-CM

## 2025-05-02 DIAGNOSIS — Z96.89 PRESENCE OF OTHER SPECIFIED FUNCTIONAL IMPLANTS: Chronic | ICD-10-CM

## 2025-05-02 DIAGNOSIS — Z98.1 ARTHRODESIS STATUS: Chronic | ICD-10-CM

## 2025-05-02 DIAGNOSIS — Z98.1 ARTHRODESIS STATUS: ICD-10-CM

## 2025-05-02 DIAGNOSIS — Z90.79 ACQUIRED ABSENCE OF OTHER GENITAL ORGAN(S): Chronic | ICD-10-CM

## 2025-05-02 DIAGNOSIS — Z90.49 ACQUIRED ABSENCE OF OTHER SPECIFIED PARTS OF DIGESTIVE TRACT: Chronic | ICD-10-CM

## 2025-05-02 DIAGNOSIS — E89.2 POSTPROCEDURAL HYPOPARATHYROIDISM: Chronic | ICD-10-CM

## 2025-05-02 DIAGNOSIS — Z98.49 CATARACT EXTRACTION STATUS, UNSPECIFIED EYE: Chronic | ICD-10-CM

## 2025-05-02 LAB
A1C WITH ESTIMATED AVERAGE GLUCOSE RESULT: 5.8 % — HIGH (ref 4–5.6)
ANION GAP SERPL CALC-SCNC: 14 MMOL/L — SIGNIFICANT CHANGE UP (ref 5–17)
BLD GP AB SCN SERPL QL: NEGATIVE — SIGNIFICANT CHANGE UP
BUN SERPL-MCNC: 11 MG/DL — SIGNIFICANT CHANGE UP (ref 7–23)
CALCIUM SERPL-MCNC: 8.9 MG/DL — SIGNIFICANT CHANGE UP (ref 8.4–10.5)
CHLORIDE SERPL-SCNC: 104 MMOL/L — SIGNIFICANT CHANGE UP (ref 96–108)
CO2 SERPL-SCNC: 22 MMOL/L — SIGNIFICANT CHANGE UP (ref 22–31)
CREAT SERPL-MCNC: 0.82 MG/DL — SIGNIFICANT CHANGE UP (ref 0.5–1.3)
EGFR: 92 ML/MIN/1.73M2 — SIGNIFICANT CHANGE UP
EGFR: 92 ML/MIN/1.73M2 — SIGNIFICANT CHANGE UP
ESTIMATED AVERAGE GLUCOSE: 120 MG/DL — HIGH (ref 68–114)
GLUCOSE SERPL-MCNC: 95 MG/DL — SIGNIFICANT CHANGE UP (ref 70–99)
HCT VFR BLD CALC: 42.4 % — SIGNIFICANT CHANGE UP (ref 39–50)
HGB BLD-MCNC: 13.7 G/DL — SIGNIFICANT CHANGE UP (ref 13–17)
MCHC RBC-ENTMCNC: 26.8 PG — LOW (ref 27–34)
MCHC RBC-ENTMCNC: 32.3 G/DL — SIGNIFICANT CHANGE UP (ref 32–36)
MCV RBC AUTO: 83 FL — SIGNIFICANT CHANGE UP (ref 80–100)
NRBC BLD AUTO-RTO: 0 /100 WBCS — SIGNIFICANT CHANGE UP (ref 0–0)
PLATELET # BLD AUTO: 213 K/UL — SIGNIFICANT CHANGE UP (ref 150–400)
POTASSIUM SERPL-MCNC: 4.3 MMOL/L — SIGNIFICANT CHANGE UP (ref 3.5–5.3)
POTASSIUM SERPL-SCNC: 4.3 MMOL/L — SIGNIFICANT CHANGE UP (ref 3.5–5.3)
RBC # BLD: 5.11 M/UL — SIGNIFICANT CHANGE UP (ref 4.2–5.8)
RBC # FLD: 13.9 % — SIGNIFICANT CHANGE UP (ref 10.3–14.5)
RH IG SCN BLD-IMP: POSITIVE — SIGNIFICANT CHANGE UP
SODIUM SERPL-SCNC: 140 MMOL/L — SIGNIFICANT CHANGE UP (ref 135–145)
WBC # BLD: 5.93 K/UL — SIGNIFICANT CHANGE UP (ref 3.8–10.5)
WBC # FLD AUTO: 5.93 K/UL — SIGNIFICANT CHANGE UP (ref 3.8–10.5)

## 2025-05-02 PROCEDURE — 86850 RBC ANTIBODY SCREEN: CPT

## 2025-05-02 PROCEDURE — 80048 BASIC METABOLIC PNL TOTAL CA: CPT

## 2025-05-02 PROCEDURE — 83036 HEMOGLOBIN GLYCOSYLATED A1C: CPT

## 2025-05-02 PROCEDURE — G0463: CPT

## 2025-05-02 PROCEDURE — 86901 BLOOD TYPING SEROLOGIC RH(D): CPT

## 2025-05-02 PROCEDURE — 87640 STAPH A DNA AMP PROBE: CPT

## 2025-05-02 PROCEDURE — 86900 BLOOD TYPING SEROLOGIC ABO: CPT

## 2025-05-02 PROCEDURE — 85027 COMPLETE CBC AUTOMATED: CPT

## 2025-05-02 PROCEDURE — 87641 MR-STAPH DNA AMP PROBE: CPT

## 2025-05-02 RX ORDER — ACETAMINOPHEN 500 MG/5ML
1000 LIQUID (ML) ORAL ONCE
Refills: 0 | Status: COMPLETED | OUTPATIENT
Start: 2025-05-14 | End: 2025-05-14

## 2025-05-02 RX ORDER — LIDOCAINE HCL/PF 10 MG/ML
0.2 VIAL (ML) INJECTION ONCE
Refills: 0 | Status: DISCONTINUED | OUTPATIENT
Start: 2025-05-14 | End: 2025-05-14

## 2025-05-02 RX ORDER — APREPITANT 40 MG/1
40 CAPSULE ORAL ONCE
Refills: 0 | Status: COMPLETED | OUTPATIENT
Start: 2025-05-14 | End: 2025-05-14

## 2025-05-02 NOTE — H&P PST ADULT - ASSESSMENT
DASI score: 7  DASI activity: active walking, adl's  Loose teeth or denture:  upper partial  DASI score: 7  DASI activity: active walking, adl's  Loose teeth or denture:  upper partial   CAPRINI SCORE    AGE RELATED RISK FACTORS                                                             [ ] Age 41-60 years                                            (1 Point)  [ ] Age: 61-74 years                                           (2 Points)                 [x ] Age= 75 years                                                (3 Points)             DISEASE RELATED RISK FACTORS                                                       [ ] Edema in the lower extremities                 (1 Point)                     [ ] Varicose veins                                               (1 Point)                                 [x ] BMI > 25 Kg/m2                                            (1 Point)                                  [ ] Serious infection (ie PNA)                            (1 Point)                     [ ] Lung disease ( COPD, Emphysema)            (1 Point)                                                                          [ ] Acute myocardial infarction                         (1 Point)                  [ ] Congestive heart failure (in the previous month)  (1 Point)         [ ] Inflammatory bowel disease                            (1 Point)                  [ ] Central venous access, PICC or Port               (2 points)       (within the last month)                                                                [ ] Stroke (in the previous month)                        (5 Points)    [ ] Previous or present malignancy                       (2 points)                                                                                                                                                         HEMATOLOGY RELATED FACTORS                                                         [ ] Prior episodes of VTE                                     (3 Points)                     [ ] Positive family history for VTE                      (3 Points)                  [ ] Prothrombin 29652 A                                     (3 Points)                     [ ] Factor V Leiden                                                (3 Points)                        [ ] Lupus anticoagulants                                      (3 Points)                                                           [ ] Anticardiolipin antibodies                              (3 Points)                                                       [ ] High homocysteine in the blood                   (3 Points)                                             [ ] Other congenital or acquired thrombophilia      (3 Points)                                                [ ] Heparin induced thrombocytopenia                  (3 Points)                                        MOBILITY RELATED FACTORS  [ ] Bed rest                                                         (1 Point)  [ ] Plaster cast                                                    (2 points)  [ ] Bed bound for more than 72 hours           (2 Points)    GENDER SPECIFIC FACTORS  [ ] Pregnancy or had a baby within the last month   (1 Point)  [ ] Post-partum < 6 weeks                                   (1 Point)  [ ] Hormonal therapy  or oral contraception   (1 Point)  [ ] History of pregnancy complications              (1 point)  [ ] Unexplained or recurrent              (1 Point)    OTHER RISK FACTORS                                           (1 Point)  [ ] BMI >40, smoking, diabetes requiring insulin, chemotherapy  blood transfusions and length of surgery over 2 hours    SURGERY RELATED RISK FACTORS  [ ]  Section within the last month     (1 Point)  [ ] Minor surgery                                                  (1 Point)  [ ] Arthroscopic surgery                                       (2 Points)  [x ] Planned major surgery lasting more            (2 Points)      than 45 minutes     [ ] Elective hip or knee joint replacement       (5 points)       surgery                                                TRAUMA RELATED RISK FACTORS  [ ] Fracture of the hip, pelvis, or leg                       (5 Points)  [ ] Spinal cord injury resulting in paralysis             (5 points)       (in the previous month)    [ ] Paralysis  (less than 1 month)                             (5 Points)  [ ] Multiple Trauma within 1 month                        (5 Points)    Total Score [   6     ]    Caprini Score 0-2: Low Risk, NO VTE prophylaxis required for most patients, encourage ambulation  Caprini Score 3-6: Moderate Risk , pharmacologic VTE prophylaxis is indicated for most patients (in the absence of contraindications)  Caprini Score Greater than or =7: High risk, pharmocologic VTE prophylaxis indicated for most patients (in the absence of contraindications)

## 2025-05-02 NOTE — H&P PST ADULT - ATTENDING COMMENTS
73 yo male s/p revision laminectomy extension of spinal fusion at L23 and LLIF L23 from L3-S1 approximately 2 years ago, now Pseudarthrosis L5S1, with screw migration of Left L5, with foraminal stenos bilaterally, failed PT, NSAIDS, EDI, with inabilty to walk 5 feet, with cane, walker, has axial back pain with L>R radiculopathy, numbness and tingling.  Weakness of  TA, EHL, Peroneals 3/5 Left, 4/5 right.  Reduced L5 sensation L>R.  I reviewed surgical diagnosis and surgical treatment plan, which would be revision laminectomy with interbody at L5S1, with revision Posterior Spinal Fusion L2-Pelvis.  I reviewed all major risks, benefits, and complications with surgical intervention.

## 2025-05-02 NOTE — H&P PST ADULT - HISTORY OF PRESENT ILLNESS
75 yo male with Hypothyroidism, squamous cell CA, anxiety, chronic back pain, with spinal stenosis, s/p cervical laminectomy and fusion, and lumbar laminectomy last in 2013. Now with pain radiating to LLE and now using cane.  c/o difficulty walking.

## 2025-05-02 NOTE — H&P PST ADULT - NSICDXPASTMEDICALHX_GEN_ALL_CORE_FT
PAST MEDICAL HISTORY:  Anxiety and depression     BPH (benign prostatic hyperplasia)     GERD (gastroesophageal reflux disease)     HLD (hyperlipidemia)     Peyronie's disease     SCC (squamous cell carcinoma) of nose    Stomach ulcer      PAST MEDICAL HISTORY:  Anxiety and depression     BPH (benign prostatic hyperplasia)     GERD (gastroesophageal reflux disease)     Herpes zoster     History of chronic back pain     History of spinal stenosis     HLD (hyperlipidemia)     Peyronie's disease     SCC (squamous cell carcinoma) of nose    Stomach ulcer

## 2025-05-03 LAB
MRSA PCR RESULT.: SIGNIFICANT CHANGE UP
S AUREUS DNA NOSE QL NAA+PROBE: SIGNIFICANT CHANGE UP

## 2025-05-08 PROBLEM — Z87.39 PERSONAL HISTORY OF OTHER DISEASES OF THE MUSCULOSKELETAL SYSTEM AND CONNECTIVE TISSUE: Chronic | Status: ACTIVE | Noted: 2025-05-02

## 2025-05-08 PROBLEM — B02.9 ZOSTER WITHOUT COMPLICATIONS: Chronic | Status: ACTIVE | Noted: 2025-05-02

## 2025-05-08 PROBLEM — K25.9 GASTRIC ULCER, UNSPECIFIED AS ACUTE OR CHRONIC, WITHOUT HEMORRHAGE OR PERFORATION: Chronic | Status: ACTIVE | Noted: 2025-05-02

## 2025-05-09 ENCOUNTER — APPOINTMENT (OUTPATIENT)
Dept: ORTHOPEDIC SURGERY | Facility: CLINIC | Age: 75
End: 2025-05-09
Payer: MEDICARE

## 2025-05-09 VITALS — HEIGHT: 66 IN | WEIGHT: 184 LBS | BODY MASS INDEX: 29.57 KG/M2

## 2025-05-09 DIAGNOSIS — M47.10 OTHER SPONDYLOSIS WITH MYELOPATHY, SITE UNSPECIFIED: ICD-10-CM

## 2025-05-09 DIAGNOSIS — M54.12 RADICULOPATHY, CERVICAL REGION: ICD-10-CM

## 2025-05-09 PROBLEM — Z87.39 PERSONAL HISTORY OF OTHER DISEASES OF THE MUSCULOSKELETAL SYSTEM AND CONNECTIVE TISSUE: Chronic | Status: ACTIVE | Noted: 2025-05-02

## 2025-05-09 PROCEDURE — 99214 OFFICE O/P EST MOD 30 MIN: CPT

## 2025-05-10 ENCOUNTER — APPOINTMENT (OUTPATIENT)
Dept: CT IMAGING | Facility: HOSPITAL | Age: 75
End: 2025-05-10
Payer: MEDICARE

## 2025-05-10 ENCOUNTER — OUTPATIENT (OUTPATIENT)
Dept: OUTPATIENT SERVICES | Facility: HOSPITAL | Age: 75
LOS: 1 days | End: 2025-05-10
Payer: MEDICARE

## 2025-05-10 ENCOUNTER — APPOINTMENT (OUTPATIENT)
Dept: MRI IMAGING | Facility: HOSPITAL | Age: 75
End: 2025-05-10
Payer: MEDICARE

## 2025-05-10 DIAGNOSIS — E89.2 POSTPROCEDURAL HYPOPARATHYROIDISM: Chronic | ICD-10-CM

## 2025-05-10 DIAGNOSIS — M48.07 SPINAL STENOSIS, LUMBOSACRAL REGION: ICD-10-CM

## 2025-05-10 DIAGNOSIS — Z98.890 OTHER SPECIFIED POSTPROCEDURAL STATES: Chronic | ICD-10-CM

## 2025-05-10 PROCEDURE — 72141 MRI NECK SPINE W/O DYE: CPT

## 2025-05-10 PROCEDURE — 72131 CT LUMBAR SPINE W/O DYE: CPT | Mod: 26

## 2025-05-10 PROCEDURE — 72141 MRI NECK SPINE W/O DYE: CPT | Mod: 26

## 2025-05-10 PROCEDURE — 72131 CT LUMBAR SPINE W/O DYE: CPT

## 2025-05-12 ENCOUNTER — APPOINTMENT (OUTPATIENT)
Dept: ORTHOPEDIC SURGERY | Facility: CLINIC | Age: 75
End: 2025-05-12
Payer: MEDICARE

## 2025-05-12 DIAGNOSIS — M51.369: ICD-10-CM

## 2025-05-12 DIAGNOSIS — M96.0 PSEUDARTHROSIS AFTER FUSION OR ARTHRODESIS: ICD-10-CM

## 2025-05-12 DIAGNOSIS — M48.07 SPINAL STENOSIS, LUMBOSACRAL REGION: ICD-10-CM

## 2025-05-12 PROCEDURE — 99213 OFFICE O/P EST LOW 20 MIN: CPT | Mod: 93

## 2025-05-14 ENCOUNTER — TRANSCRIPTION ENCOUNTER (OUTPATIENT)
Age: 75
End: 2025-05-14

## 2025-05-14 ENCOUNTER — RESULT REVIEW (OUTPATIENT)
Age: 75
End: 2025-05-14

## 2025-05-14 ENCOUNTER — INPATIENT (INPATIENT)
Facility: HOSPITAL | Age: 75
LOS: 5 days | Discharge: HOME HEALTH | DRG: 448 | End: 2025-05-20
Attending: ORTHOPAEDIC SURGERY | Admitting: ORTHOPAEDIC SURGERY
Payer: MEDICARE

## 2025-05-14 ENCOUNTER — APPOINTMENT (OUTPATIENT)
Dept: ORTHOPEDIC SURGERY | Facility: HOSPITAL | Age: 75
End: 2025-05-14

## 2025-05-14 VITALS
DIASTOLIC BLOOD PRESSURE: 85 MMHG | OXYGEN SATURATION: 94 % | HEART RATE: 72 BPM | HEIGHT: 66 IN | SYSTOLIC BLOOD PRESSURE: 135 MMHG | RESPIRATION RATE: 16 BRPM | WEIGHT: 175.93 LBS | TEMPERATURE: 98 F

## 2025-05-14 DIAGNOSIS — Z98.890 OTHER SPECIFIED POSTPROCEDURAL STATES: Chronic | ICD-10-CM

## 2025-05-14 DIAGNOSIS — Z98.49 CATARACT EXTRACTION STATUS, UNSPECIFIED EYE: Chronic | ICD-10-CM

## 2025-05-14 DIAGNOSIS — Z98.1 ARTHRODESIS STATUS: ICD-10-CM

## 2025-05-14 DIAGNOSIS — Z98.1 ARTHRODESIS STATUS: Chronic | ICD-10-CM

## 2025-05-14 DIAGNOSIS — E89.2 POSTPROCEDURAL HYPOPARATHYROIDISM: Chronic | ICD-10-CM

## 2025-05-14 DIAGNOSIS — Z90.79 ACQUIRED ABSENCE OF OTHER GENITAL ORGAN(S): Chronic | ICD-10-CM

## 2025-05-14 DIAGNOSIS — Z90.49 ACQUIRED ABSENCE OF OTHER SPECIFIED PARTS OF DIGESTIVE TRACT: Chronic | ICD-10-CM

## 2025-05-14 DIAGNOSIS — E89.0 POSTPROCEDURAL HYPOTHYROIDISM: Chronic | ICD-10-CM

## 2025-05-14 DIAGNOSIS — Z96.89 PRESENCE OF OTHER SPECIFIED FUNCTIONAL IMPLANTS: Chronic | ICD-10-CM

## 2025-05-14 LAB
GAS PNL BLDA: SIGNIFICANT CHANGE UP
GLUCOSE BLDC GLUCOMTR-MCNC: 96 MG/DL — SIGNIFICANT CHANGE UP (ref 70–99)

## 2025-05-14 PROCEDURE — 61783 SCAN PROC SPINAL: CPT | Mod: 22

## 2025-05-14 PROCEDURE — 15734 MUSCLE-SKIN GRAFT TRUNK: CPT | Mod: 22

## 2025-05-14 PROCEDURE — 22842 INSERT SPINE FIXATION DEVICE: CPT | Mod: 22

## 2025-05-14 PROCEDURE — 20937 SP BONE AGRFT MORSEL ADD-ON: CPT

## 2025-05-14 PROCEDURE — 22214 INCIS 1 VERTEBRAL SEG LUMBAR: CPT | Mod: 22

## 2025-05-14 PROCEDURE — 27279 ARTHRD SI JT PLMT TARTCLR DV: CPT | Mod: 50

## 2025-05-14 PROCEDURE — 88311 DECALCIFY TISSUE: CPT | Mod: 26

## 2025-05-14 PROCEDURE — 22848 INSERT PELV FIXATION DEVICE: CPT | Mod: 22

## 2025-05-14 PROCEDURE — 63042 LAMINOTOMY SINGLE LUMBAR: CPT | Mod: 22

## 2025-05-14 PROCEDURE — 88304 TISSUE EXAM BY PATHOLOGIST: CPT | Mod: 26

## 2025-05-14 PROCEDURE — 22614 ARTHRD PST TQ 1NTRSPC EA ADD: CPT | Mod: 22

## 2025-05-14 PROCEDURE — 22612 ARTHRD PST TQ 1NTRSPC LUMBAR: CPT | Mod: 22

## 2025-05-14 DEVICE — SCREW BS CNCMAS 8.5X90MM: Type: IMPLANTABLE DEVICE | Status: FUNCTIONAL

## 2025-05-14 DEVICE — GRAFT I-FACTOR PUTTY 5CC: Type: IMPLANTABLE DEVICE | Status: FUNCTIONAL

## 2025-05-14 DEVICE — IMPLANTABLE DEVICE: Type: IMPLANTABLE DEVICE | Status: FUNCTIONAL

## 2025-05-14 DEVICE — SURGIFOAM 8 X 12.5CM X 10MM (100): Type: IMPLANTABLE DEVICE | Status: FUNCTIONAL

## 2025-05-14 DEVICE — SURGIFLO MATRIX WITH THROMBIN KIT: Type: IMPLANTABLE DEVICE | Status: FUNCTIONAL

## 2025-05-14 DEVICE — SET SCREW CONN 5.5: Type: IMPLANTABLE DEVICE | Status: FUNCTIONAL

## 2025-05-14 DEVICE — ROD 35MM: Type: IMPLANTABLE DEVICE | Status: FUNCTIONAL

## 2025-05-14 DEVICE — KIT A-LINE 1LUM 20G X 12CM SAFE KIT: Type: IMPLANTABLE DEVICE | Status: FUNCTIONAL

## 2025-05-14 DEVICE — BONE FILLER BIOCOMPOSITE STIMULAN RAPID CURE 10CC: Type: IMPLANTABLE DEVICE | Status: FUNCTIONAL

## 2025-05-14 DEVICE — SCREW SET: Type: IMPLANTABLE DEVICE | Status: FUNCTIONAL

## 2025-05-14 DEVICE — SCREW SOLERA 7.5X45X5.5: Type: IMPLANTABLE DEVICE | Status: FUNCTIONAL

## 2025-05-14 RX ORDER — CEFAZOLIN SODIUM IN 0.9 % NACL 3 G/100 ML
2000 INTRAVENOUS SOLUTION, PIGGYBACK (ML) INTRAVENOUS ONCE
Refills: 0 | Status: COMPLETED | OUTPATIENT
Start: 2025-05-14 | End: 2025-05-14

## 2025-05-14 RX ORDER — ONDANSETRON HCL/PF 4 MG/2 ML
4 VIAL (ML) INJECTION ONCE
Refills: 0 | Status: DISCONTINUED | OUTPATIENT
Start: 2025-05-14 | End: 2025-05-15

## 2025-05-14 RX ORDER — POLYETHYLENE GLYCOL 3350 17 G/17G
17 POWDER, FOR SOLUTION ORAL AT BEDTIME
Refills: 0 | Status: DISCONTINUED | OUTPATIENT
Start: 2025-05-14 | End: 2025-05-20

## 2025-05-14 RX ORDER — OXYCODONE HYDROCHLORIDE 30 MG/1
5 TABLET ORAL ONCE
Refills: 0 | Status: DISCONTINUED | OUTPATIENT
Start: 2025-05-14 | End: 2025-05-15

## 2025-05-14 RX ORDER — DEXAMETHASONE 0.5 MG/1
5 TABLET ORAL EVERY 6 HOURS
Refills: 0 | Status: COMPLETED | OUTPATIENT
Start: 2025-05-15 | End: 2025-05-15

## 2025-05-14 RX ORDER — INSULIN LISPRO 100 U/ML
INJECTION, SOLUTION INTRAVENOUS; SUBCUTANEOUS AT BEDTIME
Refills: 0 | Status: DISCONTINUED | OUTPATIENT
Start: 2025-05-15 | End: 2025-05-15

## 2025-05-14 RX ORDER — INSULIN LISPRO 100 U/ML
INJECTION, SOLUTION INTRAVENOUS; SUBCUTANEOUS
Refills: 0 | Status: DISCONTINUED | OUTPATIENT
Start: 2025-05-14 | End: 2025-05-15

## 2025-05-14 RX ORDER — DEXTROSE 50 % IN WATER 50 %
25 SYRINGE (ML) INTRAVENOUS ONCE
Refills: 0 | Status: DISCONTINUED | OUTPATIENT
Start: 2025-05-14 | End: 2025-05-15

## 2025-05-14 RX ORDER — DEXTROSE 50 % IN WATER 50 %
12.5 SYRINGE (ML) INTRAVENOUS ONCE
Refills: 0 | Status: DISCONTINUED | OUTPATIENT
Start: 2025-05-14 | End: 2025-05-15

## 2025-05-14 RX ORDER — ACETAMINOPHEN 500 MG/5ML
650 LIQUID (ML) ORAL EVERY 6 HOURS
Refills: 0 | Status: DISCONTINUED | OUTPATIENT
Start: 2025-05-14 | End: 2025-05-20

## 2025-05-14 RX ORDER — SENNA 187 MG
2 TABLET ORAL AT BEDTIME
Refills: 0 | Status: DISCONTINUED | OUTPATIENT
Start: 2025-05-14 | End: 2025-05-20

## 2025-05-14 RX ORDER — SODIUM CHLORIDE 9 G/1000ML
1000 INJECTION, SOLUTION INTRAVENOUS
Refills: 0 | Status: DISCONTINUED | OUTPATIENT
Start: 2025-05-14 | End: 2025-05-15

## 2025-05-14 RX ORDER — QUETIAPINE FUMARATE 25 MG/1
300 TABLET ORAL DAILY
Refills: 0 | Status: DISCONTINUED | OUTPATIENT
Start: 2025-05-15 | End: 2025-05-20

## 2025-05-14 RX ORDER — TRAZODONE HCL 100 MG
100 TABLET ORAL AT BEDTIME
Refills: 0 | Status: DISCONTINUED | OUTPATIENT
Start: 2025-05-14 | End: 2025-05-20

## 2025-05-14 RX ORDER — CEFAZOLIN SODIUM IN 0.9 % NACL 3 G/100 ML
2000 INTRAVENOUS SOLUTION, PIGGYBACK (ML) INTRAVENOUS EVERY 8 HOURS
Refills: 0 | Status: COMPLETED | OUTPATIENT
Start: 2025-05-15 | End: 2025-05-15

## 2025-05-14 RX ORDER — TRAMADOL HYDROCHLORIDE 50 MG/1
25 TABLET, FILM COATED ORAL EVERY 6 HOURS
Refills: 0 | Status: DISCONTINUED | OUTPATIENT
Start: 2025-05-14 | End: 2025-05-20

## 2025-05-14 RX ORDER — ALPRAZOLAM 0.5 MG
1 TABLET, EXTENDED RELEASE 24 HR ORAL EVERY 6 HOURS
Refills: 0 | Status: DISCONTINUED | OUTPATIENT
Start: 2025-05-14 | End: 2025-05-20

## 2025-05-14 RX ORDER — OXYCODONE HYDROCHLORIDE 30 MG/1
2.5 TABLET ORAL EVERY 4 HOURS
Refills: 0 | Status: DISCONTINUED | OUTPATIENT
Start: 2025-05-14 | End: 2025-05-17

## 2025-05-14 RX ORDER — GABAPENTIN 400 MG/1
600 CAPSULE ORAL THREE TIMES A DAY
Refills: 0 | Status: DISCONTINUED | OUTPATIENT
Start: 2025-05-14 | End: 2025-05-18

## 2025-05-14 RX ORDER — GLUCAGON 3 MG/1
1 POWDER NASAL ONCE
Refills: 0 | Status: DISCONTINUED | OUTPATIENT
Start: 2025-05-14 | End: 2025-05-15

## 2025-05-14 RX ORDER — HYDROMORPHONE/SOD CHLOR,ISO/PF 2 MG/10 ML
0.5 SYRINGE (ML) INJECTION
Refills: 0 | Status: DISCONTINUED | OUTPATIENT
Start: 2025-05-14 | End: 2025-05-15

## 2025-05-14 RX ORDER — LEVOTHYROXINE SODIUM 300 MCG
112 TABLET ORAL DAILY
Refills: 0 | Status: DISCONTINUED | OUTPATIENT
Start: 2025-05-14 | End: 2025-05-20

## 2025-05-14 RX ORDER — OXYCODONE HYDROCHLORIDE 30 MG/1
5 TABLET ORAL EVERY 4 HOURS
Refills: 0 | Status: DISCONTINUED | OUTPATIENT
Start: 2025-05-14 | End: 2025-05-17

## 2025-05-14 RX ORDER — ONDANSETRON HCL/PF 4 MG/2 ML
4 VIAL (ML) INJECTION EVERY 6 HOURS
Refills: 0 | Status: DISCONTINUED | OUTPATIENT
Start: 2025-05-14 | End: 2025-05-20

## 2025-05-14 RX ORDER — DEXTROSE 50 % IN WATER 50 %
15 SYRINGE (ML) INTRAVENOUS ONCE
Refills: 0 | Status: DISCONTINUED | OUTPATIENT
Start: 2025-05-14 | End: 2025-05-15

## 2025-05-14 RX ORDER — BUPROPION HYDROBROMIDE 522 MG/1
300 TABLET, EXTENDED RELEASE ORAL DAILY
Refills: 0 | Status: DISCONTINUED | OUTPATIENT
Start: 2025-05-14 | End: 2025-05-20

## 2025-05-14 RX ADMIN — Medication 1000 MILLIGRAM(S): at 11:11

## 2025-05-14 RX ADMIN — Medication 0.5 MILLIGRAM(S): at 23:40

## 2025-05-14 RX ADMIN — APREPITANT 40 MILLIGRAM(S): 40 CAPSULE ORAL at 11:12

## 2025-05-14 NOTE — BRIEF OPERATIVE NOTE - NSICDXBRIEFPROCEDURE_GEN_ALL_CORE_FT
PROCEDURES:  Revision, fusion, spine, lumbar 14-May-2025 22:43:09 Revision L2-S1 posterior spinal fusion with extension to pelvis, bilateral L5/S1 laminectomies Hernan Lynn

## 2025-05-14 NOTE — PATIENT PROFILE ADULT - FALL HARM RISK - RISK INTERVENTIONS

## 2025-05-14 NOTE — CHART NOTE - NSCHARTNOTEFT_GEN_A_CORE
CAPRINI SCORE [CLOT]    AGE RELATED RISK FACTORS                                                       MOBILITY RELATED FACTORS  [ ] Age 41-60 years                                            (1 Point)                  [ ] Bed rest                                                        (1 Point)  [ ] Age: 61-74 years                                           (2 Points)                 [ ] Plaster cast                                                   (2 Points)  [x] Age= 75 years                                              (3 Points)                 [ ] Bed bound for more than 72 hours                 (2 Points)    DISEASE RELATED RISK FACTORS                                               GENDER SPECIFIC FACTORS  [ ] Edema in the lower extremities                       (1 Point)                  [ ] Pregnancy                                                     (1 Point)  [ ] Varicose veins                                               (1 Point)                  [ ] Post-partum < 6 weeks                                   (1 Point)             [x] BMI > 25 Kg/m2                                            (1 Point)                  [ ] Hormonal therapy  or oral contraception          (1 Point)                 [ ] Sepsis (in the previous month)                        (1 Point)                  [ ] History of pregnancy complications                 (1 point)  [ ] Pneumonia or serious lung disease                                               [ ] Unexplained or recurrent                     (1 Point)           (in the previous month)                               (1 Point)  [ ] Abnormal pulmonary function test                     (1 Point)                 SURGERY RELATED RISK FACTORS  [ ] Acute myocardial infarction                              (1 Point)                 [ ]  Section                                             (1 Point)  [ ] Congestive heart failure (in the previous month)  (1 Point)               [ ] Minor surgery                                                  (1 Point)   [ ] Inflammatory bowel disease                             (1 Point)                 [ ] Arthroscopic surgery                                        (2 Points)  [ ] Central venous access                                      (2 Points)               [x] General surgery lasting more than 45 minutes   (2 Points)       [ ] Stroke (in the previous month)                          (5 Points)               [ ] Elective arthroplasty                                         (5 Points)                                                                                                                                               HEMATOLOGY RELATED FACTORS                                                 TRAUMA RELATED RISK FACTORS  [ ] Prior episodes of VTE                                     (3 Points)                [ ] Fracture of the hip, pelvis, or leg                       (5 Points)  [ ] Positive family history for VTE                         (3 Points)                 [ ] Acute spinal cord injury (in the previous month)  (5 Points)  [ ] Prothrombin 97008 A                                     (3 Points)                 [ ] Paralysis  (less than 1 month)                             (5 Points)  [ ] Factor V Leiden                                             (3 Points)                  [ ] Multiple Trauma within 1 month                        (5 Points)  [ ] Lupus anticoagulants                                     (3 Points)                                                           [ ] Anticardiolipin antibodies                               (3 Points)                                                       [ ] High homocysteine in the blood                      (3 Points)                                             [ ] Other congenital or acquired thrombophilia      (3 Points)                                                [ ] Heparin induced thrombocytopenia                  (3 Points)                                          Total Score [     6     ]    Caprini Score 0 - 2:  Low Risk, No VTE Prophylaxis required for most patients, encourage ambulation  Caprini Score 3 - 6:  At Risk, pharmacologic VTE prophylaxis is indicated for most patients (in the absence of a contraindication)  Caprini Score Greater than or = 7:  High Risk, pharmacologic VTE prophylaxis is indicated for most patients (in the absence of a contraindication)    Janessa William PA-C  Team Pager #[phone]

## 2025-05-14 NOTE — CHART NOTE - NSCHARTNOTEFT_GEN_A_CORE
Patient seen in PACU resting with complaint of lower back pain.  No Chest Pain, SOB, N/V.  Pre op s/sx :  BLE radic ; Post op, patient reports: lower back pain  .    T(C): 36.2 (05-14-25 @ 22:50), Max: 36.5 (05-14-25 @ 09:58)  HR: 81 (05-14-25 @ 23:45) (72 - 89)  BP: 107/60 (05-14-25 @ 23:45) (93/57 - 135/85)  RR: 16 (05-14-25 @ 23:45) (14 - 16)  SpO2: 97% (05-14-25 @ 23:45) (94% - 99%)  Wt(kg): --  Exam:   Alert/Oriented, No Acute Distress           Dressing: [x] clean/dry/intact  [ ] Other:           Drains: : HV in place maintaining good suction         Sensation: [x] intact to light touch  [ ] decreased:          Motor exam: (pain limited, will continue to reassess)          [ ] Lower extremity                     PF          DF         EHL       FHL                                                                                            R        5/5        3/5        3/5       5/5                                                        L         5/5        3/5        3/5       5/5                                                              Calves Soft/Non-tender bilaterally         [x] warm well perfused; capillary refill <3 seconds                A/P :  74y Male s/p revision L2-S1 PSF    -    Pain control  -    Taper  -    DVT ppx: SCDs       -    Physical Therapy: [facility]  -    Followup AM labs  -    PACU to floor    Janessa William PA-C  Pager # [phone]

## 2025-05-15 LAB
ANION GAP SERPL CALC-SCNC: 14 MMOL/L — SIGNIFICANT CHANGE UP (ref 5–17)
BASOPHILS # BLD AUTO: 0.01 K/UL — SIGNIFICANT CHANGE UP (ref 0–0.2)
BASOPHILS NFR BLD AUTO: 0.1 % — SIGNIFICANT CHANGE UP (ref 0–2)
BUN SERPL-MCNC: 10 MG/DL — SIGNIFICANT CHANGE UP (ref 7–23)
CALCIUM SERPL-MCNC: 7.9 MG/DL — LOW (ref 8.4–10.5)
CHLORIDE SERPL-SCNC: 106 MMOL/L — SIGNIFICANT CHANGE UP (ref 96–108)
CO2 SERPL-SCNC: 20 MMOL/L — LOW (ref 22–31)
CREAT SERPL-MCNC: 0.79 MG/DL — SIGNIFICANT CHANGE UP (ref 0.5–1.3)
EGFR: 93 ML/MIN/1.73M2 — SIGNIFICANT CHANGE UP
EGFR: 93 ML/MIN/1.73M2 — SIGNIFICANT CHANGE UP
EOSINOPHIL # BLD AUTO: 0 K/UL — SIGNIFICANT CHANGE UP (ref 0–0.5)
EOSINOPHIL NFR BLD AUTO: 0 % — SIGNIFICANT CHANGE UP (ref 0–6)
GLUCOSE BLDC GLUCOMTR-MCNC: 169 MG/DL — HIGH (ref 70–99)
GLUCOSE SERPL-MCNC: 149 MG/DL — HIGH (ref 70–99)
GRAM STN FLD: SIGNIFICANT CHANGE UP
HCT VFR BLD CALC: 33.6 % — LOW (ref 39–50)
HGB BLD-MCNC: 10.9 G/DL — LOW (ref 13–17)
IMM GRANULOCYTES NFR BLD AUTO: 0.8 % — SIGNIFICANT CHANGE UP (ref 0–0.9)
LYMPHOCYTES # BLD AUTO: 1.07 K/UL — SIGNIFICANT CHANGE UP (ref 1–3.3)
LYMPHOCYTES # BLD AUTO: 8.1 % — LOW (ref 13–44)
MCHC RBC-ENTMCNC: 26.9 PG — LOW (ref 27–34)
MCHC RBC-ENTMCNC: 32.4 G/DL — SIGNIFICANT CHANGE UP (ref 32–36)
MCV RBC AUTO: 83 FL — SIGNIFICANT CHANGE UP (ref 80–100)
MONOCYTES # BLD AUTO: 0.45 K/UL — SIGNIFICANT CHANGE UP (ref 0–0.9)
MONOCYTES NFR BLD AUTO: 3.4 % — SIGNIFICANT CHANGE UP (ref 2–14)
NEUTROPHILS # BLD AUTO: 11.57 K/UL — HIGH (ref 1.8–7.4)
NEUTROPHILS NFR BLD AUTO: 87.6 % — HIGH (ref 43–77)
NIGHT BLUE STAIN TISS: SIGNIFICANT CHANGE UP
NRBC BLD AUTO-RTO: 0 /100 WBCS — SIGNIFICANT CHANGE UP (ref 0–0)
PLATELET # BLD AUTO: 177 K/UL — SIGNIFICANT CHANGE UP (ref 150–400)
POTASSIUM SERPL-MCNC: 4.8 MMOL/L — SIGNIFICANT CHANGE UP (ref 3.5–5.3)
POTASSIUM SERPL-SCNC: 4.8 MMOL/L — SIGNIFICANT CHANGE UP (ref 3.5–5.3)
RBC # BLD: 4.05 M/UL — LOW (ref 4.2–5.8)
RBC # FLD: 14.3 % — SIGNIFICANT CHANGE UP (ref 10.3–14.5)
SODIUM SERPL-SCNC: 140 MMOL/L — SIGNIFICANT CHANGE UP (ref 135–145)
SPECIMEN SOURCE: SIGNIFICANT CHANGE UP
WBC # BLD: 13.21 K/UL — HIGH (ref 3.8–10.5)
WBC # FLD AUTO: 13.21 K/UL — HIGH (ref 3.8–10.5)

## 2025-05-15 PROCEDURE — 93970 EXTREMITY STUDY: CPT | Mod: 26

## 2025-05-15 RX ORDER — PREDNISONE 20 MG/1
30 TABLET ORAL DAILY
Refills: 0 | Status: DISCONTINUED | OUTPATIENT
Start: 2025-05-19 | End: 2025-05-20

## 2025-05-15 RX ORDER — PREDNISONE 20 MG/1
TABLET ORAL
Refills: 0 | Status: DISCONTINUED | OUTPATIENT
Start: 2025-05-16 | End: 2025-05-20

## 2025-05-15 RX ORDER — PREDNISONE 20 MG/1
20 TABLET ORAL DAILY
Refills: 0 | Status: CANCELLED | OUTPATIENT
Start: 2025-05-22 | End: 2025-05-20

## 2025-05-15 RX ORDER — CEFAZOLIN SODIUM IN 0.9 % NACL 3 G/100 ML
2000 INTRAVENOUS SOLUTION, PIGGYBACK (ML) INTRAVENOUS EVERY 8 HOURS
Refills: 0 | Status: DISCONTINUED | OUTPATIENT
Start: 2025-05-15 | End: 2025-05-20

## 2025-05-15 RX ORDER — CYCLOBENZAPRINE HYDROCHLORIDE 15 MG/1
5 CAPSULE, EXTENDED RELEASE ORAL THREE TIMES A DAY
Refills: 0 | Status: DISCONTINUED | OUTPATIENT
Start: 2025-05-15 | End: 2025-05-20

## 2025-05-15 RX ORDER — PREDNISONE 20 MG/1
40 TABLET ORAL DAILY
Refills: 0 | Status: COMPLETED | OUTPATIENT
Start: 2025-05-16 | End: 2025-05-18

## 2025-05-15 RX ORDER — ENOXAPARIN SODIUM 100 MG/ML
40 INJECTION SUBCUTANEOUS EVERY 24 HOURS
Refills: 0 | Status: DISCONTINUED | OUTPATIENT
Start: 2025-05-16 | End: 2025-05-20

## 2025-05-15 RX ORDER — PREDNISONE 20 MG/1
10 TABLET ORAL DAILY
Refills: 0 | Status: CANCELLED | OUTPATIENT
Start: 2025-05-25 | End: 2025-05-20

## 2025-05-15 RX ADMIN — GABAPENTIN 600 MILLIGRAM(S): 400 CAPSULE ORAL at 05:43

## 2025-05-15 RX ADMIN — Medication 650 MILLIGRAM(S): at 04:56

## 2025-05-15 RX ADMIN — GABAPENTIN 600 MILLIGRAM(S): 400 CAPSULE ORAL at 21:24

## 2025-05-15 RX ADMIN — POLYETHYLENE GLYCOL 3350 17 GRAM(S): 17 POWDER, FOR SOLUTION ORAL at 21:24

## 2025-05-15 RX ADMIN — Medication 10 MILLIGRAM(S): at 15:04

## 2025-05-15 RX ADMIN — Medication 4 MILLIGRAM(S): at 20:01

## 2025-05-15 RX ADMIN — Medication 650 MILLIGRAM(S): at 21:24

## 2025-05-15 RX ADMIN — OXYCODONE HYDROCHLORIDE 5 MILLIGRAM(S): 30 TABLET ORAL at 16:07

## 2025-05-15 RX ADMIN — OXYCODONE HYDROCHLORIDE 5 MILLIGRAM(S): 30 TABLET ORAL at 15:07

## 2025-05-15 RX ADMIN — Medication 112 MICROGRAM(S): at 05:43

## 2025-05-15 RX ADMIN — Medication 100 MILLIGRAM(S): at 21:28

## 2025-05-15 RX ADMIN — Medication 0.5 MILLIGRAM(S): at 00:15

## 2025-05-15 RX ADMIN — GABAPENTIN 600 MILLIGRAM(S): 400 CAPSULE ORAL at 13:29

## 2025-05-15 RX ADMIN — Medication 10 MILLIGRAM(S): at 11:37

## 2025-05-15 RX ADMIN — Medication 2 TABLET(S): at 21:24

## 2025-05-15 RX ADMIN — OXYCODONE HYDROCHLORIDE 5 MILLIGRAM(S): 30 TABLET ORAL at 10:56

## 2025-05-15 RX ADMIN — DEXAMETHASONE 5 MILLIGRAM(S): 0.5 TABLET ORAL at 03:56

## 2025-05-15 RX ADMIN — Medication 650 MILLIGRAM(S): at 03:56

## 2025-05-15 RX ADMIN — QUETIAPINE FUMARATE 300 MILLIGRAM(S): 25 TABLET ORAL at 21:25

## 2025-05-15 RX ADMIN — Medication 10 MILLIGRAM(S): at 21:26

## 2025-05-15 RX ADMIN — Medication 500 MILLIGRAM(S): at 11:36

## 2025-05-15 RX ADMIN — BUPROPION HYDROBROMIDE 300 MILLIGRAM(S): 522 TABLET, EXTENDED RELEASE ORAL at 11:36

## 2025-05-15 RX ADMIN — Medication 650 MILLIGRAM(S): at 09:40

## 2025-05-15 RX ADMIN — DEXAMETHASONE 5 MILLIGRAM(S): 0.5 TABLET ORAL at 15:04

## 2025-05-15 RX ADMIN — Medication 100 MILLIGRAM(S): at 13:24

## 2025-05-15 RX ADMIN — Medication 10 MILLIGRAM(S): at 05:44

## 2025-05-15 RX ADMIN — Medication 100 MILLIGRAM(S): at 05:44

## 2025-05-15 RX ADMIN — DEXAMETHASONE 5 MILLIGRAM(S): 0.5 TABLET ORAL at 21:25

## 2025-05-15 RX ADMIN — Medication 650 MILLIGRAM(S): at 22:24

## 2025-05-15 RX ADMIN — DEXAMETHASONE 5 MILLIGRAM(S): 0.5 TABLET ORAL at 09:41

## 2025-05-15 RX ADMIN — Medication 1 MILLIGRAM(S): at 13:28

## 2025-05-15 RX ADMIN — Medication 100 MILLIGRAM(S): at 21:23

## 2025-05-15 RX ADMIN — Medication 40 MILLIGRAM(S): at 05:43

## 2025-05-15 NOTE — OCCUPATIONAL THERAPY INITIAL EVALUATION ADULT - PERTINENT HX OF CURRENT PROBLEM, REHAB EVAL
75 yo male with Hypothyroidism, squamous cell CA, anxiety, chronic back pain, with spinal stenosis, s/p cervical laminectomy and fusion, and lumbar laminectomy last in 2013. Now with pain radiating to LLE and now using cane.  c/o difficulty walking.   CT Lumbar Spine: IMPRESSION: Status post decompression and instrumented fusion from L2 to S1 with pedicle screws and interconnecting rods. Lucency about the bilateral S1 screws. Solid osseous fusion across the posterior elements spanning L2-L5. Moderate left and moderate to severe right foraminal narrowing at L5/S1.  Now s/p Revision L2-S1 PSF with extension to pelvis and bilateral L5/S1 laminectomies on (5/14/25).

## 2025-05-15 NOTE — PHYSICAL THERAPY INITIAL EVALUATION ADULT - LIVES WITH, PROFILE
Pt resides with spouse in apartment with elevator access and no steps to negotiate. Pt reports being functionally independent in all aspects of mobility and self care prior to admission. Pt owns RW however, notes ambulating with cane prior to admission./spouse

## 2025-05-15 NOTE — CHART NOTE - NSCHARTNOTEFT_GEN_A_CORE
Patient seen at bedside with Dr. Acuña.  Patient admits overall symptoms w/ pain and radiculopathy to LLE are improved since surgery.  Patient strength overall improved.  Will continue to monitor patient.    Exam:      Motor:                                    L2 (IP)            L3 (Quad)            L4 (TA)               L5 (EHL)            S1(Gas)                                R           5/5                      5/5                 5/5                    5/5                    5/5                                     L           4*/5                      4*/5                5/5                  5/5                    5/5                     *pain limited*                   Sensory:                                      L2          L3            L4      L5       S1         (0=absent, 1=impaired, 2=normal, NT=not testable)                             R        1            1            1        1         1                               L         1            1           1        1         1      Jesse Colorado PA-C  Orthopedic Surgery  Pager #[phone] Patient seen at bedside with Dr. Acuña.  Patient admits overall symptoms w/ pain and radiculopathy to LLE are improved since surgery.  Patient strength overall improved.  Will continue to monitor patient.    Exam:      Motor:                                    L2 (IP)            L3 (Quad)            L4 (TA)               L5 (EHL)            S1(Gas)                                R           5/5                      5/5                 5/5                    5/5                    5/5                                     L           4*/5                      4*/5                5/5                  5/5                    5/5                     *pain limited*                   Sensory:                                      L2          L3            L4      L5       S1         (0=absent, 1=impaired, 2=normal, NT=not testable)                             R        2            2             2        2         2                               L         2            2            2        2         2      Jesse Colorado PA-C  Orthopedic Surgery  Pager #[phone]

## 2025-05-15 NOTE — PROGRESS NOTE ADULT - SUBJECTIVE AND OBJECTIVE BOX
Dr Acuña - Spine Post Op Note  -------------------------------------------------------------------     s/p ***(name of surgery, specific to levels) on (date) POD # _    PRE-OP SYMPTOMS AND DIAGNOSIS: 75 yo male s/p revision laminectomy extension of spinal fusion at L23 and LLIF L23 from L3-S1 approximately 2 years ago, now Pseudarthrosis L5S1, with screw migration of Left L5, with foraminal stenos bilaterally, failed PT, NSAIDS, EDI, with inabilty to walk 5 feet, with cane, walker, has axial back pain with L>R radiculopathy, numbness and tingling.  Weakness of  TA, EHL, Peroneals 3/5 Left, 4/5 right.  Reduced L5 sensation L>R.  I reviewed surgical diagnosis and surgical treatment plan, which would be revision laminectomy with interbody at L5S1, with revision Posterior Spinal Fusion L2-Pelvis.  I reviewed all major risks, benefits, and complications with surgical intervention.    POST-OP SYMPTOMS: Patient tolerated the procedure well. Patient seen and examined at bedside. No acute complaints at this time. Experiencing some soreness but pain overall controlled with medications. Had some nausea earlier which he said has subsided, no emesis. Denies new or worsening weakness, numbness or tingling compared to baseline preoperatively. Denies chest pain, shortness of breath, nausea or vomiting. Patient notes bilateral lower extremity sensory deficits throughout L2-S1 but states this is the same as his preoperative examination. Patient continues to have weakness bilaterally of TA, EHL and peroneals L>R.       Medical issues/events Overnight: Stable and doing well in PACU, patient with history of Herpes Zoster (on Valtrex), HLD, Spinal stenosis, SCC     PHYSICAL EXAM  General: NAD, resting comforatbly in bed  SPINE:  Dressing C/D/I  1 HMV drain in place with SS output  2+ radial pulses  2+ DP Pulses    Motor:                   C5                C6              C7               C8           T1   R             5/5                5/5            5/5              5/5          5/5  L             5/5                5/5            5/5              5/5          5/5                    L2                  L3             L4              L5            S1  R            5/5                5/5             4/5            3/5          4*/5  L            4*/5               4*/5            4/5            4/5          4*/5  *Pain limiting    Sensory:            C5         C6         C7      C8       T1        (0=absent, 1=impaired, 2=normal, NT=not testable)  R         2            2           2        2         2  L          2            1           1        2         2               L2          L3         L4      L5       S1         (0=absent, 1=impaired, 2=normal, NT=not testable)  R         1         1           1       1           1  L          1           1          1       1        1  **upper and lower extremity sensory deficits unchanged from preoperative per patient     VITAL SIGNS   T(C): 36.2 (05-15-25 @ 00:30), Max: 36.5 (05-14-25 @ 09:58)  HR: 80 (05-15-25 @ 00:30) (72 - 89)  BP: 112/59 (05-15-25 @ 00:30) (93/57 - 135/85)  RR: 14 (05-15-25 @ 00:30) (14 - 16)  SpO2: 97% (05-15-25 @ 00:30) (94% - 100%)    05-14-25 @ 07:01  -  05-15-25 @ 01:18  --------------------------------------------------------  IN: 0 mL / OUT: 415 mL / NET: -415 mL        LABS: FU AM LABS  - follow up intraoperative cultures  - 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     NEW IMAGING AND RESULTS:    PT/OT:  -Last PT/OT session date: N/A  -Progress: (steps, sat in chair, OOB): N/A  -Dispo recommendations: pending initial evaluation     CONSULTANTS/RECOMMENDATIONS:    A/P:  74y m s/p revision L2-S1 posterior spinal fusion with extension to the pelvis and bilateral L5/S1 laminectomies  -PT/OT   -WBAT BLLE  -Plan to DC merino in AM once patient ambulating  -Please document hemovac drain outputs every shift  -Continue ancef while drain in place, will discontinue upon removal of drain when output appropriate  -FU OR pathology, FU OR cultures (received)  -Pain Control as needed  -DVT ppx to start on POD2 with Lovenox 40mg, patient will be discharged on Xarelto  -FU AM Labs  -Incentive Spirometry  -Medical management appreciated  -Discussed with Dr Acuña who agrees with plan     Dr Acuña - Spine Post Op Note  -------------------------------------------------------------------     s/p Revision L2-S1 PSF with extension to pelvis and bilateral L5/S1 laminectomies on (5/14/25) POD #1    PRE-OP SYMPTOMS AND DIAGNOSIS: 75 yo male s/p revision laminectomy extension of spinal fusion at L23 and LLIF L23 from L3-S1 approximately 2 years ago, now Pseudarthrosis L5S1, with screw migration of Left L5, with foraminal stenos bilaterally, failed PT, NSAIDS, EDI, with inabilty to walk 5 feet, with cane, walker, has axial back pain with L>R radiculopathy, numbness and tingling.  Weakness of  TA, EHL, Peroneals 3/5 Left, 4/5 right.  Reduced L5 sensation L>R.  I reviewed surgical diagnosis and surgical treatment plan, which would be revision laminectomy with interbody at L5S1, with revision Posterior Spinal Fusion L2-Pelvis.  I reviewed all major risks, benefits, and complications with surgical intervention.    POST-OP SYMPTOMS: Patient tolerated the procedure well. Patient seen and examined at bedside. No acute complaints at this time. Experiencing some soreness but pain overall controlled with medications. Had some nausea earlier which he said has subsided, no emesis. Denies new or worsening weakness, numbness or tingling compared to baseline preoperatively. Denies chest pain, shortness of breath, nausea or vomiting. Patient notes bilateral lower extremity sensory deficits throughout L2-S1 but states this is the same as his preoperative examination. Patient continues to have weakness bilaterally of TA, EHL and peroneals L>R.       Medical issues/events Overnight: Stable and doing well in PACU, patient with history of Herpes Zoster (on Valtrex), HLD, Spinal stenosis, SCC     PHYSICAL EXAM  General: NAD, resting comforatbly in bed  SPINE:  Dressing C/D/I  1 HMV drain in place with SS output  2+ radial pulses  2+ DP Pulses    Motor:                   C5                C6              C7               C8           T1   R             5/5                5/5            5/5              5/5          5/5  L             5/5                5/5            5/5              5/5          5/5                    L2                  L3             L4              L5            S1  R            5/5                5/5             4/5            3/5          4*/5  L            4*/5               4*/5            4/5            4/5          4*/5  *Pain limiting    Sensory:            C5         C6         C7      C8       T1        (0=absent, 1=impaired, 2=normal, NT=not testable)  R         2            2           2        2         2  L          2            1           1        2         2               L2          L3         L4      L5       S1         (0=absent, 1=impaired, 2=normal, NT=not testable)  R         1         1           1       1           1  L          1           1          1       1        1  **upper and lower extremity sensory deficits unchanged from preoperative per patient     VITAL SIGNS   T(C): 36.2 (05-15-25 @ 00:30), Max: 36.5 (05-14-25 @ 09:58)  HR: 80 (05-15-25 @ 00:30) (72 - 89)  BP: 112/59 (05-15-25 @ 00:30) (93/57 - 135/85)  RR: 14 (05-15-25 @ 00:30) (14 - 16)  SpO2: 97% (05-15-25 @ 00:30) (94% - 100%)    05-14-25 @ 07:01  -  05-15-25 @ 01:18  --------------------------------------------------------  IN: 0 mL / OUT: 415 mL / NET: -415 mL        LABS: FU AM LABS  - follow up intraoperative cultures  - 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     NEW IMAGING AND RESULTS:    PT/OT:  -Last PT/OT session date: N/A  -Progress: (steps, sat in chair, OOB): N/A  -Dispo recommendations: pending initial evaluation     CONSULTANTS/RECOMMENDATIONS:    A/P:  74y m s/p revision L2-S1 posterior spinal fusion with extension to the pelvis and bilateral L5/S1 laminectomies  -PT/OT   -WBAT BLLE  -Plan to DC angelique in AM once patient ambulating  -Please document hemovac drain outputs every shift  -Continue ancef while drain in place, will discontinue upon removal of drain when output appropriate  -FU OR pathology, FU OR cultures (received)  -Pain Control as needed  -DVT ppx to start on POD2 with Lovenox 40mg, patient will be discharged on Xarelto  -FU AM Labs  -Incentive Spirometry  -Medical management appreciated  -Discussed with Dr Acuña who agrees with plan

## 2025-05-15 NOTE — OCCUPATIONAL THERAPY INITIAL EVALUATION ADULT - ADDITIONAL COMMENTS
Pt lives with spouse in elevator access building +no stairs to enter. PTA pt was indep in ADL/amb with cane. Owns RW, tub shower.

## 2025-05-15 NOTE — PHYSICAL THERAPY INITIAL EVALUATION ADULT - DIAGNOSIS, PT EVAL
Pt presents with postop pain, impairments in ROM, strength, balance, endurance all impacting ability to perform ADLs and functional mobility

## 2025-05-15 NOTE — PHYSICAL THERAPY INITIAL EVALUATION ADULT - DID THE PATIENT HAVE SURGERY?
Received order requesting to update PCP to Dr. Pablito De Jesus is Approved and finalized on March 18, 2024.    Thanks,  [facility] Team         s/p revision L2-S1 posterior spinal fusion with extension to pelvis L5-S1 laminectomy/yes

## 2025-05-15 NOTE — PHYSICAL THERAPY INITIAL EVALUATION ADULT - PERTINENT HX OF CURRENT PROBLEM, REHAB EVAL
73 yo male with Hypothyroidism, squamous cell CA, anxiety, chronic back pain, with spinal stenosis, s/p cervical laminectomy and fusion, and lumbar laminectomy last in 2013. Now with pain radiating to LLE and now using cane c/o difficulty walking. Pt admitted with spinal stenosis now s/p revision L2-S1 posterior spinal fusion with extension to pelvis L5-S1 laminectomy, 5/14/25.    [facility] Duplex B LE Vein Scan 5/15/25: No evidence of deep venous thrombosis in either lower extremity.  MR C-Spine 5/10/25: Postsurgical changes as detailed above. Note that MR is suboptimal for the assessment of hardware integrity. Consider further evaluation with CT of the cervical spine without contrast as clinically indicated. Multilevel cervical spondylosis as detailed above with at most moderate to severe spinal canal stenosis and severe neuroforaminal stenosis.  CT L-Spine 5/10/25: Status post decompression and instrumented fusion from L2 to S1 with pedicle screws and interconnecting rods. Lucency about the bilateral S1 screws. Solid osseous fusion across the posterior elements spanning L2-L5. Moderate left and moderate to severe right foraminal narrowing at L5/S1.

## 2025-05-15 NOTE — CONSULT NOTE ADULT - ASSESSMENT
73 yo male with Hypothyroidism, squamous cell CA, anxiety, chronic back pain, with spinal stenosis, s/p cervical laminectomy and fusion, and lumbar laminectomy last in 2013. Now with pain radiating to LLE and now using cane.  c/o difficulty walking.    S/p revision L2-S1 posterior spinal fusion with extension to the pelvis and bilateral L5/S1 laminectomies  Ortho spine f/up appreciated.  On tapering Prednisone.  Pain control with Oxy/Tramadol  Bowel regimen.  PT f/up  OOB    Mood disorder:  Cw Seroquel/Bupropion

## 2025-05-15 NOTE — CONSULT NOTE ADULT - SUBJECTIVE AND OBJECTIVE BOX
Patient is a 74y old  Male who presents with a chief complaint of spinal stenosis  Goal: walk without cane   Pain:7/10 (02 May 2025 11:21)      HPI:  73 yo male with Hypothyroidism, squamous cell CA, anxiety, chronic back pain, with spinal stenosis, s/p cervical laminectomy and fusion, and lumbar laminectomy last in 2013. Now with pain radiating to LLE and now using cane.  c/o difficulty walking.  (02 May 2025 11:21)      PAST MEDICAL & SURGICAL HISTORY:  Anxiety and depression      BPH (benign prostatic hyperplasia)      SCC (squamous cell carcinoma)  of nose      GERD (gastroesophageal reflux disease)      HLD (hyperlipidemia)      Peyronie's disease      Stomach ulcer      History of spinal stenosis      History of chronic back pain      Herpes zoster      S/P colectomy  1997      History of ankle surgery  1992      H/O foot surgery  right 2014      S/P repair of hydrocele  right 2010      S/P meniscectomy  left 10/2019      S/P parathyroidectomy  right      S/P thyroidectomy  bilateral 2006      H/O ventral hernia repair  umbilical/incisional repair 2016      H/O prostatectomy  2008      S/P cervical spinal fusion  2007      S/P lumbar spinal fusion  2008      H/O squamous cell carcinoma excision  nose (~5-6 yrs ago) and forehead 2021      S/P cataract surgery  bilateral      S/P insertion of spinal cord stimulator  right 7/2021, 1st 2020          Review of Systems:   CONSTITUTIONAL: No fever,  or fatigue  NECK: No pain or stiffness  RESPIRATORY: No cough,  No shortness of breath  CARDIOVASCULAR: No chest pain, palpitations, dizziness, or leg swelling  GASTROINTESTINAL: No abdominal  pain. No nausea, vomiting.  NEUROLOGICAL: No headache          Allergies    sulfa drugs (Hives)  Flagyl (Hives)  contrast media (iodine-based) (Vomiting)    Intolerances    propofol (Vomiting; Nausea)      Social History:     FAMILY HISTORY:      MEDICATIONS  (STANDING):  acetaminophen     Tablet .. 650 milliGRAM(s) Oral every 6 hours  buPROPion XL (24-Hour) . 300 milliGRAM(s) Oral daily  ceFAZolin   IVPB 2000 milliGRAM(s) IV Intermittent every 8 hours  ceFAZolin   IVPB 2000 milliGRAM(s) IV Intermittent every 8 hours  dexAMETHasone  Injectable 5 milliGRAM(s) IV Push every 6 hours  dicyclomine 10 milliGRAM(s) Oral four times a day before meals  gabapentin 600 milliGRAM(s) Oral three times a day  levothyroxine 112 MICROGram(s) Oral daily  pantoprazole    Tablet 40 milliGRAM(s) Oral before breakfast  polyethylene glycol 3350 17 Gram(s) Oral at bedtime  QUEtiapine 300 milliGRAM(s) Oral daily  senna 2 Tablet(s) Oral at bedtime  traZODone 100 milliGRAM(s) Oral at bedtime  valACYclovir 500 milliGRAM(s) Oral daily    MEDICATIONS  (PRN):  ALPRAZolam 1 milliGRAM(s) Oral every 6 hours PRN anxiety  ondansetron Injectable 4 milliGRAM(s) IV Push every 6 hours PRN Nausea and/or Vomiting  oxyCODONE    IR 2.5 milliGRAM(s) Oral every 4 hours PRN Moderate Pain (4 - 6)  oxyCODONE    IR 5 milliGRAM(s) Oral every 4 hours PRN Severe Pain (7 - 10)  traMADol 25 milliGRAM(s) Oral every 6 hours PRN Mild Pain (1 - 3)      CAPILLARY BLOOD GLUCOSE      POCT Blood Glucose.: 169 mg/dL (15 May 2025 00:06)    I&O's Summary    14 May 2025 07:01  -  15 May 2025 07:00  --------------------------------------------------------  IN: 0 mL / OUT: 415 mL / NET: -415 mL    15 May 2025 07:01  -  15 May 2025 13:29  --------------------------------------------------------  IN: 440 mL / OUT: 1015 mL / NET: -575 mL        T(C): 36.6 (05-15-25 @ 12:16), Max: 36.6 (05-15-25 @ 03:15)  HR: 85 (05-15-25 @ 12:16) (72 - 89)  BP: 112/65 (05-15-25 @ 12:16) (93/57 - 135/85)  RR: 18 (05-15-25 @ 12:16) (14 - 18)  SpO2: 94% (05-15-25 @ 12:16) (91% - 100%)    PHYSICAL EXAM:    GENERAL: NAD  NECK: Supple, No JVD  CHEST/LUNG: Clear to auscultation bilaterally; No wheezing.  HEART: Regular rate and rhythm; No murmurs, rubs, or gallops  ABDOMEN: Soft, Nontender, Nondistended; Bowel sounds present  EXTREMITIES:  2+ Peripheral Pulses, No edema  NEUROLOGY: AAOx 3      LABS:                        10.9   13.21 )-----------( 177      ( 15 May 2025 06:51 )             33.6     05-15    140  |  106  |  10  ----------------------------<  149[H]  4.8   |  20[L]  |  0.79    Ca    7.9[L]      15 May 2025 06:53            Urinalysis Basic - ( 15 May 2025 06:53 )    Color: x / Appearance: x / SG: x / pH: x  Gluc: 149 mg/dL / Ketone: x  / Bili: x / Urobili: x   Blood: x / Protein: x / Nitrite: x   Leuk Esterase: x / RBC: x / WBC x   Sq Epi: x / Non Sq Epi: x / Bacteria: x      CAPILLARY BLOOD GLUCOSE      POCT Blood Glucose.: 169 mg/dL (15 May 2025 00:06)        RADIOLOGY & ADDITIONAL TESTS:    Imaging Personally Reviewed:    Consultant(s) Notes Reviewed:      Care Discussed with Consultants/Other Providers:    Thanks for consult. Will follow.

## 2025-05-16 ENCOUNTER — TRANSCRIPTION ENCOUNTER (OUTPATIENT)
Age: 75
End: 2025-05-16

## 2025-05-16 LAB
ANION GAP SERPL CALC-SCNC: 14 MMOL/L — SIGNIFICANT CHANGE UP (ref 5–17)
BASOPHILS # BLD AUTO: 0.01 K/UL — SIGNIFICANT CHANGE UP (ref 0–0.2)
BASOPHILS NFR BLD AUTO: 0.1 % — SIGNIFICANT CHANGE UP (ref 0–2)
BUN SERPL-MCNC: 14 MG/DL — SIGNIFICANT CHANGE UP (ref 7–23)
CALCIUM SERPL-MCNC: 8.6 MG/DL — SIGNIFICANT CHANGE UP (ref 8.4–10.5)
CHLORIDE SERPL-SCNC: 107 MMOL/L — SIGNIFICANT CHANGE UP (ref 96–108)
CO2 SERPL-SCNC: 23 MMOL/L — SIGNIFICANT CHANGE UP (ref 22–31)
CREAT SERPL-MCNC: 0.8 MG/DL — SIGNIFICANT CHANGE UP (ref 0.5–1.3)
EGFR: 92 ML/MIN/1.73M2 — SIGNIFICANT CHANGE UP
EGFR: 92 ML/MIN/1.73M2 — SIGNIFICANT CHANGE UP
EOSINOPHIL # BLD AUTO: 0 K/UL — SIGNIFICANT CHANGE UP (ref 0–0.5)
EOSINOPHIL NFR BLD AUTO: 0 % — SIGNIFICANT CHANGE UP (ref 0–6)
GLUCOSE SERPL-MCNC: 162 MG/DL — HIGH (ref 70–99)
HCT VFR BLD CALC: 34.5 % — LOW (ref 39–50)
HGB BLD-MCNC: 10.8 G/DL — LOW (ref 13–17)
IMM GRANULOCYTES NFR BLD AUTO: 0.6 % — SIGNIFICANT CHANGE UP (ref 0–0.9)
LYMPHOCYTES # BLD AUTO: 1.22 K/UL — SIGNIFICANT CHANGE UP (ref 1–3.3)
LYMPHOCYTES # BLD AUTO: 8.7 % — LOW (ref 13–44)
MCHC RBC-ENTMCNC: 26.4 PG — LOW (ref 27–34)
MCHC RBC-ENTMCNC: 31.3 G/DL — LOW (ref 32–36)
MCV RBC AUTO: 84.4 FL — SIGNIFICANT CHANGE UP (ref 80–100)
MONOCYTES # BLD AUTO: 0.64 K/UL — SIGNIFICANT CHANGE UP (ref 0–0.9)
MONOCYTES NFR BLD AUTO: 4.6 % — SIGNIFICANT CHANGE UP (ref 2–14)
NEUTROPHILS # BLD AUTO: 12.08 K/UL — HIGH (ref 1.8–7.4)
NEUTROPHILS NFR BLD AUTO: 86 % — HIGH (ref 43–77)
NRBC BLD AUTO-RTO: 0 /100 WBCS — SIGNIFICANT CHANGE UP (ref 0–0)
PLATELET # BLD AUTO: 191 K/UL — SIGNIFICANT CHANGE UP (ref 150–400)
POTASSIUM SERPL-MCNC: 4.4 MMOL/L — SIGNIFICANT CHANGE UP (ref 3.5–5.3)
POTASSIUM SERPL-SCNC: 4.4 MMOL/L — SIGNIFICANT CHANGE UP (ref 3.5–5.3)
RBC # BLD: 4.09 M/UL — LOW (ref 4.2–5.8)
RBC # FLD: 14.8 % — HIGH (ref 10.3–14.5)
SODIUM SERPL-SCNC: 144 MMOL/L — SIGNIFICANT CHANGE UP (ref 135–145)
WBC # BLD: 14.03 K/UL — HIGH (ref 3.8–10.5)
WBC # FLD AUTO: 14.03 K/UL — HIGH (ref 3.8–10.5)

## 2025-05-16 RX ADMIN — OXYCODONE HYDROCHLORIDE 5 MILLIGRAM(S): 30 TABLET ORAL at 06:47

## 2025-05-16 RX ADMIN — QUETIAPINE FUMARATE 300 MILLIGRAM(S): 25 TABLET ORAL at 22:33

## 2025-05-16 RX ADMIN — Medication 10 MILLIGRAM(S): at 11:52

## 2025-05-16 RX ADMIN — Medication 10 MILLIGRAM(S): at 05:14

## 2025-05-16 RX ADMIN — Medication 1 MILLIGRAM(S): at 11:57

## 2025-05-16 RX ADMIN — ENOXAPARIN SODIUM 40 MILLIGRAM(S): 100 INJECTION SUBCUTANEOUS at 16:15

## 2025-05-16 RX ADMIN — BUPROPION HYDROBROMIDE 300 MILLIGRAM(S): 522 TABLET, EXTENDED RELEASE ORAL at 11:53

## 2025-05-16 RX ADMIN — Medication 112 MICROGRAM(S): at 05:14

## 2025-05-16 RX ADMIN — Medication 650 MILLIGRAM(S): at 10:14

## 2025-05-16 RX ADMIN — Medication 650 MILLIGRAM(S): at 20:56

## 2025-05-16 RX ADMIN — Medication 100 MILLIGRAM(S): at 20:57

## 2025-05-16 RX ADMIN — Medication 100 MILLIGRAM(S): at 22:33

## 2025-05-16 RX ADMIN — Medication 1 MILLIGRAM(S): at 02:44

## 2025-05-16 RX ADMIN — CYCLOBENZAPRINE HYDROCHLORIDE 5 MILLIGRAM(S): 15 CAPSULE, EXTENDED RELEASE ORAL at 15:12

## 2025-05-16 RX ADMIN — GABAPENTIN 600 MILLIGRAM(S): 400 CAPSULE ORAL at 05:13

## 2025-05-16 RX ADMIN — Medication 100 MILLIGRAM(S): at 05:13

## 2025-05-16 RX ADMIN — Medication 650 MILLIGRAM(S): at 16:14

## 2025-05-16 RX ADMIN — Medication 4 MILLIGRAM(S): at 20:57

## 2025-05-16 RX ADMIN — Medication 40 MILLIGRAM(S): at 05:14

## 2025-05-16 RX ADMIN — Medication 500 MILLIGRAM(S): at 11:53

## 2025-05-16 RX ADMIN — Medication 1 MILLIGRAM(S): at 21:06

## 2025-05-16 RX ADMIN — Medication 650 MILLIGRAM(S): at 09:14

## 2025-05-16 RX ADMIN — Medication 10 MILLIGRAM(S): at 16:19

## 2025-05-16 RX ADMIN — PREDNISONE 40 MILLIGRAM(S): 20 TABLET ORAL at 05:35

## 2025-05-16 RX ADMIN — GABAPENTIN 600 MILLIGRAM(S): 400 CAPSULE ORAL at 22:33

## 2025-05-16 RX ADMIN — Medication 10 MILLIGRAM(S): at 22:34

## 2025-05-16 RX ADMIN — Medication 2 TABLET(S): at 22:34

## 2025-05-16 RX ADMIN — GABAPENTIN 600 MILLIGRAM(S): 400 CAPSULE ORAL at 13:26

## 2025-05-16 RX ADMIN — Medication 100 MILLIGRAM(S): at 13:28

## 2025-05-16 RX ADMIN — OXYCODONE HYDROCHLORIDE 5 MILLIGRAM(S): 30 TABLET ORAL at 05:47

## 2025-05-16 NOTE — DISCHARGE NOTE PROVIDER - NSDCFUADDINST_GEN_ALL_CORE_FT
Continue weight bearing as tolerated ambulation. Keep surgical incision, dressing clean and dry. Follow up with Dr. Acuña in his office post operative day #14(5/28/2025). Please call Dr. Acuña's office within next few days to schedule a follow up appointment about 14 days after surgery.   Please follow Dr. Acuña's instruction sheet upon discharge.  Recommend follow up with Medical MD within next 4 weeks.  Dressing will be changed during office visit.   Out of bed, ambulate, weight bearing as tolerated-   Physical therapy to assist with exercise and help increase endurance.   Please contact Doctors office regarding arrangements for out patient Physical therapy.

## 2025-05-16 NOTE — DISCHARGE NOTE PROVIDER - NSDCFUSCHEDAPPT_GEN_ALL_CORE_FT
[facility]  [id number] [place]  Scheduled Appointment: 06/09/2025

## 2025-05-16 NOTE — DISCHARGE NOTE PROVIDER - HOSPITAL COURSE
History of Present Illness	  75 yo male with Hypothyroidism, squamous cell CA, anxiety, chronic back pain, with spinal stenosis, s/p cervical laminectomy and fusion, and lumbar laminectomy last in 2013. Now with pain radiating to LLE and now using cane.  c/o difficulty walking.      Past Medical, Past Surgical History:  PAST MEDICAL HISTORY:  Anxiety and depression   BPH (benign prostatic hyperplasia)   GERD (gastroesophageal reflux disease)   Herpes zoster   History of chronic back pain   History of spinal stenosis   HLD (hyperlipidemia)   Peyronie's disease   SCC (squamous cell carcinoma) of nose  Stomach ulcer.     PAST SURGICAL HISTORY:  H/O foot surgery right 2014  H/O prostatectomy 2008  H/O squamous cell carcinoma excision nose (~5-6 yrs ago) and forehead 2021  H/O ventral hernia repair umbilical/incisional repair 2016  History of ankle surgery 1992  S/P cataract surgery bilateral  S/P cervical spinal fusion 2007  S/P colectomy 1997  S/P insertion of spinal cord stimulator right 7/2021, 1st 2020  S/P lumbar spinal fusion 2008  S/P meniscectomy left 10/2019  S/P parathyroidectomy right  S/P repair of hydrocele right 2010  S/P thyroidectomy bilateral 2006.    HOSPITAL COURSE:  73 y/o M underwent L2-S1 Revision Laminectomy, PSF on 5/14/2025 with Dr. Acuña.  Patient tolerated procedure well.  Patient was evaluated postoperatively by physical and occupational therapists for weight bearing as tolerated and cleared patient for discharge home.  Patient advised to keep surgical incision/dressing clean and dry, and  follow up with Dr. Acuña in his office post operative day#14 (5/28/2025).   History of Present Illness	  75 yo male with Hypothyroidism, squamous cell CA, anxiety, chronic back pain, with spinal stenosis, s/p cervical laminectomy and fusion, and lumbar laminectomy last in 2013. Now with pain radiating to LLE and now using cane.  c/o difficulty walking.      Past Medical, Past Surgical History:  PAST MEDICAL HISTORY:  Anxiety and depression   BPH (benign prostatic hyperplasia)   GERD (gastroesophageal reflux disease)   Herpes zoster   History of chronic back pain   History of spinal stenosis   HLD (hyperlipidemia)   Peyronie's disease   SCC (squamous cell carcinoma) of nose  Stomach ulcer.     PAST SURGICAL HISTORY:  H/O foot surgery right 2014  H/O prostatectomy 2008  H/O squamous cell carcinoma excision nose (~5-6 yrs ago) and forehead 2021  H/O ventral hernia repair umbilical/incisional repair 2016  History of ankle surgery 1992  S/P cataract surgery bilateral  S/P cervical spinal fusion 2007  S/P colectomy 1997  S/P insertion of spinal cord stimulator right 7/2021, 1st 2020  S/P lumbar spinal fusion 2008  S/P meniscectomy left 10/2019  S/P parathyroidectomy right  S/P repair of hydrocele right 2010  S/P thyroidectomy bilateral 2006.    HOSPITAL COURSE:   Patient presents to the hospital for elective surgery, underwent a L2-S1 Revision Laminectomy, PSF  -tolerated procedure without complications.  Venous dopplers were performed negative for DVT's.  OR cultures sent negative growth.  Patient was evaluated by Physical therapy whom recommended home with home PT for discharge plan.  Patient is stable for discharge when cleared by PT.

## 2025-05-16 NOTE — DISCHARGE NOTE PROVIDER - NSDCCPTREATMENT_GEN_ALL_CORE_FT
PRINCIPAL PROCEDURE  Procedure: Revision, fusion, spine, lumbar  Findings and Treatment: Revision L2-S1 posterior spinal fusion with extension to pelvis, bilateral L5/S1 laminectomies

## 2025-05-16 NOTE — PROGRESS NOTE ADULT - SUBJECTIVE AND OBJECTIVE BOX
Patient is a 74y old  Male who presents with a chief complaint of Spinal Stenosis (16 May 2025 07:41)      SUBJECTIVE / OVERNIGHT EVENTS:    Events noted.  CONSTITUTIONAL: No fever,  or fatigue  RESPIRATORY: No cough, wheezing,  No shortness of breath  CARDIOVASCULAR: No chest pain, palpitations, dizziness, or leg swelling  GASTROINTESTINAL: No abdominal or epigastric pain.       MEDICATIONS  (STANDING):  acetaminophen     Tablet .. 650 milliGRAM(s) Oral every 6 hours  buPROPion XL (24-Hour) . 300 milliGRAM(s) Oral daily  ceFAZolin   IVPB 2000 milliGRAM(s) IV Intermittent every 8 hours  dicyclomine 10 milliGRAM(s) Oral four times a day before meals  enoxaparin Injectable 40 milliGRAM(s) SubCutaneous every 24 hours  gabapentin 600 milliGRAM(s) Oral three times a day  levothyroxine 112 MICROGram(s) Oral daily  pantoprazole    Tablet 40 milliGRAM(s) Oral before breakfast  polyethylene glycol 3350 17 Gram(s) Oral at bedtime  predniSONE   Tablet 40 milliGRAM(s) Oral daily  predniSONE   Tablet   Oral   QUEtiapine 300 milliGRAM(s) Oral daily  senna 2 Tablet(s) Oral at bedtime  traZODone 100 milliGRAM(s) Oral at bedtime  valACYclovir 500 milliGRAM(s) Oral daily    MEDICATIONS  (PRN):  ALPRAZolam 1 milliGRAM(s) Oral every 6 hours PRN anxiety  cyclobenzaprine 5 milliGRAM(s) Oral three times a day PRN Muscle Spasm  ondansetron Injectable 4 milliGRAM(s) IV Push every 6 hours PRN Nausea and/or Vomiting  oxyCODONE    IR 2.5 milliGRAM(s) Oral every 4 hours PRN Moderate Pain (4 - 6)  oxyCODONE    IR 5 milliGRAM(s) Oral every 4 hours PRN Severe Pain (7 - 10)  traMADol 25 milliGRAM(s) Oral every 6 hours PRN Mild Pain (1 - 3)        CAPILLARY BLOOD GLUCOSE        I&O's Summary    15 May 2025 07:01  -  16 May 2025 07:00  --------------------------------------------------------  IN: 1320 mL / OUT: 3145 mL / NET: -1825 mL    16 May 2025 07:01  -  16 May 2025 17:13  --------------------------------------------------------  IN: 680 mL / OUT: 445 mL / NET: 235 mL        T(C): 36.5 (05-16-25 @ 16:05), Max: 37.2 (05-16-25 @ 00:16)  HR: 77 (05-16-25 @ 16:05) (69 - 79)  BP: 122/68 (05-16-25 @ 16:05) (107/61 - 127/64)  RR: 16 (05-16-25 @ 16:05) (16 - 18)  SpO2: 95% (05-16-25 @ 16:05) (93% - 96%)    PHYSICAL EXAM:    NECK: Supple, No JVD  CHEST/LUNG: Clear to auscultation bilaterally; No wheezing.  HEART: Regular rate and rhythm; No murmurs, rubs, or gallops  ABDOMEN: Soft, Nontender, Nondistended; Bowel sounds present  EXTREMITIES:   No edema  NEUROLOGY: AAO X 3      LABS:                        10.8   14.03 )-----------( 191      ( 16 May 2025 07:00 )             34.5     05-16    144  |  107  |  14  ----------------------------<  162[H]  4.4   |  23  |  0.80    Ca    8.6      16 May 2025 06:58            Urinalysis Basic - ( 16 May 2025 06:58 )    Color: x / Appearance: x / SG: x / pH: x  Gluc: 162 mg/dL / Ketone: x  / Bili: x / Urobili: x   Blood: x / Protein: x / Nitrite: x   Leuk Esterase: x / RBC: x / WBC x   Sq Epi: x / Non Sq Epi: x / Bacteria: x      CAPILLARY BLOOD GLUCOSE            RADIOLOGY & ADDITIONAL TESTS:    Imaging Personally Reviewed:    Consultant(s) Notes Reviewed:      Care Discussed with Consultants/Other Providers:    John Valdivia MD, CMD, FACP    [id number] [place] [address]  Office Tel: [phone]

## 2025-05-16 NOTE — PROGRESS NOTE ADULT - SUBJECTIVE AND OBJECTIVE BOX
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  Weakness of  TA, EHL, Peroneals 3/5 Left, 4/5 right.  Reduced L5 sensation L>R.  I reviewed surgical diagnosis and surgical treatment plan, which would be revision laminectomy with interbody at L5S1, with revision Posterior Spinal Fusion L2-Pelvis.  I reviewed all major risks, benefits, and complications with surgical intervention.    POST-OP SYMPTOMS: Patient seen and examined at bedside. No acute complaints at this time. Experiencing some right lower extremity numbness that is unchanged from prior to surgery. Patient has been urinating well, passing gas but no bowel movement yet. Worked with physical therapy yesterday who recommend discharge home.       Medical issues/events Overnight: Stable and doing well in PACU, patient with history of Herpes Zoster (on Valtrex), HLD, Spinal stenosis, SCC     PHYSICAL EXAM  General: NAD, resting comforatbly in bed  SPINE:  Dressing C/D/I  1 HMV drain in place with SS output  2+ radial pulses  2+ DP Pulses    Motor:                   C5                C6              C7               C8           T1   R             5/5                5/5            5/5              5/5          5/5  L             5/5                5/5            5/5              5/5          5/5                    L2                  L3             L4              L5            S1  R            5/5                5/5             5/5            5/5          5/5  L            4*/5               4*/5            5/5            4/5          5/5  *Pain limiting    Sensory:            C5         C6         C7      C8       T1        (0=absent, 1=impaired, 2=normal, NT=not testable)  R         2            2           2        2         2  L          2            1           1        2         2               L2          L3         L4      L5       S1         (0=absent, 1=impaired, 2=normal, NT=not testable)  R         1           1           1       1           1  L          2          2          2        2           2  **upper and lower extremity sensory deficits unchanged from preoperative per patient     VITAL SIGNS:  T(C): 36.6 (05-16-25 @ 04:05), Max: 37.2 (05-16-25 @ 00:16)  HR: 76 (05-16-25 @ 04:05) (76 - 85)  BP: 107/61 (05-16-25 @ 04:05) (107/61 - 129/90)  RR: 18 (05-16-25 @ 04:05) (18 - 18)  SpO2: 94% (05-16-25 @ 04:05) (93% - 96%)    LABS:                        10.9   13.21 )-----------( 177      ( 15 May 2025 06:51 )             33.6     05-15    140  |  106  |  10  ----------------------------<  149[H]  4.8   |  20[L]  |  0.79    Ca    7.9[L]      15 May 2025 06:53        Urinalysis Basic - ( 15 May 2025 06:53 )    Color: x / Appearance: x / SG: x / pH: x  Gluc: 149 mg/dL / Ketone: x  / Bili: x / Urobili: x   Blood: x / Protein: x / Nitrite: x   Leuk Esterase: x / RBC: x / WBC x   Sq Epi: x / Non Sq Epi: x / Bacteria: x  - Intraoperative cultures NGTD  - 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     NEW IMAGING AND RESULTS:  - BL lower extremity Doppler: Negative for DVT in either extremity    PT/OT:  -Last PT/OT session date: 5/15/25  -Progress: ambulated 15 steps x2  -Dispo recommendations: home physical therapy     CONSULTANTS/RECOMMENDATIONS:    A/P:  74y m s/p revision L2-S1 posterior spinal fusion with extension to the pelvis and bilateral L5/S1 laminectomies  -PT/OT   -WBAT BLLE  -Start Lovenox 40mg today on POD2 (5/16/25), will be discharged on Xarelto  -Please document hemovac drain outputs every shift  -Continue ancef while drain in place, will discontinue upon removal of drain when output appropriate  -FU OR pathology  -OR Cultures NGTD  -Pain Control as needed  -Incentive Spirometry  -Medical management appreciated  -Pending discharge home with home PT per physical therapy recommendations  -Discussed with Dr Acuña who agrees with plan

## 2025-05-16 NOTE — DISCHARGE NOTE PROVIDER - CARE PROVIDER_API CALL
Felipe Acuña  Spine Surgery  [address], [address]  [place] [address]  Phone: [phone]  Fax: [phone]  Follow Up Time: 2 weeks

## 2025-05-16 NOTE — DISCHARGE NOTE PROVIDER - NSDCMRMEDTOKEN_GEN_ALL_CORE_FT
ALPRAZOLAM 1 MG TABLET: 1 tablet 4x day  BUPROPION HCL  MG TABLET: TAKE 1 TABLET BY MOUTH EVERY DAY IN THE MORNING  DICYCLOMINE 10 MG CAPSULE: TAKE ONE CAPSULE BY MOUTH 4 TIMES A DAY BEFORE MEALS AND AT NIGHT  GABAPENTIN 300 MG CAPSULE: TAKE 2 CAPSULE (300 MG) BY MOUTH 3 TIMES A DAY AS TOLERATED. MAX DAILY DOSE: 900 MG  LEVOTHYROXIN TAB 112MCG:   QUETIAPINE   TAB 300MG:   TRAZODONE    TAB 100MG:   VALACYCLOVIR TAB 500MG:    acetaminophen 325 mg oral tablet: 3 tab(s) orally every 8 hours as needed for HA, Fever or Pain  ALPRAZolam 1 mg oral tablet: 1 tab(s) orally every 6 hours As needed anxiety  buPROPion 300 mg/24 hours (XL) oral tablet, extended release: 1 tab(s) orally once a day  cyclobenzaprine 5 mg oral tablet: 1 tab(s) orally 3 times a day as needed for Muscle Spasm MDD: 3  dicyclomine 10 mg oral capsule: 1 cap(s) orally 4 times a day (before meals and at bedtime)  Eliquis 2.5 mg oral tablet: 1 tab(s) orally 2 times a day Continue for 30 days, discuss with surgeon about length of required treatment MDD: 2  gabapentin 300 mg oral capsule: 3 cap(s) orally 3 times a day  lactulose 10 g/15 mL oral syrup: 15 milliliter(s) orally 2 times a day As needed constipation  levothyroxine 112 mcg (0.112 mg) oral tablet: 1 tab(s) orally once a day  naloxone 4 mg/0.1 mL nasal spray: 1 spray(s) intranasally every 2 minutes as needed for antidote alternate nostrils  oxyCODONE 5 mg oral tablet: 1 tab(s) orally every 4 hours as needed for Moderate Pain (4 - 6) Or 2 tabs orally every 4 hours as needed for severe pain (7-10) MDD: 6  pantoprazole 40 mg oral delayed release tablet: 1 tab(s) orally once a day (before a meal) MDD: 1  polyethylene glycol 3350 oral powder for reconstitution: 17 gram(s) orally once a day (at bedtime) as needed for  constipation  predniSONE 10 mg oral tablet: 3 tab(s) orally once a day , Continue steroid taper 2 tabs orally every day x 3 days, then 1 tab orally every day x 3 days, then stop  QUEtiapine 300 mg oral tablet: 1 tab(s) orally once a day  senna leaf extract oral tablet: 2 tab(s) orally once a day (at bedtime)  traMADol 50 mg oral tablet: 0.5 tab(s) orally every 6 hours as needed for Mild Pain (1 - 3) MDD: 2  traZODone 100 mg oral tablet: 1 tab(s) orally once a day (at bedtime)  valACYclovir 500 mg oral tablet: 1 tab(s) orally once a day

## 2025-05-16 NOTE — DISCHARGE NOTE PROVIDER - NSDCCPCAREPLAN_GEN_ALL_CORE_FT
PRINCIPAL DISCHARGE DIAGNOSIS  Diagnosis: Spinal stenosis, lumbosacral region  Assessment and Plan of Treatment:

## 2025-05-17 LAB
ANION GAP SERPL CALC-SCNC: 11 MMOL/L — SIGNIFICANT CHANGE UP (ref 5–17)
BASOPHILS # BLD AUTO: 0.01 K/UL — SIGNIFICANT CHANGE UP (ref 0–0.2)
BASOPHILS NFR BLD AUTO: 0.1 % — SIGNIFICANT CHANGE UP (ref 0–2)
BUN SERPL-MCNC: 18 MG/DL — SIGNIFICANT CHANGE UP (ref 7–23)
CALCIUM SERPL-MCNC: 8.4 MG/DL — SIGNIFICANT CHANGE UP (ref 8.4–10.5)
CHLORIDE SERPL-SCNC: 106 MMOL/L — SIGNIFICANT CHANGE UP (ref 96–108)
CO2 SERPL-SCNC: 26 MMOL/L — SIGNIFICANT CHANGE UP (ref 22–31)
CREAT SERPL-MCNC: 0.96 MG/DL — SIGNIFICANT CHANGE UP (ref 0.5–1.3)
EGFR: 82 ML/MIN/1.73M2 — SIGNIFICANT CHANGE UP
EGFR: 82 ML/MIN/1.73M2 — SIGNIFICANT CHANGE UP
EOSINOPHIL # BLD AUTO: 0.01 K/UL — SIGNIFICANT CHANGE UP (ref 0–0.5)
EOSINOPHIL NFR BLD AUTO: 0.1 % — SIGNIFICANT CHANGE UP (ref 0–6)
GLUCOSE SERPL-MCNC: 101 MG/DL — HIGH (ref 70–99)
HCT VFR BLD CALC: 31.5 % — LOW (ref 39–50)
HGB BLD-MCNC: 10 G/DL — LOW (ref 13–17)
IMM GRANULOCYTES NFR BLD AUTO: 1.5 % — HIGH (ref 0–0.9)
LYMPHOCYTES # BLD AUTO: 2.01 K/UL — SIGNIFICANT CHANGE UP (ref 1–3.3)
LYMPHOCYTES # BLD AUTO: 20.2 % — SIGNIFICANT CHANGE UP (ref 13–44)
MCHC RBC-ENTMCNC: 26.9 PG — LOW (ref 27–34)
MCHC RBC-ENTMCNC: 31.7 G/DL — LOW (ref 32–36)
MCV RBC AUTO: 84.7 FL — SIGNIFICANT CHANGE UP (ref 80–100)
MONOCYTES # BLD AUTO: 0.91 K/UL — HIGH (ref 0–0.9)
MONOCYTES NFR BLD AUTO: 9.2 % — SIGNIFICANT CHANGE UP (ref 2–14)
NEUTROPHILS # BLD AUTO: 6.84 K/UL — SIGNIFICANT CHANGE UP (ref 1.8–7.4)
NEUTROPHILS NFR BLD AUTO: 68.9 % — SIGNIFICANT CHANGE UP (ref 43–77)
NRBC BLD AUTO-RTO: 0 /100 WBCS — SIGNIFICANT CHANGE UP (ref 0–0)
PLATELET # BLD AUTO: 161 K/UL — SIGNIFICANT CHANGE UP (ref 150–400)
POTASSIUM SERPL-MCNC: 4 MMOL/L — SIGNIFICANT CHANGE UP (ref 3.5–5.3)
POTASSIUM SERPL-SCNC: 4 MMOL/L — SIGNIFICANT CHANGE UP (ref 3.5–5.3)
RBC # BLD: 3.72 M/UL — LOW (ref 4.2–5.8)
RBC # FLD: 15 % — HIGH (ref 10.3–14.5)
SODIUM SERPL-SCNC: 143 MMOL/L — SIGNIFICANT CHANGE UP (ref 135–145)
WBC # BLD: 9.93 K/UL — SIGNIFICANT CHANGE UP (ref 3.8–10.5)
WBC # FLD AUTO: 9.93 K/UL — SIGNIFICANT CHANGE UP (ref 3.8–10.5)

## 2025-05-17 RX ORDER — LACTULOSE 10 G/15ML
10 SOLUTION ORAL
Refills: 0 | Status: DISCONTINUED | OUTPATIENT
Start: 2025-05-17 | End: 2025-05-20

## 2025-05-17 RX ORDER — OXYCODONE HYDROCHLORIDE 30 MG/1
5 TABLET ORAL EVERY 4 HOURS
Refills: 0 | Status: DISCONTINUED | OUTPATIENT
Start: 2025-05-17 | End: 2025-05-20

## 2025-05-17 RX ORDER — OXYCODONE HYDROCHLORIDE 30 MG/1
10 TABLET ORAL EVERY 4 HOURS
Refills: 0 | Status: DISCONTINUED | OUTPATIENT
Start: 2025-05-17 | End: 2025-05-20

## 2025-05-17 RX ADMIN — TRAMADOL HYDROCHLORIDE 25 MILLIGRAM(S): 50 TABLET, FILM COATED ORAL at 11:49

## 2025-05-17 RX ADMIN — Medication 100 MILLIGRAM(S): at 12:14

## 2025-05-17 RX ADMIN — TRAMADOL HYDROCHLORIDE 25 MILLIGRAM(S): 50 TABLET, FILM COATED ORAL at 12:49

## 2025-05-17 RX ADMIN — BUPROPION HYDROBROMIDE 300 MILLIGRAM(S): 522 TABLET, EXTENDED RELEASE ORAL at 11:45

## 2025-05-17 RX ADMIN — GABAPENTIN 600 MILLIGRAM(S): 400 CAPSULE ORAL at 22:31

## 2025-05-17 RX ADMIN — Medication 100 MILLIGRAM(S): at 22:31

## 2025-05-17 RX ADMIN — Medication 2 TABLET(S): at 22:31

## 2025-05-17 RX ADMIN — OXYCODONE HYDROCHLORIDE 10 MILLIGRAM(S): 30 TABLET ORAL at 19:38

## 2025-05-17 RX ADMIN — Medication 112 MICROGRAM(S): at 05:04

## 2025-05-17 RX ADMIN — CYCLOBENZAPRINE HYDROCHLORIDE 5 MILLIGRAM(S): 15 CAPSULE, EXTENDED RELEASE ORAL at 11:49

## 2025-05-17 RX ADMIN — QUETIAPINE FUMARATE 300 MILLIGRAM(S): 25 TABLET ORAL at 22:31

## 2025-05-17 RX ADMIN — Medication 650 MILLIGRAM(S): at 16:55

## 2025-05-17 RX ADMIN — OXYCODONE HYDROCHLORIDE 10 MILLIGRAM(S): 30 TABLET ORAL at 15:30

## 2025-05-17 RX ADMIN — Medication 650 MILLIGRAM(S): at 17:42

## 2025-05-17 RX ADMIN — PREDNISONE 40 MILLIGRAM(S): 20 TABLET ORAL at 05:05

## 2025-05-17 RX ADMIN — OXYCODONE HYDROCHLORIDE 5 MILLIGRAM(S): 30 TABLET ORAL at 23:03

## 2025-05-17 RX ADMIN — ENOXAPARIN SODIUM 40 MILLIGRAM(S): 100 INJECTION SUBCUTANEOUS at 16:56

## 2025-05-17 RX ADMIN — GABAPENTIN 600 MILLIGRAM(S): 400 CAPSULE ORAL at 13:38

## 2025-05-17 RX ADMIN — Medication 500 MILLIGRAM(S): at 11:45

## 2025-05-17 RX ADMIN — Medication 1 MILLIGRAM(S): at 11:49

## 2025-05-17 RX ADMIN — Medication 40 MILLIGRAM(S): at 05:04

## 2025-05-17 RX ADMIN — Medication 650 MILLIGRAM(S): at 23:31

## 2025-05-17 RX ADMIN — GABAPENTIN 600 MILLIGRAM(S): 400 CAPSULE ORAL at 05:04

## 2025-05-17 RX ADMIN — Medication 10 MILLIGRAM(S): at 05:04

## 2025-05-17 RX ADMIN — OXYCODONE HYDROCHLORIDE 10 MILLIGRAM(S): 30 TABLET ORAL at 14:30

## 2025-05-17 RX ADMIN — Medication 650 MILLIGRAM(S): at 22:31

## 2025-05-17 RX ADMIN — OXYCODONE HYDROCHLORIDE 10 MILLIGRAM(S): 30 TABLET ORAL at 18:38

## 2025-05-17 RX ADMIN — Medication 100 MILLIGRAM(S): at 04:01

## 2025-05-17 RX ADMIN — Medication 10 MILLIGRAM(S): at 16:55

## 2025-05-17 RX ADMIN — Medication 650 MILLIGRAM(S): at 04:02

## 2025-05-17 RX ADMIN — OXYCODONE HYDROCHLORIDE 5 MILLIGRAM(S): 30 TABLET ORAL at 22:03

## 2025-05-17 RX ADMIN — Medication 100 MILLIGRAM(S): at 21:55

## 2025-05-17 RX ADMIN — Medication 10 MILLIGRAM(S): at 22:31

## 2025-05-17 RX ADMIN — Medication 1 MILLIGRAM(S): at 05:12

## 2025-05-17 RX ADMIN — Medication 10 MILLIGRAM(S): at 11:45

## 2025-05-17 NOTE — PROGRESS NOTE ADULT - SUBJECTIVE AND OBJECTIVE BOX
Patient is a 74y old  Male who presents with a chief complaint of L2-S1 revision Lami/PSF (17 May 2025 09:10)      SUBJECTIVE / OVERNIGHT EVENTS:    Events noted.  CONSTITUTIONAL: No fever  RESPIRATORY: No cough, wheezing,  No shortness of breath  CARDIOVASCULAR: No chest pain, palpitations, dizziness, or leg swelling  GASTROINTESTINAL: No abdominal or epigastric pain. No nausea, vomiting.  NEUROLOGICAL: No headache    MEDICATIONS  (STANDING):  acetaminophen     Tablet .. 650 milliGRAM(s) Oral every 6 hours  buPROPion XL (24-Hour) . 300 milliGRAM(s) Oral daily  ceFAZolin   IVPB 2000 milliGRAM(s) IV Intermittent every 8 hours  dicyclomine 10 milliGRAM(s) Oral four times a day before meals  enoxaparin Injectable 40 milliGRAM(s) SubCutaneous every 24 hours  gabapentin 600 milliGRAM(s) Oral three times a day  levothyroxine 112 MICROGram(s) Oral daily  pantoprazole    Tablet 40 milliGRAM(s) Oral before breakfast  polyethylene glycol 3350 17 Gram(s) Oral at bedtime  predniSONE   Tablet 40 milliGRAM(s) Oral daily  predniSONE   Tablet   Oral   QUEtiapine 300 milliGRAM(s) Oral daily  senna 2 Tablet(s) Oral at bedtime  traZODone 100 milliGRAM(s) Oral at bedtime  valACYclovir 500 milliGRAM(s) Oral daily    MEDICATIONS  (PRN):  ALPRAZolam 1 milliGRAM(s) Oral every 6 hours PRN anxiety  cyclobenzaprine 5 milliGRAM(s) Oral three times a day PRN Muscle Spasm  lactulose Syrup 10 Gram(s) Oral two times a day PRN constipation  ondansetron Injectable 4 milliGRAM(s) IV Push every 6 hours PRN Nausea and/or Vomiting  oxyCODONE    IR 10 milliGRAM(s) Oral every 4 hours PRN Severe Pain (7 - 10)  oxyCODONE    IR 5 milliGRAM(s) Oral every 4 hours PRN Moderate Pain (4 - 6)  traMADol 25 milliGRAM(s) Oral every 6 hours PRN Mild Pain (1 - 3)        CAPILLARY BLOOD GLUCOSE        I&O's Summary    16 May 2025 07:01  -  17 May 2025 07:00  --------------------------------------------------------  IN: 980 mL / OUT: 1625 mL / NET: -645 mL    17 May 2025 07:01  -  17 May 2025 16:03  --------------------------------------------------------  IN: 680 mL / OUT: 390 mL / NET: 290 mL        T(C): 36.8 (05-17-25 @ 14:48), Max: 37.2 (05-17-25 @ 08:47)  HR: 62 (05-17-25 @ 14:48) (57 - 77)  BP: 128/70 (05-17-25 @ 14:48) (114/63 - 144/84)  RR: 16 (05-17-25 @ 14:48) (16 - 16)  SpO2: 95% (05-17-25 @ 14:48) (94% - 95%)    PHYSICAL EXAM:    NECK: Supple, No JVD  CHEST/LUNG: Clear to auscultation bilaterally; No wheezing.  HEART: Regular rate and rhythm; No murmurs, rubs, or gallops  ABDOMEN: Soft, Nontender, Nondistended; Bowel sounds present  EXTREMITIES:   No edema  NEUROLOGY: AAO X 3      LABS:                        10.0   9.93  )-----------( 161      ( 17 May 2025 05:18 )             31.5     05-17    143  |  106  |  18  ----------------------------<  101[H]  4.0   |  26  |  0.96    Ca    8.4      17 May 2025 05:17            Urinalysis Basic - ( 17 May 2025 05:17 )    Color: x / Appearance: x / SG: x / pH: x  Gluc: 101 mg/dL / Ketone: x  / Bili: x / Urobili: x   Blood: x / Protein: x / Nitrite: x   Leuk Esterase: x / RBC: x / WBC x   Sq Epi: x / Non Sq Epi: x / Bacteria: x      CAPILLARY BLOOD GLUCOSE            RADIOLOGY & ADDITIONAL TESTS:    Imaging Personally Reviewed:    Consultant(s) Notes Reviewed:      Care Discussed with Consultants/Other Providers:    John Valdivia MD, CMD, FACP    [id number] [place] [address]  Office Tel: [phone]

## 2025-05-17 NOTE — PROGRESS NOTE ADULT - SUBJECTIVE AND OBJECTIVE BOX
s/p L2-S1 revision Lami/PSF on 5/14 POD #3.   Patient seen and evaluated at bedside. Denies CP/SOB. No N/V/D/HA.     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  Weakness of  TA, EHL, Peroneals 3/5 Left, 4/5 right.  Reduced L5 sensation L>R.  I reviewed surgical diagnosis and surgical treatment plan, which would be revision laminectomy with interbody at L5S1, with revision Posterior Spinal Fusion L2-Pelvis.  I reviewed all major risks, benefits, and complications with surgical intervention.    POST-OP SYMPTOMS: Patient seen and examined at bedside. No acute complaints at this time. Experiencing some right lower extremity numbness that is unchanged from prior to surgery. Patient has been urinating well, passing gas but no bowel movement yet. Worked with physical therapy yesterday who recommend discharge home.     Medical issues/events Overnight: None.     PHYSICAL EXAM  General: NAD, resting comforatbly in bed  SPINE:  Dressing C/D/I  1 HMV drain in place with SS output  2+ radial pulses  2+ DP Pulses    Motor:                   C5                C6              C7               C8           T1   R             5/5                5/5            5/5              5/5          5/5  L             5/5                5/5            5/5              5/5          5/5                    L2                  L3             L4              L5            S1  R            4/5                4/5             5/5            5/5          5/5  L            4*/5               4*/5            5/5            4/5          5/5  *Pain limiting    Sensory:            C5         C6         C7      C8       T1        (0=absent, 1=impaired, 2=normal, NT=not testable)  R         2            2           2        2         2  L          2            1           1        2         2               L2          L3         L4      L5       S1         (0=absent, 1=impaired, 2=normal, NT=not testable)  R         1           1           1       1           1  L          2          2          2        2           2  **upper and lower extremity sensory deficits unchanged from preoperative per patient       VITAL SIGNS   T(C): 36.6 (05-17-25 @ 04:15), Max: 36.8 (05-16-25 @ 20:35)  HR: 65 (05-17-25 @ 04:15) (65 - 78)  BP: 129/69 (05-17-25 @ 04:15) (114/63 - 135/76)  RR: 16 (05-17-25 @ 04:15) (16 - 18)  SpO2: 94% (05-17-25 @ 04:15) (94% - 95%)    05-16-25 @ 07:01  -  05-17-25 @ 07:00  --------------------------------------------------------  IN: 980 mL / OUT: 1625 mL / NET: -645 mL        LABS: including new culture/gram stain results     NEW IMAGING AND RESULTS:    PT/OT:  -Last PT/OT session date:  5/16  -Progress: Transfers with RW and CGAx1, standing marches tolerated well. Functional ambulation within the hallway with RW and CGAx1 50ftx2, negotiated 2 steps in rehab gym with bilateral rails and a step-over pattern. PT educated patient on car transfer training; pt able to show back good understanding proper hand placement and sequencing.   -Dispo recommendations: Home with home PT      CONSULTANTS/RECOMMENDATIONS: Plastics team looked at incision yesterday (Dr. Riley)--- healing well, nothing to do but daily dressing changes.

## 2025-05-18 RX ORDER — GABAPENTIN 400 MG/1
900 CAPSULE ORAL THREE TIMES A DAY
Refills: 0 | Status: DISCONTINUED | OUTPATIENT
Start: 2025-05-18 | End: 2025-05-20

## 2025-05-18 RX ADMIN — Medication 10 MILLIGRAM(S): at 21:11

## 2025-05-18 RX ADMIN — OXYCODONE HYDROCHLORIDE 10 MILLIGRAM(S): 30 TABLET ORAL at 13:25

## 2025-05-18 RX ADMIN — Medication 650 MILLIGRAM(S): at 05:25

## 2025-05-18 RX ADMIN — ENOXAPARIN SODIUM 40 MILLIGRAM(S): 100 INJECTION SUBCUTANEOUS at 16:38

## 2025-05-18 RX ADMIN — Medication 1 MILLIGRAM(S): at 08:03

## 2025-05-18 RX ADMIN — Medication 100 MILLIGRAM(S): at 13:25

## 2025-05-18 RX ADMIN — Medication 1 MILLIGRAM(S): at 22:49

## 2025-05-18 RX ADMIN — Medication 40 MILLIGRAM(S): at 05:25

## 2025-05-18 RX ADMIN — Medication 100 MILLIGRAM(S): at 21:11

## 2025-05-18 RX ADMIN — BUPROPION HYDROBROMIDE 300 MILLIGRAM(S): 522 TABLET, EXTENDED RELEASE ORAL at 11:02

## 2025-05-18 RX ADMIN — OXYCODONE HYDROCHLORIDE 10 MILLIGRAM(S): 30 TABLET ORAL at 14:25

## 2025-05-18 RX ADMIN — GABAPENTIN 900 MILLIGRAM(S): 400 CAPSULE ORAL at 13:25

## 2025-05-18 RX ADMIN — Medication 10 MILLIGRAM(S): at 16:38

## 2025-05-18 RX ADMIN — Medication 650 MILLIGRAM(S): at 17:29

## 2025-05-18 RX ADMIN — QUETIAPINE FUMARATE 300 MILLIGRAM(S): 25 TABLET ORAL at 21:11

## 2025-05-18 RX ADMIN — Medication 650 MILLIGRAM(S): at 16:37

## 2025-05-18 RX ADMIN — OXYCODONE HYDROCHLORIDE 10 MILLIGRAM(S): 30 TABLET ORAL at 08:03

## 2025-05-18 RX ADMIN — Medication 10 MILLIGRAM(S): at 11:02

## 2025-05-18 RX ADMIN — PREDNISONE 40 MILLIGRAM(S): 20 TABLET ORAL at 05:25

## 2025-05-18 RX ADMIN — Medication 500 MILLIGRAM(S): at 11:02

## 2025-05-18 RX ADMIN — Medication 2 TABLET(S): at 21:11

## 2025-05-18 RX ADMIN — Medication 100 MILLIGRAM(S): at 21:10

## 2025-05-18 RX ADMIN — CYCLOBENZAPRINE HYDROCHLORIDE 5 MILLIGRAM(S): 15 CAPSULE, EXTENDED RELEASE ORAL at 08:03

## 2025-05-18 RX ADMIN — Medication 112 MICROGRAM(S): at 05:25

## 2025-05-18 RX ADMIN — Medication 1 MILLIGRAM(S): at 16:38

## 2025-05-18 RX ADMIN — Medication 650 MILLIGRAM(S): at 10:20

## 2025-05-18 RX ADMIN — GABAPENTIN 900 MILLIGRAM(S): 400 CAPSULE ORAL at 21:11

## 2025-05-18 RX ADMIN — GABAPENTIN 600 MILLIGRAM(S): 400 CAPSULE ORAL at 05:25

## 2025-05-18 RX ADMIN — Medication 650 MILLIGRAM(S): at 11:20

## 2025-05-18 RX ADMIN — Medication 650 MILLIGRAM(S): at 06:25

## 2025-05-18 RX ADMIN — OXYCODONE HYDROCHLORIDE 10 MILLIGRAM(S): 30 TABLET ORAL at 09:03

## 2025-05-18 RX ADMIN — Medication 10 MILLIGRAM(S): at 05:25

## 2025-05-18 RX ADMIN — Medication 100 MILLIGRAM(S): at 05:25

## 2025-05-18 RX ADMIN — Medication 650 MILLIGRAM(S): at 21:11

## 2025-05-18 NOTE — PROGRESS NOTE ADULT - SUBJECTIVE AND OBJECTIVE BOX
s/p L2-S1 revision Lami/PSF on 5/14 POD #4.   Patient seen and evaluated at bedside. Denies CP/SOB. No N/V/D/HA.     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  Weakness of  TA, EHL, Peroneals 3/5 Left, 4/5 right.  Reduced L5 sensation L>R.  I reviewed surgical diagnosis and surgical treatment plan, which would be revision laminectomy with interbody at L5S1, with revision Posterior Spinal Fusion L2-Pelvis.  I reviewed all major risks, benefits, and complications with surgical intervention.    POST-OP SYMPTOMS: Patient seen and examined at bedside. No acute complaints at this time. Experiencing some right lower extremity numbness that is unchanged from prior to surgery. Patient has been urinating well, passing gas but no bowel movement yet. Worked with physical therapy yesterday who recommend discharge home.     Medical issues/events Overnight: None.     PHYSICAL EXAM  General: NAD, resting comforatbly in bed  SPINE:  Dressing C/D/I  1 HMV drain in place with SS output  2+ radial pulses  2+ DP Pulses    Motor:                               L2        L3         L4           L5            S1  R            5/5       5/5        5/5          5/5          5/5  L             5/5       5/5       5/5           5/5          5/5      Sensory:               L2          L3         L4      L5       S1         (0=absent, 1=impaired, 2=normal, NT=not testable)  R         1           1           1       1           1  L          2          2          2        2           2  **upper and lower extremity sensory deficits unchanged from preoperative per patient     VITAL SIGNS   T(C): 36.3 (05-18-25 @ 07:48), Max: 37.4 (05-17-25 @ 21:01)  HR: 66 (05-18-25 @ 07:48) (62 - 88)  BP: 145/69 (05-18-25 @ 07:48) (116/63 - 145/69)  RR: 18 (05-18-25 @ 07:48) (16 - 18)  SpO2: 94% (05-18-25 @ 07:48) (94% - 95%)    05-17-25 @ 07:01  -  05-18-25 @ 07:00  --------------------------------------------------------  IN: 1040 mL / OUT: 1580 mL / NET: -540 mL        LABS: including new culture/gram stain results     NEW IMAGING AND RESULTS:    PT/OT:  -Last PT/OT session date:  5/17  -Progress: Pt juana 25min Physical therapy follow up session well. S/p L2-S1 poserior spinal fusion /c extension to pelvis L5-S1 and bilateral L5/S1 laminectomies,(5/14). Received sitting in chair +hemovac, +IVL. Pt A&Ox4, follows 100% of commands. Pt performed sit<>stand /c RW supervision. Pt c/o tightness in RLE which improved with standing marches. Pt amb 75'x2 /c RW supervision progressed to independently. Pt neg 2 steps +bilateral HR and supervison. Pt performed car transfer independently with demonstration and verbal instruction. Pt performed sit>supine /c CGA via log roll. Pt left in bed, lines in tact. Purposeful rounding reinforced. RN Mayito aware of session. Pt is cleared from PT standpoint for d/c home with assist from spouse and home PT.     CONSULTANTS/RECOMMENDATIONS: Plastics team looked at incision on 5/16/25 (Dr. Riley)--- healing well, nothing to do but daily dressing changes.

## 2025-05-18 NOTE — PROGRESS NOTE ADULT - SUBJECTIVE AND OBJECTIVE BOX
Patient is a 74y old  Male who presents with a chief complaint of L2-S1 Revision Laminectomy/PSF (18 May 2025 08:47)      SUBJECTIVE / OVERNIGHT EVENTS:    Events noted.  CONSTITUTIONAL: No fever,  or fatigue  RESPIRATORY: No cough, wheezing,  No shortness of breath  CARDIOVASCULAR: No chest pain, palpitations, dizziness, or leg swelling  GASTROINTESTINAL: No abdominal or epigastric pain.       MEDICATIONS  (STANDING):  acetaminophen     Tablet .. 650 milliGRAM(s) Oral every 6 hours  buPROPion XL (24-Hour) . 300 milliGRAM(s) Oral daily  ceFAZolin   IVPB 2000 milliGRAM(s) IV Intermittent every 8 hours  dicyclomine 10 milliGRAM(s) Oral four times a day before meals  enoxaparin Injectable 40 milliGRAM(s) SubCutaneous every 24 hours  gabapentin 900 milliGRAM(s) Oral three times a day  levothyroxine 112 MICROGram(s) Oral daily  pantoprazole    Tablet 40 milliGRAM(s) Oral before breakfast  polyethylene glycol 3350 17 Gram(s) Oral at bedtime  predniSONE   Tablet   Oral   QUEtiapine 300 milliGRAM(s) Oral daily  senna 2 Tablet(s) Oral at bedtime  traZODone 100 milliGRAM(s) Oral at bedtime  valACYclovir 500 milliGRAM(s) Oral daily    MEDICATIONS  (PRN):  ALPRAZolam 1 milliGRAM(s) Oral every 6 hours PRN anxiety  cyclobenzaprine 5 milliGRAM(s) Oral three times a day PRN Muscle Spasm  lactulose Syrup 10 Gram(s) Oral two times a day PRN constipation  ondansetron Injectable 4 milliGRAM(s) IV Push every 6 hours PRN Nausea and/or Vomiting  oxyCODONE    IR 10 milliGRAM(s) Oral every 4 hours PRN Severe Pain (7 - 10)  oxyCODONE    IR 5 milliGRAM(s) Oral every 4 hours PRN Moderate Pain (4 - 6)  traMADol 25 milliGRAM(s) Oral every 6 hours PRN Mild Pain (1 - 3)        CAPILLARY BLOOD GLUCOSE        I&O's Summary    17 May 2025 07:01  -  18 May 2025 07:00  --------------------------------------------------------  IN: 1040 mL / OUT: 1580 mL / NET: -540 mL    18 May 2025 07:01  -  18 May 2025 18:04  --------------------------------------------------------  IN: 800 mL / OUT: 550 mL / NET: 250 mL        T(C): 36.5 (05-18-25 @ 16:59), Max: 37.4 (05-17-25 @ 21:01)  HR: 66 (05-18-25 @ 16:59) (63 - 88)  BP: 129/68 (05-18-25 @ 16:59) (118/72 - 145/69)  RR: 18 (05-18-25 @ 16:59) (16 - 18)  SpO2: 94% (05-18-25 @ 16:59) (93% - 94%)    PHYSICAL EXAM:    NECK: Supple, No JVD  CHEST/LUNG: Clear to auscultation bilaterally; No wheezing.  HEART: Regular rate and rhythm; No murmurs, rubs, or gallops  ABDOMEN: Soft, Nontender, Nondistended; Bowel sounds present  EXTREMITIES:   No edema  NEUROLOGY: AAO X 3      LABS:                        10.0   9.93  )-----------( 161      ( 17 May 2025 05:18 )             31.5     05-17    143  |  106  |  18  ----------------------------<  101[H]  4.0   |  26  |  0.96    Ca    8.4      17 May 2025 05:17            Urinalysis Basic - ( 17 May 2025 05:17 )    Color: x / Appearance: x / SG: x / pH: x  Gluc: 101 mg/dL / Ketone: x  / Bili: x / Urobili: x   Blood: x / Protein: x / Nitrite: x   Leuk Esterase: x / RBC: x / WBC x   Sq Epi: x / Non Sq Epi: x / Bacteria: x      CAPILLARY BLOOD GLUCOSE            RADIOLOGY & ADDITIONAL TESTS:    Imaging Personally Reviewed:    Consultant(s) Notes Reviewed:      Care Discussed with Consultants/Other Providers:    John Valdivia MD, CMD, FACP    [phone] [place] [id number]  Office Tel: [phone]

## 2025-05-19 RX ADMIN — QUETIAPINE FUMARATE 300 MILLIGRAM(S): 25 TABLET ORAL at 21:12

## 2025-05-19 RX ADMIN — GABAPENTIN 900 MILLIGRAM(S): 400 CAPSULE ORAL at 21:12

## 2025-05-19 RX ADMIN — Medication 100 MILLIGRAM(S): at 13:38

## 2025-05-19 RX ADMIN — Medication 650 MILLIGRAM(S): at 21:11

## 2025-05-19 RX ADMIN — GABAPENTIN 900 MILLIGRAM(S): 400 CAPSULE ORAL at 06:04

## 2025-05-19 RX ADMIN — Medication 650 MILLIGRAM(S): at 17:35

## 2025-05-19 RX ADMIN — PREDNISONE 30 MILLIGRAM(S): 20 TABLET ORAL at 06:04

## 2025-05-19 RX ADMIN — BUPROPION HYDROBROMIDE 300 MILLIGRAM(S): 522 TABLET, EXTENDED RELEASE ORAL at 11:20

## 2025-05-19 RX ADMIN — Medication 2 TABLET(S): at 21:13

## 2025-05-19 RX ADMIN — Medication 100 MILLIGRAM(S): at 21:11

## 2025-05-19 RX ADMIN — Medication 1 MILLIGRAM(S): at 12:41

## 2025-05-19 RX ADMIN — Medication 10 MILLIGRAM(S): at 06:06

## 2025-05-19 RX ADMIN — Medication 500 MILLIGRAM(S): at 11:20

## 2025-05-19 RX ADMIN — Medication 650 MILLIGRAM(S): at 09:50

## 2025-05-19 RX ADMIN — Medication 10 MILLIGRAM(S): at 11:20

## 2025-05-19 RX ADMIN — Medication 1 MILLIGRAM(S): at 06:06

## 2025-05-19 RX ADMIN — Medication 10 MILLIGRAM(S): at 16:55

## 2025-05-19 RX ADMIN — Medication 10 MILLIGRAM(S): at 21:11

## 2025-05-19 RX ADMIN — Medication 650 MILLIGRAM(S): at 16:55

## 2025-05-19 RX ADMIN — Medication 100 MILLIGRAM(S): at 21:12

## 2025-05-19 RX ADMIN — Medication 100 MILLIGRAM(S): at 06:05

## 2025-05-19 RX ADMIN — Medication 112 MICROGRAM(S): at 05:58

## 2025-05-19 RX ADMIN — Medication 40 MILLIGRAM(S): at 06:05

## 2025-05-19 RX ADMIN — Medication 650 MILLIGRAM(S): at 05:58

## 2025-05-19 RX ADMIN — ENOXAPARIN SODIUM 40 MILLIGRAM(S): 100 INJECTION SUBCUTANEOUS at 16:55

## 2025-05-19 RX ADMIN — Medication 650 MILLIGRAM(S): at 10:50

## 2025-05-19 RX ADMIN — GABAPENTIN 900 MILLIGRAM(S): 400 CAPSULE ORAL at 13:38

## 2025-05-19 NOTE — PROGRESS NOTE ADULT - SUBJECTIVE AND OBJECTIVE BOX
s/p L2-S1 revision Lami/PSF on 5/14 POD #4.   Patient seen and evaluated at bedside. Denies CP/SOB. No N/V/D/HA.     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  Weakness of  TA, EHL, Peroneals 3/5 Left, 4/5 right.  Reduced L5 sensation L>R.  I reviewed surgical diagnosis and surgical treatment plan, which would be revision laminectomy with interbody at L5S1, with revision Posterior Spinal Fusion L2-Pelvis.  I reviewed all major risks, benefits, and complications with surgical intervention.    POST-OP SYMPTOMS: Patient seen and examined at bedside. No acute complaints at this time. Experiencing some right lower extremity numbness that is unchanged from prior to surgery. Patient has been urinating well, passing gas but no bowel movement yet. Worked with physical therapy yesterday who recommend discharge home.     Medical issues/events Overnight: None.       Vital Signs Last 24 Hrs  T(C): 36.5 (19 May 2025 04:30), Max: 36.6 (18 May 2025 20:39)  T(F): 97.7 (19 May 2025 04:30), Max: 97.9 (18 May 2025 20:39)  HR: 62 (19 May 2025 04:30) (57 - 72)  BP: 119/70 (19 May 2025 04:30) (104/56 - 145/69)  BP(mean): --  RR: 18 (19 May 2025 04:30) (18 - 18)  SpO2: 93% (19 May 2025 04:30) (93% - 96%)    Parameters below as of 19 May 2025 04:30  Patient On (Oxygen Delivery Method): room air      PHYSICAL EXAM  General: NAD, resting comforatbly in bed  SPINE:  Dressing C/D/I  1 HMV drain in place with SS output        Motor:                               L2        L3         L4           L5            S1  R            5/5       5/5        5/5          5/5          5/5  L             5/5       5/5       5/5           5/5          5/5      Sensory:               L2          L3         L4      L5       S1         (0=absent, 1=impaired, 2=normal, NT=not testable)  R         1           1           1       1           1  L          2          2          2        2           2    **upper and lower extremity sensory deficits unchanged from preoperative per patient         NEW IMAGING AND RESULTS:    PT/OT:  -Last PT/OT session date:  5/17  -Progress: Pt juana 25min Physical therapy follow up session well. S/p L2-S1 poserior spinal fusion /c extension to pelvis L5-S1 and bilateral L5/S1 laminectomies,(5/14). Received sitting in chair +hemovac, +IVL. Pt A&Ox4, follows 100% of commands. Pt performed sit<>stand /c RW supervision. Pt c/o tightness in RLE which improved with standing marches. Pt amb 75'x2 /c RW supervision progressed to independently. Pt neg 2 steps +bilateral HR and supervison. Pt performed car transfer independently with demonstration and verbal instruction. Pt performed sit>supine /c CGA via log roll. Pt left in bed, lines in tact. Purposeful rounding reinforced. RN Mayito aware of session. Pt is cleared from PT standpoint for d/c home with assist from spouse and home PT.     CONSULTANTS/RECOMMENDATIONS: Plastics team looked at incision on 5/16/25 (Dr. Riley)--- healing well, nothing to do but daily dressing changes.

## 2025-05-19 NOTE — PROGRESS NOTE ADULT - SUBJECTIVE AND OBJECTIVE BOX
Patient is a 74y old  Male who presents with a chief complaint of L2-S1 Revision Laminectomy/PSF (19 May 2025 06:33)      SUBJECTIVE / OVERNIGHT EVENTS:    Events noted.  CONSTITUTIONAL: No fever,  or fatigue  RESPIRATORY: No cough, wheezing,  No shortness of breath  CARDIOVASCULAR: No chest pain, palpitations  GASTROINTESTINAL: No abdominal or epigastric pain.   NEUROLOGICAL: No headache    MEDICATIONS  (STANDING):  acetaminophen     Tablet .. 650 milliGRAM(s) Oral every 6 hours  buPROPion XL (24-Hour) . 300 milliGRAM(s) Oral daily  ceFAZolin   IVPB 2000 milliGRAM(s) IV Intermittent every 8 hours  dicyclomine 10 milliGRAM(s) Oral four times a day before meals  enoxaparin Injectable 40 milliGRAM(s) SubCutaneous every 24 hours  gabapentin 900 milliGRAM(s) Oral three times a day  levothyroxine 112 MICROGram(s) Oral daily  pantoprazole    Tablet 40 milliGRAM(s) Oral before breakfast  polyethylene glycol 3350 17 Gram(s) Oral at bedtime  predniSONE   Tablet 30 milliGRAM(s) Oral daily  predniSONE   Tablet   Oral   QUEtiapine 300 milliGRAM(s) Oral daily  senna 2 Tablet(s) Oral at bedtime  traZODone 100 milliGRAM(s) Oral at bedtime  valACYclovir 500 milliGRAM(s) Oral daily    MEDICATIONS  (PRN):  ALPRAZolam 1 milliGRAM(s) Oral every 6 hours PRN anxiety  cyclobenzaprine 5 milliGRAM(s) Oral three times a day PRN Muscle Spasm  lactulose Syrup 10 Gram(s) Oral two times a day PRN constipation  ondansetron Injectable 4 milliGRAM(s) IV Push every 6 hours PRN Nausea and/or Vomiting  oxyCODONE    IR 10 milliGRAM(s) Oral every 4 hours PRN Severe Pain (7 - 10)  oxyCODONE    IR 5 milliGRAM(s) Oral every 4 hours PRN Moderate Pain (4 - 6)  traMADol 25 milliGRAM(s) Oral every 6 hours PRN Mild Pain (1 - 3)        CAPILLARY BLOOD GLUCOSE        I&O's Summary    18 May 2025 07:01  -  19 May 2025 07:00  --------------------------------------------------------  IN: 1280 mL / OUT: 1070 mL / NET: 210 mL    19 May 2025 07:01  -  19 May 2025 19:18  --------------------------------------------------------  IN: 1020 mL / OUT: 925 mL / NET: 95 mL        T(C): 36.6 (05-19-25 @ 15:54), Max: 36.7 (05-19-25 @ 12:50)  HR: 77 (05-19-25 @ 15:54) (57 - 77)  BP: 119/60 (05-19-25 @ 15:54) (104/56 - 124/66)  RR: 18 (05-19-25 @ 15:54) (18 - 18)  SpO2: 94% (05-19-25 @ 15:54) (93% - 96%)    PHYSICAL EXAM:  GENERAL: NAD  NECK: Supple, No JVD  CHEST/LUNG: Clear to auscultation bilaterally; No wheezing.  HEART: Regular rate and rhythm; No murmurs, rubs, or gallops  ABDOMEN: Soft, Nontender, Nondistended; Bowel sounds present  EXTREMITIES:   No edema  NEUROLOGY: AAO X 3      LABS:                  CAPILLARY BLOOD GLUCOSE            RADIOLOGY & ADDITIONAL TESTS:    Imaging Personally Reviewed:    Consultant(s) Notes Reviewed:      Care Discussed with Consultants/Other Providers:    John Valdivia MD, CMD, FACP    [address] [place] [address]  Office Tel: [phone]

## 2025-05-20 ENCOUNTER — TRANSCRIPTION ENCOUNTER (OUTPATIENT)
Age: 75
End: 2025-05-20

## 2025-05-20 VITALS
DIASTOLIC BLOOD PRESSURE: 79 MMHG | SYSTOLIC BLOOD PRESSURE: 128 MMHG | OXYGEN SATURATION: 91 % | RESPIRATION RATE: 18 BRPM | TEMPERATURE: 98 F | HEART RATE: 62 BPM

## 2025-05-20 LAB
CULTURE RESULTS: SIGNIFICANT CHANGE UP
SPECIMEN SOURCE: SIGNIFICANT CHANGE UP
SURGICAL PATHOLOGY STUDY: SIGNIFICANT CHANGE UP

## 2025-05-20 RX ORDER — LEVOTHYROXINE SODIUM 300 MCG
0 TABLET ORAL
Qty: 0 | Refills: 0 | DISCHARGE

## 2025-05-20 RX ORDER — ACETAMINOPHEN 500 MG/5ML
3 LIQUID (ML) ORAL
Qty: 0 | Refills: 0 | DISCHARGE
Start: 2025-05-20

## 2025-05-20 RX ORDER — QUETIAPINE FUMARATE 25 MG/1
1 TABLET ORAL
Qty: 0 | Refills: 0 | DISCHARGE
Start: 2025-05-20

## 2025-05-20 RX ORDER — TRAZODONE HCL 100 MG
0 TABLET ORAL
Qty: 0 | Refills: 0 | DISCHARGE

## 2025-05-20 RX ORDER — TRAZODONE HCL 100 MG
1 TABLET ORAL
Qty: 0 | Refills: 0 | DISCHARGE
Start: 2025-05-20

## 2025-05-20 RX ORDER — LEVOTHYROXINE SODIUM 300 MCG
1 TABLET ORAL
Qty: 0 | Refills: 0 | DISCHARGE
Start: 2025-05-20

## 2025-05-20 RX ORDER — SENNA 187 MG
2 TABLET ORAL
Qty: 0 | Refills: 0 | DISCHARGE
Start: 2025-05-20

## 2025-05-20 RX ORDER — ALPRAZOLAM 0.5 MG
1 TABLET, EXTENDED RELEASE 24 HR ORAL
Qty: 0 | Refills: 0 | DISCHARGE
Start: 2025-05-20

## 2025-05-20 RX ORDER — OXYCODONE HYDROCHLORIDE 30 MG/1
1 TABLET ORAL
Qty: 42 | Refills: 0
Start: 2025-05-20 | End: 2025-05-26

## 2025-05-20 RX ORDER — NALOXONE HYDROCHLORIDE 0.4 MG/ML
1 INJECTION, SOLUTION INTRAMUSCULAR; INTRAVENOUS; SUBCUTANEOUS
Qty: 1 | Refills: 0
Start: 2025-05-20

## 2025-05-20 RX ORDER — GABAPENTIN 400 MG/1
3 CAPSULE ORAL
Qty: 0 | Refills: 0 | DISCHARGE
Start: 2025-05-20

## 2025-05-20 RX ORDER — APIXABAN 2.5 MG/1
1 TABLET, FILM COATED ORAL
Qty: 60 | Refills: 0
Start: 2025-05-20 | End: 2025-06-18

## 2025-05-20 RX ORDER — ALPRAZOLAM 0.5 MG
0 TABLET, EXTENDED RELEASE 24 HR ORAL
Refills: 0 | DISCHARGE

## 2025-05-20 RX ORDER — PREDNISONE 20 MG/1
3 TABLET ORAL
Qty: 12 | Refills: 0
Start: 2025-05-20

## 2025-05-20 RX ORDER — BUPROPION HYDROBROMIDE 522 MG/1
0 TABLET, EXTENDED RELEASE ORAL
Qty: 0 | Refills: 0 | DISCHARGE

## 2025-05-20 RX ORDER — BUPROPION HYDROBROMIDE 522 MG/1
1 TABLET, EXTENDED RELEASE ORAL
Qty: 0 | Refills: 0 | DISCHARGE
Start: 2025-05-20

## 2025-05-20 RX ORDER — QUETIAPINE FUMARATE 25 MG/1
0 TABLET ORAL
Qty: 0 | Refills: 0 | DISCHARGE

## 2025-05-20 RX ORDER — LACTULOSE 10 G/15ML
15 SOLUTION ORAL
Qty: 0 | Refills: 0 | DISCHARGE
Start: 2025-05-20

## 2025-05-20 RX ORDER — CYCLOBENZAPRINE HYDROCHLORIDE 15 MG/1
1 CAPSULE, EXTENDED RELEASE ORAL
Qty: 21 | Refills: 0
Start: 2025-05-20 | End: 2025-05-26

## 2025-05-20 RX ORDER — GABAPENTIN 400 MG/1
0 CAPSULE ORAL
Refills: 0 | DISCHARGE

## 2025-05-20 RX ORDER — TRAMADOL HYDROCHLORIDE 50 MG/1
0.5 TABLET, FILM COATED ORAL
Qty: 14 | Refills: 0
Start: 2025-05-20 | End: 2025-05-26

## 2025-05-20 RX ORDER — POLYETHYLENE GLYCOL 3350 17 G/17G
17 POWDER, FOR SOLUTION ORAL
Qty: 0 | Refills: 0 | DISCHARGE
Start: 2025-05-20

## 2025-05-20 RX ADMIN — Medication 500 MILLIGRAM(S): at 10:54

## 2025-05-20 RX ADMIN — Medication 1 MILLIGRAM(S): at 00:05

## 2025-05-20 RX ADMIN — Medication 40 MILLIGRAM(S): at 06:28

## 2025-05-20 RX ADMIN — Medication 650 MILLIGRAM(S): at 06:27

## 2025-05-20 RX ADMIN — Medication 10 MILLIGRAM(S): at 10:54

## 2025-05-20 RX ADMIN — Medication 650 MILLIGRAM(S): at 11:55

## 2025-05-20 RX ADMIN — Medication 100 MILLIGRAM(S): at 06:27

## 2025-05-20 RX ADMIN — BUPROPION HYDROBROMIDE 300 MILLIGRAM(S): 522 TABLET, EXTENDED RELEASE ORAL at 10:54

## 2025-05-20 RX ADMIN — Medication 10 MILLIGRAM(S): at 06:28

## 2025-05-20 RX ADMIN — GABAPENTIN 900 MILLIGRAM(S): 400 CAPSULE ORAL at 06:27

## 2025-05-20 RX ADMIN — PREDNISONE 30 MILLIGRAM(S): 20 TABLET ORAL at 06:28

## 2025-05-20 RX ADMIN — Medication 1 MILLIGRAM(S): at 10:58

## 2025-05-20 RX ADMIN — Medication 650 MILLIGRAM(S): at 10:54

## 2025-05-20 RX ADMIN — Medication 112 MICROGRAM(S): at 06:28

## 2025-05-20 NOTE — DISCHARGE NOTE NURSING/CASE MANAGEMENT/SOCIAL WORK - PATIENT PORTAL LINK FT
You can access the FollowMyHealth Patient Portal offered by  by registering at the following website: [email or web address]. By joining [facility]’s FollowMyHealth portal, you will also be able to view your health information using other applications (apps) compatible with our system.

## 2025-05-20 NOTE — PROGRESS NOTE ADULT - SUBJECTIVE AND OBJECTIVE BOX
Patient is a 74y old  Male who presents with a chief complaint of L2-S1 Revision Laminectomy/PSF (19 May 2025 06:33)      SUBJECTIVE / OVERNIGHT EVENTS:    Events noted.  CONSTITUTIONAL: No fever,  or fatigue  RESPIRATORY: No cough, wheezing,  No shortness of breath  CARDIOVASCULAR: No chest pain, palpitations  GASTROINTESTINAL: No abdominal or epigastric pain.   NEUROLOGICAL: No headache    MEDICATIONS  (STANDING):  acetaminophen     Tablet .. 650 milliGRAM(s) Oral every 6 hours  buPROPion XL (24-Hour) . 300 milliGRAM(s) Oral daily  ceFAZolin   IVPB 2000 milliGRAM(s) IV Intermittent every 8 hours  dicyclomine 10 milliGRAM(s) Oral four times a day before meals  enoxaparin Injectable 40 milliGRAM(s) SubCutaneous every 24 hours  gabapentin 900 milliGRAM(s) Oral three times a day  levothyroxine 112 MICROGram(s) Oral daily  pantoprazole    Tablet 40 milliGRAM(s) Oral before breakfast  polyethylene glycol 3350 17 Gram(s) Oral at bedtime  predniSONE   Tablet 30 milliGRAM(s) Oral daily  predniSONE   Tablet   Oral   QUEtiapine 300 milliGRAM(s) Oral daily  senna 2 Tablet(s) Oral at bedtime  traZODone 100 milliGRAM(s) Oral at bedtime  valACYclovir 500 milliGRAM(s) Oral daily    MEDICATIONS  (PRN):  ALPRAZolam 1 milliGRAM(s) Oral every 6 hours PRN anxiety  cyclobenzaprine 5 milliGRAM(s) Oral three times a day PRN Muscle Spasm  lactulose Syrup 10 Gram(s) Oral two times a day PRN constipation  ondansetron Injectable 4 milliGRAM(s) IV Push every 6 hours PRN Nausea and/or Vomiting  oxyCODONE    IR 10 milliGRAM(s) Oral every 4 hours PRN Severe Pain (7 - 10)  oxyCODONE    IR 5 milliGRAM(s) Oral every 4 hours PRN Moderate Pain (4 - 6)  traMADol 25 milliGRAM(s) Oral every 6 hours PRN Mild Pain (1 - 3)        CAPILLARY BLOOD GLUCOSE        I&O's Summary    18 May 2025 07:01  -  19 May 2025 07:00  --------------------------------------------------------  IN: 1280 mL / OUT: 1070 mL / NET: 210 mL    19 May 2025 07:01  -  19 May 2025 19:18  --------------------------------------------------------  IN: 1020 mL / OUT: 925 mL / NET: 95 mL        T(C): 36.6 (05-19-25 @ 15:54), Max: 36.7 (05-19-25 @ 12:50)  HR: 77 (05-19-25 @ 15:54) (57 - 77)  BP: 119/60 (05-19-25 @ 15:54) (104/56 - 124/66)  RR: 18 (05-19-25 @ 15:54) (18 - 18)  SpO2: 94% (05-19-25 @ 15:54) (93% - 96%)    PHYSICAL EXAM:  GENERAL: NAD  NECK: Supple, No JVD  CHEST/LUNG: Clear to auscultation bilaterally; No wheezing.  HEART: Regular rate and rhythm; No murmurs, rubs, or gallops  ABDOMEN: Soft, Nontender, Nondistended; Bowel sounds present  EXTREMITIES:   No edema  NEUROLOGY: AAO X 3      LABS:                  CAPILLARY BLOOD GLUCOSE            RADIOLOGY & ADDITIONAL TESTS:    Imaging Personally Reviewed:    Consultant(s) Notes Reviewed:      Care Discussed with Consultants/Other Providers:    John Valdivia MD, CMD, FACP    [address] [place] [address]  Office Tel: [phone]

## 2025-05-20 NOTE — PROGRESS NOTE ADULT - SUBJECTIVE AND OBJECTIVE BOX
[facility] - Spine Post Op Note Required Info   -------------------------------------------------------------------  s/p L2-S1 revision Lami/PSF on 5/14 POD #6    PRE-OP SYMPTOMS AND DIAGNOSIS:    73 yo male s/p revision laminectomy extension of spinal fusion at L23 and LLIF L23 from L3-S1 approximately 2 years ago, now Pseudarthrosis L5S1, with screw migration of Left L5, with foraminal stenos bilaterally, failed PT, NSAIDS, EDI, with inabilty to walk 5 feet, with cane, walker, has axial back pain with L>R radiculopathy, numbness and tingling.  Weakness of  TA, EHL, Peroneals 3/5 Left, 4/5 right.  Reduced L5 sensation L>R.  I reviewed surgical diagnosis and surgical treatment plan, which would be revision laminectomy with interbody at L5S1, with revision Posterior Spinal Fusion L2-Pelvis.  I reviewed all major risks, benefits, and complications with surgical intervention.    POST-OP SYMPTOMS:  Patient seen and examined at bedside. No acute complaints at this time. Experiencing some right lower extremity numbness that is unchanged. Ambulating with RW. HMV changed this morning.   General: NAD, resting comforatbly in bed  SPINE:  Dressing C/D/I  Dressing changed- distal incision with minimal bleeding expressed. No drainage,       Motor:                               L2        L3         L4           L5            S1  R            5/5       5/5        5/5          5/5          5/5  L             5/5       5/5       5/5           5/5          5/5      Sensory:               L2          L3         L4      L5       S1         (0=absent, 1=impaired, 2=normal, NT=not testable)  R         1           1           1       1           1  L          2          2          2        2           2    **upper and lower extremity sensory deficits unchanged from preoperative per patient             VITAL SIGNS   T(C): 36.7 (05-20-25 @ 04:56), Max: 36.8 (05-19-25 @ 20:00)  HR: 73 (05-20-25 @ 04:56) (65 - 78)  BP: 127/78 (05-20-25 @ 04:56) (115/72 - 160/83)  RR: 18 (05-20-25 @ 04:56) (18 - 18)  SpO2: 93% (05-20-25 @ 04:56) (93% - 95%)    05-18-25 @ 07:01  -  05-19-25 @ 07:00  --------------------------------------------------------  IN: 1280 mL / OUT: 1070 mL / NET: 210 mL    05-19-25 @ 07:01  -  05-20-25 @ 06:22  --------------------------------------------------------  IN: 1380 mL / OUT: 1955 mL / NET: -575 mL            NEW IMAGING AND RESULTS:    PT/OT:  -Last PT/OT session date:  5/17  -Progress: Pt juana 25min Physical therapy follow up session well. S/p L2-S1 poserior spinal fusion /c extension to pelvis L5-S1 and bilateral L5/S1 laminectomies,(5/14). Received sitting in chair +hemovac, +IVL. Pt A&Ox4, follows 100% of commands. Pt performed sit<>stand /c RW supervision. Pt c/o tightness in RLE which improved with standing marches. Pt amb 75'x2 /c RW supervision progressed to independently. Pt neg 2 steps +bilateral HR and supervison. Pt performed car transfer independently with demonstration and verbal instruction. Pt performed sit>supine /c CGA via log roll. Pt left in bed, lines in tact. Purposeful rounding reinforced. RN Mayito aware of session. Pt is cleared from PT standpoint for d/c home with assist from spouse and home PT.     CONSULTANTS/RECOMMENDATIONS: Plastics team looked at incision on 5/16/25 (Dr. Riley)--- healing well, nothing to do but daily dressing changes.       A/P:  74y m s/p revision L2-S1 posterior spinal fusion with extension to the pelvis and bilateral L5/S1 laminectomies, POD4. VSS. NAD.   -PT/OT   -WBAT BLLE  -Start Lovenox 40mg on POD2 (5/16/25), will be discharged on Xarelto  -HMV removed 5/20/25   -Continue ancef while drain in place, will discontinue upon removal of drain when output appropriate  -FU OR pathology  -OR Cultures NGTD  -Pain Control as needed  -Incentive Spirometry  -Medical management appreciated  -Pending discharge home with home PT per physical therapy recommendations. Likely D/c 5/20    [facility] - Spine Post Op Note Required Info   -------------------------------------------------------------------  s/p L2-S1 revision Lami/PSF on 5/14 POD #6    PRE-OP SYMPTOMS AND DIAGNOSIS:    73 yo male s/p revision laminectomy extension of spinal fusion at L23 and LLIF L23 from L3-S1 approximately 2 years ago, now Pseudarthrosis L5S1, with screw migration of Left L5, with foraminal stenos bilaterally, failed PT, NSAIDS, EDI, with inabilty to walk 5 feet, with cane, walker, has axial back pain with L>R radiculopathy, numbness and tingling.  Weakness of  TA, EHL, Peroneals 3/5 Left, 4/5 right.  Reduced L5 sensation L>R.  I reviewed surgical diagnosis and surgical treatment plan, which would be revision laminectomy with interbody at L5S1, with revision Posterior Spinal Fusion L2-Pelvis.  I reviewed all major risks, benefits, and complications with surgical intervention.    POST-OP SYMPTOMS:  Patient seen and examined at bedside. No acute complaints at this time. Experiencing some right lower extremity numbness that is unchanged. Ambulating with RW. HMV changed this morning.   General: NAD, resting comforatbly in bed  SPINE:  Dressing C/D/I  Dressing changed- distal incision with minimal bleeding expressed. No drainage,       Motor:                               L2        L3         L4           L5            S1  R            5/5       5/5        5/5          5/5          5/5  L             5/5       5/5       5/5           5/5          5/5      Sensory:               L2          L3         L4      L5       S1         (0=absent, 1=impaired, 2=normal, NT=not testable)  R         1           1           1       1           1  L          2          2          2        2           2    **upper and lower extremity sensory deficits unchanged from preoperative per patient             VITAL SIGNS   T(C): 36.7 (05-20-25 @ 04:56), Max: 36.8 (05-19-25 @ 20:00)  HR: 73 (05-20-25 @ 04:56) (65 - 78)  BP: 127/78 (05-20-25 @ 04:56) (115/72 - 160/83)  RR: 18 (05-20-25 @ 04:56) (18 - 18)  SpO2: 93% (05-20-25 @ 04:56) (93% - 95%)    05-18-25 @ 07:01  -  05-19-25 @ 07:00  --------------------------------------------------------  IN: 1280 mL / OUT: 1070 mL / NET: 210 mL    05-19-25 @ 07:01  -  05-20-25 @ 06:22  --------------------------------------------------------  IN: 1380 mL / OUT: 1955 mL / NET: -575 mL            NEW IMAGING AND RESULTS:    PT/OT:  -Last PT/OT session date:  5/17  -Progress: Pt juana 25min Physical therapy follow up session well. S/p L2-S1 poserior spinal fusion /c extension to pelvis L5-S1 and bilateral L5/S1 laminectomies,(5/14). Received sitting in chair +hemovac, +IVL. Pt A&Ox4, follows 100% of commands. Pt performed sit<>stand /c RW supervision. Pt c/o tightness in RLE which improved with standing marches. Pt amb 75'x2 /c RW supervision progressed to independently. Pt neg 2 steps +bilateral HR and supervison. Pt performed car transfer independently with demonstration and verbal instruction. Pt performed sit>supine /c CGA via log roll. Pt left in bed, lines in tact. Purposeful rounding reinforced. RN Mayito aware of session. Pt is cleared from PT standpoint for d/c home with assist from spouse and home PT.     CONSULTANTS/RECOMMENDATIONS: Plastics team looked at incision on 5/16/25 (Dr. Riley)--- healing well, nothing to do but daily dressing changes.       A/P:  74y m s/p revision L2-S1 posterior spinal fusion with extension to the pelvis and bilateral L5/S1 laminectomies, POD6. Recovering well.   -PT/OT   -WBAT BLLE  -Start Lovenox 40mg on POD2 (5/16/25), will be discharged on Xarelto  -HMV removed 5/20/25   -Continue ancef while drain in place, will discontinue upon removal of drain when output appropriate  -FU OR pathology  -OR Cultures NGTD  -Pain Control as needed  -Incentive Spirometry  -Medical management appreciated  -Pending discharge home with home PT per physical therapy recommendations. Likely D/c 5/20

## 2025-05-20 NOTE — PROGRESS NOTE ADULT - ASSESSMENT
73 yo male with Hypothyroidism, squamous cell CA, anxiety, chronic back pain, with spinal stenosis, s/p cervical laminectomy and fusion, and lumbar laminectomy last in 2013. Now with pain radiating to LLE and now using cane.  c/o difficulty walking.    S/p revision L2-S1 posterior spinal fusion with extension to the pelvis and bilateral L5/S1 laminectomies  Ortho spine f/up appreciated.  On tapering Prednisone.  Pain control with Oxy/Tramadol  Bowel regimen.  PT f/up noted  V Doppler BL LE: No DVT      Mood disorder:  Cw Seroquel/Bupropion    
73 yo male with Hypothyroidism, squamous cell CA, anxiety, chronic back pain, with spinal stenosis, s/p cervical laminectomy and fusion, and lumbar laminectomy last in 2013. Now with pain radiating to LLE and now using cane.  c/o difficulty walking.    S/p revision L2-S1 posterior spinal fusion with extension to the pelvis and bilateral L5/S1 laminectomies  Ortho spine f/up appreciated.  On tapering Prednisone.  Pain control with Oxy/Tramadol  Bowel regimen.  PT f/up noted  V Doppler BL LE: No DVT  On hemovac drain  DVT prophylaxis- SQ Lovenox    Mood disorder:  Cw Seroquel/Bupropion    
75 yo male with Hypothyroidism, squamous cell CA, anxiety, chronic back pain, with spinal stenosis, s/p cervical laminectomy and fusion, and lumbar laminectomy last in 2013. Now with pain radiating to LLE and now using cane.  c/o difficulty walking.    S/p revision L2-S1 posterior spinal fusion with extension to the pelvis and bilateral L5/S1 laminectomies  Ortho spine f/up appreciated.  On tapering Prednisone.  Pain control with Oxy/Tramadol  Bowel regimen.  PT f/up  V Doppler BL LE: No DVT  OOB    Mood disorder:  Cw Seroquel/Bupropion    
A/P:  74y m s/p revision L2-S1 posterior spinal fusion with extension to the pelvis and bilateral L5/S1 laminectomies, POD4. VSS. NAD.   -PT/OT   -WBAT BLLE  -Start Lovenox 40mg on POD2 (5/16/25), will be discharged on Xarelto  -Please document hemovac drain outputs every shift  -Continue ancef while drain in place, will discontinue upon removal of drain when output appropriate  -FU OR pathology  -OR Cultures NGTD  -Pain Control as needed  -Incentive Spirometry  -Medical management appreciated  -Pending discharge home with home PT per physical therapy recommendations  -Discussed with Dr Acuña who agrees with plan    Orthopaedic Surgery  [facility] [id number]  [facility]        [id number]  [facility]  [phone]/ [phone]  
73 yo male with Hypothyroidism, squamous cell CA, anxiety, chronic back pain, with spinal stenosis, s/p cervical laminectomy and fusion, and lumbar laminectomy last in 2013. Now with pain radiating to LLE and now using cane.  c/o difficulty walking.    S/p revision L2-S1 posterior spinal fusion with extension to the pelvis and bilateral L5/S1 laminectomies  Ortho spine f/up appreciated.  On tapering Prednisone.  Pain control with Oxy/Tramadol  Bowel regimen.  PT f/up  V Doppler BL LE: No DVT  OOB  DVT prophylaxis- SQ Lovenox    Mood disorder:  Cw Seroquel/Bupropion    
75 yo male with Hypothyroidism, squamous cell CA, anxiety, chronic back pain, with spinal stenosis, s/p cervical laminectomy and fusion, and lumbar laminectomy last in 2013. Now with pain radiating to LLE and now using cane.  c/o difficulty walking.    S/p revision L2-S1 posterior spinal fusion with extension to the pelvis and bilateral L5/S1 laminectomies  Ortho spine f/up appreciated.  On tapering Prednisone.  Pain control with Oxy/Tramadol  Bowel regimen.  PT f/up  V Doppler BL LE: No DVT  OOB  DVT prophylaxis- SQ Lovenox    Mood disorder:  Cw Seroquel/Bupropion    
A/P:  74y m s/p revision L2-S1 posterior spinal fusion with extension to the pelvis and bilateral L5/S1 laminectomies, POD3. VSS. NAD.   -PT/OT   -WBAT BLLE  -Start Lovenox 40mg on POD2 (5/16/25), will be discharged on Xarelto  -Please document hemovac drain outputs every shift  -Continue ancef while drain in place, will discontinue upon removal of drain when output appropriate  -FU OR pathology  -OR Cultures NGTD  -Pain Control as needed  -Incentive Spirometry  -Medical management appreciated  -Pending discharge home with home PT per physical therapy recommendations  -Discussed with Dr Acuña who agrees with plan    Nick Julian PA-C  Orthopedic Surgery  Pager #[phone]  
A/P:  74y m s/p revision L2-S1 posterior spinal fusion with extension to the pelvis and bilateral L5/S1 laminectomies, POD4. VSS. NAD.   -PT/OT   -WBAT BLLE  -Start Lovenox 40mg on POD2 (5/16/25), will be discharged on Xarelto  -Please document hemovac drain outputs every shift  -Continue ancef while drain in place, will discontinue upon removal of drain when output appropriate  -FU OR pathology  -OR Cultures NGTD  -Pain Control as needed  -Incentive Spirometry  -Medical management appreciated  -Pending discharge home with home PT per physical therapy recommendations  -Discussed with Dr Acuña who agrees with plan    Lb Gonzalez PA-C  Orthopedic Surgery  Pager #[phone]

## 2025-05-20 NOTE — PROGRESS NOTE ADULT - PROVIDER SPECIALTY LIST ADULT
Internal Medicine
Orthopedics
Internal Medicine
Internal Medicine

## 2025-05-20 NOTE — DISCHARGE NOTE NURSING/CASE MANAGEMENT/SOCIAL WORK - NSDCPEFALRISK_GEN_ALL_CORE
For information on Fall & Injury Prevention, visit: [email or web address] OR  [email or web address] OR  https://www.cdc.gov/steadi/patient.html

## 2025-05-20 NOTE — DISCHARGE NOTE NURSING/CASE MANAGEMENT/SOCIAL WORK - NSDCPEELIQUISDIET_GEN_ALL_CORE
Quick Note:    Wrist XR, G/E 3 views, left -FINAL-  Impression: Comminuted impacted and angulated distal radial fracture associated with approximately 45 degrees of dorsal angulation. Fracture lines extend into the radiocarpal joint.    Pt placed in bivalve splint. Pt to see Dr. Johnson on 1/20 for surgery. Results routed to PCP, Mariela Dolan MD.  ______  
Eat healthy foods you enjoy. Apixaban/Eliquis DOES NOT have a special diet. Limit your alcohol intake.

## 2025-05-21 ENCOUNTER — NON-APPOINTMENT (OUTPATIENT)
Age: 75
End: 2025-05-21

## 2025-05-22 RX ORDER — GABAPENTIN 300 MG/1
300 CAPSULE ORAL 3 TIMES DAILY
Qty: 270 | Refills: 0 | Status: ACTIVE | COMMUNITY
Start: 2025-05-22 | End: 1900-01-01

## 2025-05-24 ENCOUNTER — NON-APPOINTMENT (OUTPATIENT)
Age: 75
End: 2025-05-24

## 2025-05-31 ENCOUNTER — NON-APPOINTMENT (OUTPATIENT)
Age: 75
End: 2025-05-31

## 2025-06-02 ENCOUNTER — RX RENEWAL (OUTPATIENT)
Age: 75
End: 2025-06-02

## 2025-06-02 PROCEDURE — 87102 FUNGUS ISOLATION CULTURE: CPT

## 2025-06-02 PROCEDURE — C1769: CPT

## 2025-06-02 PROCEDURE — 87206 SMEAR FLUORESCENT/ACID STAI: CPT

## 2025-06-02 PROCEDURE — 84295 ASSAY OF SERUM SODIUM: CPT

## 2025-06-02 PROCEDURE — 87070 CULTURE OTHR SPECIMN AEROBIC: CPT

## 2025-06-02 PROCEDURE — 93970 EXTREMITY STUDY: CPT

## 2025-06-02 PROCEDURE — 97530 THERAPEUTIC ACTIVITIES: CPT

## 2025-06-02 PROCEDURE — 76000 FLUOROSCOPY <1 HR PHYS/QHP: CPT

## 2025-06-02 PROCEDURE — 88311 DECALCIFY TISSUE: CPT

## 2025-06-02 PROCEDURE — 82947 ASSAY GLUCOSE BLOOD QUANT: CPT

## 2025-06-02 PROCEDURE — 88304 TISSUE EXAM BY PATHOLOGIST: CPT

## 2025-06-02 PROCEDURE — 85018 HEMOGLOBIN: CPT

## 2025-06-02 PROCEDURE — 82803 BLOOD GASES ANY COMBINATION: CPT

## 2025-06-02 PROCEDURE — 97161 PT EVAL LOW COMPLEX 20 MIN: CPT

## 2025-06-02 PROCEDURE — 85025 COMPLETE CBC W/AUTO DIFF WBC: CPT

## 2025-06-02 PROCEDURE — C1713: CPT

## 2025-06-02 PROCEDURE — 97535 SELF CARE MNGMENT TRAINING: CPT

## 2025-06-02 PROCEDURE — C1889: CPT

## 2025-06-02 PROCEDURE — 80048 BASIC METABOLIC PNL TOTAL CA: CPT

## 2025-06-02 PROCEDURE — S2900: CPT

## 2025-06-02 PROCEDURE — 82330 ASSAY OF CALCIUM: CPT

## 2025-06-02 PROCEDURE — 82435 ASSAY OF BLOOD CHLORIDE: CPT

## 2025-06-02 PROCEDURE — 85014 HEMATOCRIT: CPT

## 2025-06-02 PROCEDURE — 87116 MYCOBACTERIA CULTURE: CPT

## 2025-06-02 PROCEDURE — 84132 ASSAY OF SERUM POTASSIUM: CPT

## 2025-06-02 PROCEDURE — 83605 ASSAY OF LACTIC ACID: CPT

## 2025-06-02 PROCEDURE — 87075 CULTR BACTERIA EXCEPT BLOOD: CPT

## 2025-06-02 PROCEDURE — 97166 OT EVAL MOD COMPLEX 45 MIN: CPT

## 2025-06-02 PROCEDURE — 97116 GAIT TRAINING THERAPY: CPT

## 2025-06-02 PROCEDURE — 82962 GLUCOSE BLOOD TEST: CPT

## 2025-06-05 RX ORDER — TRAMADOL HYDROCHLORIDE 50 MG/1
50 TABLET, COATED ORAL
Qty: 90 | Refills: 0 | Status: ACTIVE | COMMUNITY
Start: 2025-06-05 | End: 1900-01-01

## 2025-06-05 RX ORDER — CYCLOBENZAPRINE HYDROCHLORIDE 5 MG/1
5 TABLET, FILM COATED ORAL 3 TIMES DAILY
Qty: 90 | Refills: 0 | Status: ACTIVE | COMMUNITY
Start: 2025-06-05 | End: 1900-01-01

## 2025-06-05 RX ORDER — PANTOPRAZOLE 40 MG/1
40 TABLET, DELAYED RELEASE ORAL
Qty: 30 | Refills: 0 | Status: ACTIVE | COMMUNITY
Start: 2025-06-05 | End: 1900-01-01

## 2025-06-09 ENCOUNTER — APPOINTMENT (OUTPATIENT)
Dept: OTHER | Facility: CLINIC | Age: 75
End: 2025-06-09
Payer: COMMERCIAL

## 2025-06-09 ENCOUNTER — NON-APPOINTMENT (OUTPATIENT)
Age: 75
End: 2025-06-09

## 2025-06-09 PROCEDURE — 99213 OFFICE O/P EST LOW 20 MIN: CPT | Mod: 93

## 2025-06-09 NOTE — ASSESSMENT
[FreeTextEntry1] :  patient wiht Hx of  Thyroid cancer and underwent thyroidectomy.   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  Patient is to send in recent records of new skin cancers  SW to contact patient regarding information about

## 2025-06-09 NOTE — HISTORY OF PRESENT ILLNESS
[Home] : at home, [unfilled] , at the time of the visit. [Medical Office: ([facility])___] : at the medical office located in  [Telehealth (audio & video)] : This visit was provided via telehealth using real-time 2-way audio visual technology. [Verbal consent obtained from patient] : the patient, [unfilled] [FreeTextEntry1] :  Patient started as a telehealth and converted to phone due to technical issues . He would like to report new findings     Patient is certified for Thyroid Cancer and history of skin cancer   thyroid cancer  currently taking Synthroid 137 mcg He reported that he was diagnosed with  Thyroid cancer and underwent thyroidectomy.   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

## 2025-06-09 NOTE — REVIEW OF SYSTEMS
[Fever] : no fever [Chills] : no chills [Eye Pain] : no eye pain [Nosebleeds] : no nosebleeds [Red Eyes] : eyes not red [Nasal Discharge] : no nasal discharge [Chest Pain] : no chest pain [Palpitations] : no palpitations [Shortness Of Breath] : no shortness of breath [Cough] : no cough [SOB on Exertion] : shortness of breath during exertion [Wheezing] : no wheezing [Heartburn] : no heartburn [Joint Pain] : joint pain [Skin Lesions] : skin lesion

## 2025-06-09 NOTE — PAST MEDICAL HISTORY
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

## 2025-06-13 ENCOUNTER — APPOINTMENT (OUTPATIENT)
Dept: ORTHOPEDIC SURGERY | Facility: CLINIC | Age: 75
End: 2025-06-13

## 2025-06-13 VITALS — WEIGHT: 184 LBS | BODY MASS INDEX: 29.57 KG/M2 | HEIGHT: 66 IN

## 2025-06-13 PROCEDURE — 20974 ESTIM AID BONE HEALG N-INVAS: CPT | Mod: 58

## 2025-06-13 PROCEDURE — 72100 X-RAY EXAM L-S SPINE 2/3 VWS: CPT

## 2025-06-13 PROCEDURE — 99024 POSTOP FOLLOW-UP VISIT: CPT

## 2025-06-13 RX ORDER — GABAPENTIN 600 MG/1
600 TABLET, COATED ORAL 3 TIMES DAILY
Qty: 90 | Refills: 3 | Status: ACTIVE | COMMUNITY
Start: 2025-06-13 | End: 1900-01-01

## 2025-06-13 RX ORDER — TIZANIDINE 4 MG/1
4 TABLET ORAL 3 TIMES DAILY
Qty: 90 | Refills: 3 | Status: ACTIVE | COMMUNITY
Start: 2025-06-13 | End: 1900-01-01

## 2025-06-13 RX ORDER — PREDNISONE 10 MG/1
10 TABLET ORAL
Qty: 40 | Refills: 0 | Status: ACTIVE | COMMUNITY
Start: 2025-06-13 | End: 1900-01-01

## 2025-06-13 RX ORDER — GABAPENTIN 300 MG/1
300 CAPSULE ORAL
Qty: 90 | Refills: 3 | Status: ACTIVE | COMMUNITY
Start: 2025-06-13 | End: 1900-01-01

## 2025-06-13 RX ORDER — TRAMADOL HYDROCHLORIDE 50 MG/1
50 TABLET, COATED ORAL
Qty: 90 | Refills: 0 | Status: ACTIVE | COMMUNITY
Start: 2025-06-13 | End: 1900-01-01

## 2025-06-13 NOTE — HISTORY OF PRESENT ILLNESS
[___ Months Post Op] : [unfilled] months post op [Xray (Date:___)] : [unfilled] Xray -  [Clean/Dry/Intact] : clean, dry and intact [Hardware in Good Position] : hardware in good position [Fair Pain Control] : has fair pain control [No Sign of Infection] : is showing no signs of infection [No ADLs] : to avoid most activities of daily living [No Work] : not to work [No Housework] : not to do housework [Erythema] : not erythematous [Swelling] : not swollen [de-identified] : S/P revision laminectomy, facetectomy, lateral recess and foraminal decompression at L5-S1. Re-instrumentation L2-S1, L2-pelvis, separate instrumentation at L5-S1 with connecting rods. Arthrodesis L4-5, L5-S1, S1-2 anterior and sacroiliac joint on 5/14/25.  [de-identified] : S/P revision laminectomy, facetectomy, lateral recess and foraminal decompression at L5-S1. Re-instrumentation L2-S1, L2-pelvis, separate instrumentation at L5-S1 with connecting rods. Arthrodesis L4-5, L5-S1, S1-2 anterior and sacroiliac joint on 5/14/25. He complains of left leg pain radiating from the left buttock to the foot. Has mild incisional pain. He takes tramadol, prednisone, flexeril for the pain. He stopped the eliquis yesterday. Ambulating with a cane. [de-identified] : Patient has had recurrence of LLE radiculopathy after surgical intervention, recommend CT of L Spine, and MRI of L Spine with/without contrast. RTO for image review.

## 2025-06-14 LAB
CULTURE RESULTS: SIGNIFICANT CHANGE UP
SPECIMEN SOURCE: SIGNIFICANT CHANGE UP

## 2025-06-16 ENCOUNTER — INPATIENT (INPATIENT)
Facility: HOSPITAL | Age: 75
LOS: 4 days | Discharge: HOME | DRG: 552 | End: 2025-06-21
Attending: ORTHOPAEDIC SURGERY | Admitting: ORTHOPAEDIC SURGERY
Payer: MEDICARE

## 2025-06-16 VITALS
TEMPERATURE: 98 F | OXYGEN SATURATION: 97 % | HEART RATE: 78 BPM | RESPIRATION RATE: 18 BRPM | WEIGHT: 169.98 LBS | SYSTOLIC BLOOD PRESSURE: 159 MMHG | HEIGHT: 67 IN | DIASTOLIC BLOOD PRESSURE: 95 MMHG

## 2025-06-16 DIAGNOSIS — Z98.1 ARTHRODESIS STATUS: Chronic | ICD-10-CM

## 2025-06-16 DIAGNOSIS — E89.0 POSTPROCEDURAL HYPOTHYROIDISM: Chronic | ICD-10-CM

## 2025-06-16 DIAGNOSIS — Z98.890 OTHER SPECIFIED POSTPROCEDURAL STATES: Chronic | ICD-10-CM

## 2025-06-16 DIAGNOSIS — E89.2 POSTPROCEDURAL HYPOPARATHYROIDISM: Chronic | ICD-10-CM

## 2025-06-16 DIAGNOSIS — Z96.89 PRESENCE OF OTHER SPECIFIED FUNCTIONAL IMPLANTS: Chronic | ICD-10-CM

## 2025-06-16 DIAGNOSIS — Z90.79 ACQUIRED ABSENCE OF OTHER GENITAL ORGAN(S): Chronic | ICD-10-CM

## 2025-06-16 DIAGNOSIS — Z98.49 CATARACT EXTRACTION STATUS, UNSPECIFIED EYE: Chronic | ICD-10-CM

## 2025-06-16 DIAGNOSIS — Z90.49 ACQUIRED ABSENCE OF OTHER SPECIFIED PARTS OF DIGESTIVE TRACT: Chronic | ICD-10-CM

## 2025-06-16 PROCEDURE — 99285 EMERGENCY DEPT VISIT HI MDM: CPT | Mod: GC
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
## 2025-06-17 LAB
ALBUMIN SERPL ELPH-MCNC: 4.3 G/DL — SIGNIFICANT CHANGE UP (ref 3.3–5)
ALP SERPL-CCNC: 161 U/L — HIGH (ref 40–120)
ALT FLD-CCNC: 31 U/L — SIGNIFICANT CHANGE UP (ref 10–45)
ANION GAP SERPL CALC-SCNC: 16 MMOL/L — SIGNIFICANT CHANGE UP (ref 5–17)
APPEARANCE UR: CLEAR — SIGNIFICANT CHANGE UP
APTT BLD: 27.7 SEC — SIGNIFICANT CHANGE UP (ref 26.1–36.8)
AST SERPL-CCNC: 20 U/L — SIGNIFICANT CHANGE UP (ref 10–40)
BILIRUB SERPL-MCNC: 0.3 MG/DL — SIGNIFICANT CHANGE UP (ref 0.2–1.2)
BILIRUB UR-MCNC: NEGATIVE — SIGNIFICANT CHANGE UP
BLD GP AB SCN SERPL QL: NEGATIVE — SIGNIFICANT CHANGE UP
BUN SERPL-MCNC: 11 MG/DL — SIGNIFICANT CHANGE UP (ref 7–23)
CALCIUM SERPL-MCNC: 9.5 MG/DL — SIGNIFICANT CHANGE UP (ref 8.4–10.5)
CHLORIDE SERPL-SCNC: 100 MMOL/L — SIGNIFICANT CHANGE UP (ref 96–108)
CO2 SERPL-SCNC: 24 MMOL/L — SIGNIFICANT CHANGE UP (ref 22–31)
COLOR SPEC: YELLOW — SIGNIFICANT CHANGE UP
CREAT SERPL-MCNC: 0.75 MG/DL — SIGNIFICANT CHANGE UP (ref 0.5–1.3)
DIFF PNL FLD: NEGATIVE — SIGNIFICANT CHANGE UP
EGFR: 94 ML/MIN/1.73M2 — SIGNIFICANT CHANGE UP
EGFR: 94 ML/MIN/1.73M2 — SIGNIFICANT CHANGE UP
GLUCOSE SERPL-MCNC: 85 MG/DL — SIGNIFICANT CHANGE UP (ref 70–99)
GLUCOSE UR QL: NEGATIVE MG/DL — SIGNIFICANT CHANGE UP
HCT VFR BLD CALC: 41.6 % — SIGNIFICANT CHANGE UP (ref 39–50)
HGB BLD-MCNC: 13 G/DL — SIGNIFICANT CHANGE UP (ref 13–17)
INR BLD: 0.81 RATIO — LOW (ref 0.85–1.16)
KETONES UR QL: NEGATIVE MG/DL — SIGNIFICANT CHANGE UP
LEUKOCYTE ESTERASE UR-ACNC: NEGATIVE — SIGNIFICANT CHANGE UP
MCHC RBC-ENTMCNC: 26.7 PG — LOW (ref 27–34)
MCHC RBC-ENTMCNC: 31.3 G/DL — LOW (ref 32–36)
MCV RBC AUTO: 85.4 FL — SIGNIFICANT CHANGE UP (ref 80–100)
NITRITE UR-MCNC: NEGATIVE — SIGNIFICANT CHANGE UP
NRBC BLD AUTO-RTO: 0 /100 WBCS — SIGNIFICANT CHANGE UP (ref 0–0)
PH UR: 7.5 — SIGNIFICANT CHANGE UP (ref 5–8)
PLATELET # BLD AUTO: 276 K/UL — SIGNIFICANT CHANGE UP (ref 150–400)
POTASSIUM SERPL-MCNC: 3.8 MMOL/L — SIGNIFICANT CHANGE UP (ref 3.5–5.3)
POTASSIUM SERPL-SCNC: 3.8 MMOL/L — SIGNIFICANT CHANGE UP (ref 3.5–5.3)
PROT SERPL-MCNC: 8 G/DL — SIGNIFICANT CHANGE UP (ref 6–8.3)
PROT UR-MCNC: NEGATIVE MG/DL — SIGNIFICANT CHANGE UP
PROTHROM AB SERPL-ACNC: 9.3 SEC — LOW (ref 9.9–13.4)
RBC # BLD: 4.87 M/UL — SIGNIFICANT CHANGE UP (ref 4.2–5.8)
RBC # FLD: 15.8 % — HIGH (ref 10.3–14.5)
RH IG SCN BLD-IMP: POSITIVE — SIGNIFICANT CHANGE UP
SODIUM SERPL-SCNC: 140 MMOL/L — SIGNIFICANT CHANGE UP (ref 135–145)
SP GR SPEC: 1.01 — SIGNIFICANT CHANGE UP (ref 1–1.03)
UROBILINOGEN FLD QL: 0.2 MG/DL — SIGNIFICANT CHANGE UP (ref 0.2–1)
WBC # BLD: 10.09 K/UL — SIGNIFICANT CHANGE UP (ref 3.8–10.5)
WBC # FLD AUTO: 10.09 K/UL — SIGNIFICANT CHANGE UP (ref 3.8–10.5)

## 2025-06-17 PROCEDURE — 99223 1ST HOSP IP/OBS HIGH 75: CPT | Mod: 24

## 2025-06-17 PROCEDURE — 72197 MRI PELVIS W/O & W/DYE: CPT | Mod: 26

## 2025-06-17 PROCEDURE — 72158 MRI LUMBAR SPINE W/O & W/DYE: CPT | Mod: 26

## 2025-06-17 PROCEDURE — 72131 CT LUMBAR SPINE W/O DYE: CPT | Mod: 26

## 2025-06-17 PROCEDURE — 74176 CT ABD & PELVIS W/O CONTRAST: CPT | Mod: 26

## 2025-06-17 RX ORDER — ACETAMINOPHEN 500 MG/5ML
650 LIQUID (ML) ORAL EVERY 6 HOURS
Refills: 0 | Status: DISCONTINUED | OUTPATIENT
Start: 2025-06-17 | End: 2025-06-21

## 2025-06-17 RX ORDER — TRAMADOL HYDROCHLORIDE 50 MG/1
25 TABLET, FILM COATED ORAL EVERY 6 HOURS
Refills: 0 | Status: DISCONTINUED | OUTPATIENT
Start: 2025-06-17 | End: 2025-06-20

## 2025-06-17 RX ORDER — NALOXONE HYDROCHLORIDE 0.4 MG/ML
2 INJECTION, SOLUTION INTRAMUSCULAR; INTRAVENOUS; SUBCUTANEOUS
Refills: 0 | Status: DISCONTINUED | OUTPATIENT
Start: 2025-06-17 | End: 2025-06-21

## 2025-06-17 RX ORDER — LACTULOSE 10 G/15ML
10 SOLUTION ORAL
Refills: 0 | Status: DISCONTINUED | OUTPATIENT
Start: 2025-06-17 | End: 2025-06-21

## 2025-06-17 RX ORDER — BUPROPION HYDROBROMIDE 522 MG/1
300 TABLET, EXTENDED RELEASE ORAL DAILY
Refills: 0 | Status: DISCONTINUED | OUTPATIENT
Start: 2025-06-17 | End: 2025-06-21

## 2025-06-17 RX ORDER — CYCLOBENZAPRINE HYDROCHLORIDE 15 MG/1
5 CAPSULE, EXTENDED RELEASE ORAL THREE TIMES A DAY
Refills: 0 | Status: DISCONTINUED | OUTPATIENT
Start: 2025-06-17 | End: 2025-06-18

## 2025-06-17 RX ORDER — GABAPENTIN 400 MG/1
900 CAPSULE ORAL THREE TIMES A DAY
Refills: 0 | Status: DISCONTINUED | OUTPATIENT
Start: 2025-06-17 | End: 2025-06-18

## 2025-06-17 RX ORDER — TRAZODONE HCL 100 MG
100 TABLET ORAL AT BEDTIME
Refills: 0 | Status: DISCONTINUED | OUTPATIENT
Start: 2025-06-17 | End: 2025-06-21

## 2025-06-17 RX ORDER — SENNA 187 MG
2 TABLET ORAL AT BEDTIME
Refills: 0 | Status: DISCONTINUED | OUTPATIENT
Start: 2025-06-17 | End: 2025-06-21

## 2025-06-17 RX ORDER — POLYETHYLENE GLYCOL 3350 17 G/17G
17 POWDER, FOR SOLUTION ORAL AT BEDTIME
Refills: 0 | Status: DISCONTINUED | OUTPATIENT
Start: 2025-06-17 | End: 2025-06-21

## 2025-06-17 RX ORDER — OXYCODONE HYDROCHLORIDE 30 MG/1
5 TABLET ORAL EVERY 4 HOURS
Refills: 0 | Status: DISCONTINUED | OUTPATIENT
Start: 2025-06-17 | End: 2025-06-18

## 2025-06-17 RX ORDER — GABAPENTIN 400 MG/1
300 CAPSULE ORAL THREE TIMES A DAY
Refills: 0 | Status: DISCONTINUED | OUTPATIENT
Start: 2025-06-17 | End: 2025-06-17

## 2025-06-17 RX ORDER — LEVOTHYROXINE SODIUM 300 MCG
112 TABLET ORAL DAILY
Refills: 0 | Status: DISCONTINUED | OUTPATIENT
Start: 2025-06-17 | End: 2025-06-21

## 2025-06-17 RX ORDER — ALPRAZOLAM 0.5 MG
1 TABLET, EXTENDED RELEASE 24 HR ORAL EVERY 6 HOURS
Refills: 0 | Status: DISCONTINUED | OUTPATIENT
Start: 2025-06-17 | End: 2025-06-21

## 2025-06-17 RX ORDER — LIDOCAINE HYDROCHLORIDE 20 MG/ML
1 JELLY TOPICAL ONCE
Refills: 0 | Status: COMPLETED | OUTPATIENT
Start: 2025-06-17 | End: 2025-06-17

## 2025-06-17 RX ORDER — QUETIAPINE FUMARATE 25 MG/1
300 TABLET ORAL DAILY
Refills: 0 | Status: DISCONTINUED | OUTPATIENT
Start: 2025-06-17 | End: 2025-06-21

## 2025-06-17 RX ADMIN — CYCLOBENZAPRINE HYDROCHLORIDE 5 MILLIGRAM(S): 15 CAPSULE, EXTENDED RELEASE ORAL at 16:41

## 2025-06-17 RX ADMIN — OXYCODONE HYDROCHLORIDE 5 MILLIGRAM(S): 30 TABLET ORAL at 08:13

## 2025-06-17 RX ADMIN — Medication 650 MILLIGRAM(S): at 12:30

## 2025-06-17 RX ADMIN — BUPROPION HYDROBROMIDE 300 MILLIGRAM(S): 522 TABLET, EXTENDED RELEASE ORAL at 11:33

## 2025-06-17 RX ADMIN — QUETIAPINE FUMARATE 300 MILLIGRAM(S): 25 TABLET ORAL at 20:40

## 2025-06-17 RX ADMIN — Medication 40 MILLIGRAM(S): at 05:12

## 2025-06-17 RX ADMIN — Medication 10 MILLIGRAM(S): at 20:41

## 2025-06-17 RX ADMIN — Medication 10 MILLIGRAM(S): at 17:21

## 2025-06-17 RX ADMIN — OXYCODONE HYDROCHLORIDE 5 MILLIGRAM(S): 30 TABLET ORAL at 17:40

## 2025-06-17 RX ADMIN — Medication 10 MILLIGRAM(S): at 09:17

## 2025-06-17 RX ADMIN — Medication 650 MILLIGRAM(S): at 18:20

## 2025-06-17 RX ADMIN — Medication 50 MILLILITER(S): at 08:15

## 2025-06-17 RX ADMIN — Medication 10 MILLIGRAM(S): at 12:25

## 2025-06-17 RX ADMIN — OXYCODONE HYDROCHLORIDE 5 MILLIGRAM(S): 30 TABLET ORAL at 16:41

## 2025-06-17 RX ADMIN — Medication 650 MILLIGRAM(S): at 11:33

## 2025-06-17 RX ADMIN — GABAPENTIN 300 MILLIGRAM(S): 400 CAPSULE ORAL at 08:13

## 2025-06-17 RX ADMIN — Medication 112 MICROGRAM(S): at 08:13

## 2025-06-17 RX ADMIN — Medication 650 MILLIGRAM(S): at 05:13

## 2025-06-17 RX ADMIN — Medication 650 MILLIGRAM(S): at 17:21

## 2025-06-17 RX ADMIN — Medication 1 MILLIGRAM(S): at 09:21

## 2025-06-17 RX ADMIN — Medication 4 MILLIGRAM(S): at 03:17

## 2025-06-17 RX ADMIN — GABAPENTIN 900 MILLIGRAM(S): 400 CAPSULE ORAL at 14:43

## 2025-06-17 RX ADMIN — OXYCODONE HYDROCHLORIDE 5 MILLIGRAM(S): 30 TABLET ORAL at 09:13

## 2025-06-17 RX ADMIN — Medication 500 MILLIGRAM(S): at 11:33

## 2025-06-17 NOTE — ED PROVIDER NOTE - NS ED ROS FT

## 2025-06-17 NOTE — H&P ADULT - ASSESSMENT
A/P:  74y m s/p revision L2-S1 posterior spinal fusion with extension to the pelvis and bilateral L5/S1 laminectomies on 5/14/25 with Dr. Acuña    -PT/OT   -WBAT BLLE  -FU CT Abdomen/pelvis  -FU MR LSp/Pelvis w/wo under sedation. Of note, patient's allergery is to iodine based contrast. Patient does not endorse an allergy to non-iodine based contrast of gadolinium.  -Recommend medicine consult, Dr. Valdivia aware  -Recommend neurology consult  -DVT ppx SCDs, pending further plan  -Will discuss with Dr. Acuña and will advise if plan changes.

## 2025-06-17 NOTE — ED ADULT NURSE NOTE - NSSUHOSCREENINGYN_ED_ALL_ED
Please call the patient regarding her abnormal result.  Potassium is low at 3.1.  Recommend patient take potassium supplement 20 mEq p.o. daily.  A prescription was called into her pharmacy.  We will need to repeat the BMP in 2 weeks.  Sodium is improved at 130.  
Yes - the patient is able to be screened

## 2025-06-17 NOTE — ED ADULT NURSE NOTE - OBJECTIVE STATEMENT
75 y/o M, presents to the ED for back pain, states he had laminectomy 5/14, since then, having ongoing back pain, worsening over the last few days. pt states pain radiates down LLE. pt denies NV, cp, sob, weakness, numbness / tingling, fecal / urinary incontinence. pt is walking ind without difficulty. IV placed. pt is A&ox4, speech is clear, following commands. 73 y/o M, presents to the ED for back pain, states he had laminectomy 5/14, since then, having ongoing back pain, worsening over the last few days. pt states pain radiates down LLE, further endorses LLE numbness. pt denies NV, cp, sob, weakness, fecal / urinary incontinence. pt is walking ind without difficulty. IV placed. pt is A&ox4, speech is clear, following commands.

## 2025-06-17 NOTE — ED PROVIDER NOTE - CLINICAL SUMMARY MEDICAL DECISION MAKING FREE TEXT BOX
74M recent back surgery, presenting with right lower extremity numbness, shooting pain numbness tingling down the legs.  Denies red flag symptoms including bowel bladder incontinence, saddle anesthesia, weight loss, weakness, trauma.  Known to Ortho Dr. Acuña, planning to admit for MRI

## 2025-06-17 NOTE — ED PROVIDER NOTE - ATTENDING CONTRIBUTION TO CARE
I was the supervising attending. I have independently seen face-to-face and examined the patient with the resident. I have reviewed the history and physical and discussed the MDM with the resident. I agree with the assessment and plan as presented unless otherwise documented as follows:    74M hx SCC, GERD, BPH, HLD, chronic back pain w/ spinal stenosis s/p prior cervical & lumbar laminectomy, s/p recent L5-S1 revision laminectomy on 5/14/25 w/ Dr. Acuña, presenting w/ back pain. Endorses progressively worsening left low back pain since surgery w/ radiation down the back of the LLE. Worse w/ certain movements and w/ ambulation. Notes mildly decreased sensation throughout the LLE. Has been taking multimodal pain medication as recommended by Dr. Acuña w/o significant relief. Sent in to the hospital for MRI and pain control. Otherwise denies recent fevers/chills, falls/injuries, extremity weakness, bowel/bladder incontinence or retention, abdominal pain, dysuria/hematuria or urgency/frequency, abdominal pain, diarrhea, nausea/vomiting, CP/SOB. Appears well, AOx3, not in acute distress. Lungs CTABL, normal heart sounds, abdomen soft and NT/ND. Low back incision well-healed, no overlying skin erythema or fluctuance/induration. TTP to L low back and midline sacrum. No saddle anesthesia. 5/5 strength all 4 extremities. Subjectively endorsed decreased sensation throughout LLE including foot but sensation still preserved. Orthopedics resident at bedside upon initial evaluation, requesting labs, UA, CTAP to evaluate for alternate non-spinal pathology, likely admission to their service. -Maggi Christian MD (Attending)

## 2025-06-17 NOTE — H&P ADULT - HISTORY OF PRESENT ILLNESS
s/p L2-S1 revision Lami/PSF on 5/14/25    PRE-OP SYMPTOMS AND DIAGNOSIS:    75 yo male s/p revision laminectomy extension of spinal fusion at L23 and LLIF L23 from L3-S1 approximately 2 years ago, now Pseudarthrosis L5S1, with screw migration of Left L5, with foraminal stenos bilaterally, failed PT, NSAIDS, EDI, with inabilty to walk 5 feet, with cane, walker, has axial back pain with L>R radiculopathy, numbness and tingling.  Weakness of  TA, EHL, Peroneals 3/5 Left, 4/5 right.  Reduced L5 sensation L>R.  I reviewed surgical diagnosis and surgical treatment plan, which would be revision laminectomy with interbody at L5S1, with revision Posterior Spinal Fusion L2-Pelvis.  I reviewed all major risks, benefits, and complications with surgical intervention.    POST-OP SYMPTOMS:  Patient seen and examined at bedside.  Experiencing  right lower extremity numbness and shooting pain/numbness/tingling down left leg. Patient has been ambulating with a cane. Patient denies bowel/bladder incontinence, saddle anesthesia, weight loss, weakness. Denies trauma.         General: NAD, resting comfortably in bed  SPINE:  LSp/pelvis incision is healing well c/d/i      Motor:                               L2        L3         L4           L5            S1  R            5/5       5/5        5/5          5/5          5/5  L             4+/5       5/5       5/5           5/5          5/5      Sensory:               L2          L3         L4      L5       S1         (0=absent, 1=impaired, 2=normal, NT=not testable)  R         1           1           1       1           1  L          2          2          2        2           2    **upper and lower extremity sensory deficits unchanged from preoperative per patient

## 2025-06-17 NOTE — H&P ADULT - NSICDXPASTMEDICALHX_GEN_ALL_CORE_FT
PAST MEDICAL HISTORY:  Anxiety and depression     BPH (benign prostatic hyperplasia)     GERD (gastroesophageal reflux disease)     Herpes zoster     History of chronic back pain     History of spinal stenosis     HLD (hyperlipidemia)     Peyronie's disease     SCC (squamous cell carcinoma) of nose    Stomach ulcer

## 2025-06-17 NOTE — ED PROVIDER NOTE - PHYSICAL EXAMINATION
Physical Exam:   General: NAD  HEENT: PERRL, normal sclera/conjunctiva  Neck: Supple  Lungs: CTA bilaterally  Heart: RRR, normal S1S2, no murmurs/rubs/gallops  Abdomen: Soft, ND/NT  Extremities: no edema  Skin: Warm, well-perfused  Neuro: A&O x3, no focal deficits  MSK: midline back pain Physical Exam:   General: NAD  HEENT: PERRL, normal sclera/conjunctiva  Neck: Supple  Lungs: CTA bilaterally  Heart: RRR, normal S1S2, no murmurs/rubs/gallops  Abdomen: Soft, ND/NT  Extremities: no edema  Skin: Warm, well-perfused  Neuro: A&O x3  MSK: midline back pain

## 2025-06-18 ENCOUNTER — NON-APPOINTMENT (OUTPATIENT)
Age: 75
End: 2025-06-18

## 2025-06-18 PROCEDURE — 99221 1ST HOSP IP/OBS SF/LOW 40: CPT

## 2025-06-18 PROCEDURE — 99233 SBSQ HOSP IP/OBS HIGH 50: CPT | Mod: 24

## 2025-06-18 PROCEDURE — 99223 1ST HOSP IP/OBS HIGH 75: CPT | Mod: GC

## 2025-06-18 RX ORDER — METHOCARBAMOL 500 MG/1
500 TABLET, FILM COATED ORAL EVERY 8 HOURS
Refills: 0 | Status: DISCONTINUED | OUTPATIENT
Start: 2025-06-18 | End: 2025-06-21

## 2025-06-18 RX ORDER — METHYLPREDNISOLONE ACETATE 80 MG/ML
40 INJECTION, SUSPENSION INTRA-ARTICULAR; INTRALESIONAL; INTRAMUSCULAR; SOFT TISSUE ONCE
Refills: 0 | Status: COMPLETED | OUTPATIENT
Start: 2025-06-18 | End: 2025-06-18

## 2025-06-18 RX ORDER — PREGABALIN 50 MG/1
100 CAPSULE ORAL
Refills: 0 | Status: DISCONTINUED | OUTPATIENT
Start: 2025-06-18 | End: 2025-06-21

## 2025-06-18 RX ORDER — NALOXONE HYDROCHLORIDE 0.4 MG/ML
0.2 INJECTION, SOLUTION INTRAMUSCULAR; INTRAVENOUS; SUBCUTANEOUS
Refills: 0 | Status: DISCONTINUED | OUTPATIENT
Start: 2025-06-18 | End: 2025-06-21

## 2025-06-18 RX ORDER — LIDOCAINE HCL/PF 10 MG/ML
3 VIAL (ML) INJECTION ONCE
Refills: 0 | Status: COMPLETED | OUTPATIENT
Start: 2025-06-18 | End: 2025-06-18

## 2025-06-18 RX ORDER — HYDROMORPHONE/SOD CHLOR,ISO/PF 2 MG/10 ML
2 SYRINGE (ML) INJECTION EVERY 4 HOURS
Refills: 0 | Status: DISCONTINUED | OUTPATIENT
Start: 2025-06-18 | End: 2025-06-19

## 2025-06-18 RX ORDER — GABAPENTIN 400 MG/1
800 CAPSULE ORAL
Refills: 0 | Status: DISCONTINUED | OUTPATIENT
Start: 2025-06-18 | End: 2025-06-21

## 2025-06-18 RX ADMIN — Medication 650 MILLIGRAM(S): at 05:49

## 2025-06-18 RX ADMIN — METHYLPREDNISOLONE ACETATE 40 MILLIGRAM(S): 80 INJECTION, SUSPENSION INTRA-ARTICULAR; INTRALESIONAL; INTRAMUSCULAR; SOFT TISSUE at 18:08

## 2025-06-18 RX ADMIN — Medication 100 MILLIGRAM(S): at 00:05

## 2025-06-18 RX ADMIN — Medication 50 MILLILITER(S): at 02:17

## 2025-06-18 RX ADMIN — Medication 650 MILLIGRAM(S): at 12:13

## 2025-06-18 RX ADMIN — OXYCODONE HYDROCHLORIDE 5 MILLIGRAM(S): 30 TABLET ORAL at 10:00

## 2025-06-18 RX ADMIN — Medication 650 MILLIGRAM(S): at 00:05

## 2025-06-18 RX ADMIN — Medication 2 TABLET(S): at 00:05

## 2025-06-18 RX ADMIN — Medication 15 MILLIGRAM(S): at 14:51

## 2025-06-18 RX ADMIN — METHOCARBAMOL 500 MILLIGRAM(S): 500 TABLET, FILM COATED ORAL at 21:09

## 2025-06-18 RX ADMIN — Medication 112 MICROGRAM(S): at 05:49

## 2025-06-18 RX ADMIN — Medication 10 MILLIGRAM(S): at 11:13

## 2025-06-18 RX ADMIN — GABAPENTIN 900 MILLIGRAM(S): 400 CAPSULE ORAL at 13:21

## 2025-06-18 RX ADMIN — Medication 650 MILLIGRAM(S): at 01:00

## 2025-06-18 RX ADMIN — CYCLOBENZAPRINE HYDROCHLORIDE 5 MILLIGRAM(S): 15 CAPSULE, EXTENDED RELEASE ORAL at 11:20

## 2025-06-18 RX ADMIN — Medication 15 MILLIGRAM(S): at 13:51

## 2025-06-18 RX ADMIN — Medication 3 MILLILITER(S): at 18:08

## 2025-06-18 RX ADMIN — Medication 2 MILLIGRAM(S): at 18:11

## 2025-06-18 RX ADMIN — Medication 650 MILLIGRAM(S): at 17:10

## 2025-06-18 RX ADMIN — Medication 10 MILLIGRAM(S): at 05:48

## 2025-06-18 RX ADMIN — Medication 650 MILLIGRAM(S): at 23:49

## 2025-06-18 RX ADMIN — Medication 500 MILLILITER(S): at 02:22

## 2025-06-18 RX ADMIN — Medication 500 MILLIGRAM(S): at 11:12

## 2025-06-18 RX ADMIN — Medication 650 MILLIGRAM(S): at 11:13

## 2025-06-18 RX ADMIN — OXYCODONE HYDROCHLORIDE 5 MILLIGRAM(S): 30 TABLET ORAL at 11:00

## 2025-06-18 RX ADMIN — Medication 10 MILLIGRAM(S): at 17:10

## 2025-06-18 RX ADMIN — Medication 2 TABLET(S): at 21:07

## 2025-06-18 RX ADMIN — Medication 100 MILLIGRAM(S): at 21:09

## 2025-06-18 RX ADMIN — Medication 1 MILLIGRAM(S): at 17:43

## 2025-06-18 RX ADMIN — OXYCODONE HYDROCHLORIDE 5 MILLIGRAM(S): 30 TABLET ORAL at 15:19

## 2025-06-18 RX ADMIN — Medication 2 MILLIGRAM(S): at 17:11

## 2025-06-18 RX ADMIN — OXYCODONE HYDROCHLORIDE 5 MILLIGRAM(S): 30 TABLET ORAL at 14:19

## 2025-06-18 RX ADMIN — Medication 650 MILLIGRAM(S): at 06:30

## 2025-06-18 RX ADMIN — GABAPENTIN 800 MILLIGRAM(S): 400 CAPSULE ORAL at 22:15

## 2025-06-18 RX ADMIN — Medication 650 MILLIGRAM(S): at 18:09

## 2025-06-18 RX ADMIN — GABAPENTIN 900 MILLIGRAM(S): 400 CAPSULE ORAL at 00:06

## 2025-06-18 RX ADMIN — GABAPENTIN 900 MILLIGRAM(S): 400 CAPSULE ORAL at 05:49

## 2025-06-18 RX ADMIN — BUPROPION HYDROBROMIDE 300 MILLIGRAM(S): 522 TABLET, EXTENDED RELEASE ORAL at 11:13

## 2025-06-18 RX ADMIN — Medication 10 MILLIGRAM(S): at 21:06

## 2025-06-18 RX ADMIN — Medication 40 MILLIGRAM(S): at 05:49

## 2025-06-18 RX ADMIN — QUETIAPINE FUMARATE 300 MILLIGRAM(S): 25 TABLET ORAL at 22:54

## 2025-06-18 RX ADMIN — Medication 2 MILLIGRAM(S): at 23:50

## 2025-06-18 RX ADMIN — Medication 1 MILLIGRAM(S): at 11:20

## 2025-06-18 NOTE — CONSULT NOTE ADULT - ATTENDING COMMENTS
During my evaluation, patient's mother was present and I appreciate her involvement and support.    I agree with above exam, assessment and plan unless noted below,  Ms. 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 Neurology is consulted for rule out SI radiculopathy.  On exam patient reported decrease sensation on left side of body including face, arm and leg.    Patient will benefit from further work up to rule out treatable etiologies. Further workup and management will be guided by pending results.  MRI brain but patient refused for MRI brain.  Outpatient EMG and NCV  Continue pain management.   Continue medical management, neuro- check and fall precaution.  GI and DVT prophylaxis.  I discussed the diagnosis, treatment plan and prognosis with the patient.  All questions and concerns were addressed. The patient demonstrated good understanding of the treatment plan.  My cumulative time spent on the care of this patient was 85 minutes.  If you have any further questions, please do not hesitate to contact our team.  Thank you for allowing us to participate in this patient care. During my evaluation, patient's partner was present and I appreciate that.    I agree with above exam, assessment and plan unless noted below,  Ms. 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 Neurology is consulted for rule out SI radiculopathy.  On exam patient reported decrease sensation on left side of body including face, arm and leg.    MRI L spine did not show any acute pathology but consistent with  L2-S1 laminectomies with transpedicular screws and connecting rods. Small postoperative collection.  Patient will benefit from further work up to rule out treatable etiologies. Further workup and management will be guided by pending results.  MRI brain but patient refused for MRI brain.  Outpatient EMG and NCV  Continue pain management.   Continue medical management, neuro- check and fall precaution.  GI and DVT prophylaxis.  PT and OT evaluation  I discussed the diagnosis, treatment plan and prognosis with the patient.  All questions and concerns were addressed. The patient demonstrated good understanding of the treatment plan.  My cumulative time spent on the care of this patient was 85 minutes.  If you have any further questions, please do not hesitate to contact our team.  Thank you for allowing us to participate in this patient care.

## 2025-06-18 NOTE — PROGRESS NOTE ADULT - SUBJECTIVE AND OBJECTIVE BOX
Patient is a 74y old  Male who presents with a chief complaint of     SUBJECTIVE / OVERNIGHT EVENTS:    Events noted.  CONSTITUTIONAL: Lt leg pain  RESPIRATORY: No cough, wheezing,  No shortness of breath  CARDIOVASCULAR: No chest pain, palpitations, dizziness, or leg swelling  GASTROINTESTINAL: No abdominal or epigastric pain.   NEUROLOGICAL: No headache    MEDICATIONS  (STANDING):  acetaminophen     Tablet .. 650 milliGRAM(s) Oral every 6 hours  buPROPion XL (24-Hour) . 300 milliGRAM(s) Oral daily  dicyclomine 10 milliGRAM(s) Oral four times a day before meals  gabapentin 900 milliGRAM(s) Oral three times a day  levothyroxine 112 MICROGram(s) Oral daily  morphine ER Tablet 15 milliGRAM(s) Oral every 8 hours  pantoprazole    Tablet 40 milliGRAM(s) Oral before breakfast  QUEtiapine 300 milliGRAM(s) Oral daily  senna 2 Tablet(s) Oral at bedtime  sodium chloride 0.9%. 1000 milliLiter(s) (50 mL/Hr) IV Continuous <Continuous>  traZODone 100 milliGRAM(s) Oral at bedtime  valACYclovir 500 milliGRAM(s) Oral daily    MEDICATIONS  (PRN):  ALPRAZolam 1 milliGRAM(s) Oral every 6 hours PRN anxiety  cyclobenzaprine 5 milliGRAM(s) Oral three times a day PRN Muscle Spasm  lactulose Syrup 10 Gram(s) Oral two times a day PRN constipation  naloxone 1 mG/mL Injection for Intranasal Use 2 milliGRAM(s) IntraNasal every 3 minutes PRN as needed for antidote  oxyCODONE    IR 5 milliGRAM(s) Oral every 4 hours PRN Severe Pain (7 - 10)  polyethylene glycol 3350 17 Gram(s) Oral at bedtime PRN for constipation  traMADol 25 milliGRAM(s) Oral every 6 hours PRN Moderate Pain (4 - 6)        CAPILLARY BLOOD GLUCOSE        I&O's Summary    2025 07:01  -  2025 07:00  --------------------------------------------------------  IN: 200 mL / OUT: 1350 mL / NET: -1150 mL        T(C): 36.4 (25 @ 09:21), Max: 37.1 (25 @ 00:44)  HR: 61 (25 @ 09:21) (61 - 75)  BP: 126/81 (25 @ 09:21) (90/52 - 127/64)  RR: 18 (25 @ 09:21) (18 - 18)  SpO2: 94% (25 @ 09:21) (94% - 96%)    PHYSICAL EXAM:  GENERAL: NAD  NECK: Supple, No JVD  CHEST/LUNG: Clear to auscultation bilaterally; No wheezing.  HEART: Regular rate and rhythm; No murmurs, rubs, or gallops  ABDOMEN: Soft, Nontender, Nondistended; Bowel sounds present  EXTREMITIES:   No edema  NEUROLOGY: AAO X 3      LABS:                        13.0   10.09 )-----------( 276      ( 2025 03:23 )             41.6         140  |  100  |  11  ----------------------------<  85  3.8   |  24  |  0.75    Ca    9.5      2025 03:23    TPro  8.0  /  Alb  4.3  /  TBili  0.3  /  DBili  x   /  AST  20  /  ALT  31  /  AlkPhos  161[H]  -17    PT/INR - ( 2025 03:23 )   PT: 9.3 sec;   INR: 0.81 ratio         PTT - ( 2025 03:23 )  PTT:27.7 sec      Urinalysis Basic - ( 2025 05:06 )    Color: Yellow / Appearance: Clear / S.014 / pH: x  Gluc: x / Ketone: x  / Bili: Negative / Urobili: 0.2 mg/dL   Blood: x / Protein: Negative mg/dL / Nitrite: Negative   Leuk Esterase: Negative / RBC: x / WBC x   Sq Epi: x / Non Sq Epi: x / Bacteria: x      CAPILLARY BLOOD GLUCOSE            RADIOLOGY & ADDITIONAL TESTS:    Imaging Personally Reviewed:    Consultant(s) Notes Reviewed:      Care Discussed with Consultants/Other Providers:    John Valdivia MD, CMD, FACP    [id number] [place] [address]  Office Tel: [phone]

## 2025-06-18 NOTE — PATIENT PROFILE ADULT - FALL HARM RISK - RISK INTERVENTIONS
Assistance OOB with selected safe patient handling equipment/Assistance with ambulation/Communicate Fall Risk and Risk Factors to all staff, patient, and family/Monitor gait and stability/Reinforce activity limits and safety measures with patient and family/Sit up slowly, dangle for a short time, stand at bedside before walking/Use of alarms - bed, chair and/or voice tab/Visual Cue: Yellow wristband/Bed in lowest position, wheels locked, appropriate side rails in place/Call bell, personal items and telephone in reach/Instruct patient to call for assistance before getting out of bed or chair/Non-slip footwear when patient is out of bed/[place] to call system/Physically safe environment - no spills, clutter or unnecessary equipment/Purposeful Proactive Rounding/Room/bathroom lighting operational, light cord in reach

## 2025-06-18 NOTE — CONSULT NOTE ADULT - SUBJECTIVE AND OBJECTIVE BOX
Patient is a 74y old  Male who presents with a chief complaint of Lt leg pain and numbness.    HPI:  s/p L2-S1 revision Lami/PSF on 25    PRE-OP SYMPTOMS AND DIAGNOSIS:    73 yo male s/p revision laminectomy extension of spinal fusion at L23 and LLIF L23 from L3-S1 approximately 2 years ago, now Pseudarthrosis L5S1, with screw migration of Left L5, with foraminal stenos bilaterally, failed PT, NSAIDS, EDI, with inabilty to walk 5 feet, with cane, walker, has axial back pain with L>R radiculopathy, numbness and tingling.  Weakness of  TA, EHL, Peroneals 3/5 Left, 4/5 right.  Reduced L5 sensation L>R.  I reviewed surgical diagnosis and surgical treatment plan, which would be revision laminectomy with interbody at L5S1, with revision Posterior Spinal Fusion L2-Pelvis.  I reviewed all major risks, benefits, and complications with surgical intervention.    POST-OP SYMPTOMS:  Patient seen and examined at bedside.  Experiencing  right lower extremity numbness and shooting pain/numbness/tingling down left leg. Patient has been ambulating with a cane. Patient denies bowel/bladder incontinence, saddle anesthesia, weight loss, weakness. Denies trauma.         PAST MEDICAL & SURGICAL HISTORY:  Anxiety and depression      BPH (benign prostatic hyperplasia)      SCC (squamous cell carcinoma)  of nose      GERD (gastroesophageal reflux disease)      HLD (hyperlipidemia)      Peyronie's disease      Stomach ulcer      History of spinal stenosis      History of chronic back pain      Herpes zoster      S/P colectomy  1997      History of ankle surgery        H/O foot surgery  right       S/P repair of hydrocele  right       S/P meniscectomy  left 10/2019      S/P parathyroidectomy  right      S/P thyroidectomy  bilateral 2006      H/O ventral hernia repair  umbilical/incisional repair 2016      H/O prostatectomy  2008      S/P cervical spinal fusion  2007      S/P lumbar spinal fusion        H/O squamous cell carcinoma excision  nose (~5-6 yrs ago) and forehead       S/P cataract surgery  bilateral      S/P insertion of spinal cord stimulator  right 2021, 2020          Review of Systems: Uncomfortable due to pain in left leg.  CONSTITUTIONAL: No fever,  or fatigue  NECK: No pain or stiffness  RESPIRATORY: No cough,  No shortness of breath  CARDIOVASCULAR: No chest pain, palpitations, dizziness, or leg swelling  GASTROINTESTINAL: No abdominal  pain. No nausea, vomiting.            Allergies    sulfa drugs (Hives)  contrast media (iodine-based) (Vomiting)  Flagyl (Hives)    Intolerances    propofol (Vomiting; Nausea)      Social History:     FAMILY HISTORY:      MEDICATIONS  (STANDING):  acetaminophen     Tablet .. 650 milliGRAM(s) Oral every 6 hours  buPROPion XL (24-Hour) . 300 milliGRAM(s) Oral daily  dicyclomine 10 milliGRAM(s) Oral four times a day before meals  gabapentin 300 milliGRAM(s) Oral three times a day  levothyroxine 112 MICROGram(s) Oral daily  pantoprazole    Tablet 40 milliGRAM(s) Oral before breakfast  QUEtiapine 300 milliGRAM(s) Oral daily  senna 2 Tablet(s) Oral at bedtime  sodium chloride 0.9%. 1000 milliLiter(s) (50 mL/Hr) IV Continuous <Continuous>  traZODone 100 milliGRAM(s) Oral at bedtime  valACYclovir 500 milliGRAM(s) Oral daily    MEDICATIONS  (PRN):  ALPRAZolam 1 milliGRAM(s) Oral every 6 hours PRN anxiety  cyclobenzaprine 5 milliGRAM(s) Oral three times a day PRN Muscle Spasm  lactulose Syrup 10 Gram(s) Oral two times a day PRN constipation  naloxone 1 mG/mL Injection for Intranasal Use 2 milliGRAM(s) IntraNasal every 3 minutes PRN as needed for antidote  oxyCODONE    IR 5 milliGRAM(s) Oral every 4 hours PRN Severe Pain (7 - 10)  polyethylene glycol 3350 17 Gram(s) Oral at bedtime PRN for constipation  traMADol 25 milliGRAM(s) Oral every 6 hours PRN Moderate Pain (4 - 6)      CAPILLARY BLOOD GLUCOSE        I&O's Summary      T(C): 36.4 (25 @ 07:44), Max: 36.6 (25 @ 04:56)  HR: 66 (25 @ 07:44) (65 - 78)  BP: 167/89 (25 @ 07:44) (148/84 - 167/89)  RR: 18 (25 @ 07:44) (16 - 18)  SpO2: 96% (25 @ 07:44) (95% - 97%)    PHYSICAL EXAM:    GENERAL: NAD  NECK: Supple, No JVD  CHEST/LUNG: Clear to auscultation bilaterally; No wheezing.  HEART: Regular rate and rhythm; No murmurs, rubs, or gallops  ABDOMEN: Soft, Nontender, Nondistended; Bowel sounds present  EXTREMITIES:  2+ Peripheral Pulses, No edema  NEUROLOGY: AAOx 3      LABS:                        13.0   10.09 )-----------( 276      ( 2025 03:23 )             41.6     -    140  |  100  |  11  ----------------------------<  85  3.8   |  24  |  0.75    Ca    9.5      2025 03:23    TPro  8.0  /  Alb  4.3  /  TBili  0.3  /  DBili  x   /  AST  20  /  ALT  31  /  AlkPhos  161[H]  06-17    PT/INR - ( 2025 03:23 )   PT: 9.3 sec;   INR: 0.81 ratio         PTT - ( 2025 03:23 )  PTT:27.7 sec      Urinalysis Basic - ( 2025 05:06 )    Color: Yellow / Appearance: Clear / S.014 / pH: x  Gluc: x / Ketone: x  / Bili: Negative / Urobili: 0.2 mg/dL   Blood: x / Protein: Negative mg/dL / Nitrite: Negative   Leuk Esterase: Negative / RBC: x / WBC x   Sq Epi: x / Non Sq Epi: x / Bacteria: x      CAPILLARY BLOOD GLUCOSE            RADIOLOGY & ADDITIONAL TESTS:    Imaging Personally Reviewed:    Consultant(s) Notes Reviewed:      Care Discussed with Consultants/Other Providers:    Thanks for consult. Will follow.
Chief Complaint:  Patient is a 74y old  Male who presents with a chief complaint of     HPI:  75 yo male s/p revision laminectomy extension of spinal fusion at L23 and LLIF L23 from L3-S1 approximately 2 years ago. Then pseudarthrosis L5-S1, with screw migration of Left L5, with foraminal stenos bilaterally, failed PT, NSAIDS, EDI, with inability to walk 5 feet, with cane, walker, has axial back pain with L>R radiculopathy, numbness and tingling.  Weakness of  TA, EHL, Peroneals 3/5 Left, 4/5 right.  Reduced L5 sensation L>R.  S/P L2-S1 revision Lami/PSF on 5/14/25.  Now admitted with sciatica.      Current Pain Score: 5/10    Current out- patient pain regimen: tramadol 50 mg TID    Out Patient Pain Management provider: MANASA Li    [facility] Prescription Monitoring Program: Reference #[id number]      PAST MEDICAL & SURGICAL HISTORY:  Anxiety and depression      BPH (benign prostatic hyperplasia)      SCC (squamous cell carcinoma)  of nose      GERD (gastroesophageal reflux disease)      HLD (hyperlipidemia)      Peyronie's disease      Stomach ulcer      History of spinal stenosis      History of chronic back pain      Herpes zoster      S/P colectomy  1997      History of ankle surgery  1992      H/O foot surgery  right 2014      S/P repair of hydrocele  right 2010      S/P meniscectomy  left 10/2019      S/P parathyroidectomy  right      S/P thyroidectomy  bilateral 2006      H/O ventral hernia repair  umbilical/incisional repair 2016      H/O prostatectomy  2008      S/P cervical spinal fusion  2007      S/P lumbar spinal fusion  2008      H/O squamous cell carcinoma excision  nose (~5-6 yrs ago) and forehead 2021      S/P cataract surgery  bilateral      S/P insertion of spinal cord stimulator  right 7/2021, 1st 2020      SOCIAL HISTORY:  Tobacco Use: denies  Alcohol Use: denies  Recreational Marijuana: denies  Illicit Drug Use: denies    Opioid Risk Tool (ORT-OUD) Score: low      Allergies    sulfa drugs (Hives)  contrast media (iodine-based) (Vomiting)  Flagyl (Hives)    Intolerances    propofol (Vomiting; Nausea)      REVIEW OF SYSTEMS:  CONSTITUTIONAL: No fever, weight loss, fatigue, falls  NEURO: No headaches, memory loss, tremors, dizziness or blurred vision  RESP: No shortness of breath, cough  CV: No chest pain, palpitations  GI: No abdominal pain, nausea, vomiting, diarrhea, constipation   : No urinary incontinence/retention, dysuria  MSK: No joint pain; + low back and LLE pain; no upper or lower motor strength weakness; no saddle anesthesia   SKIN: No itching, burning, rashes   PSYCHIATRIC: No depression, anxiety, mood swings, or difficulty sleeping      MEDICATIONS  (STANDING):  acetaminophen     Tablet .. 650 milliGRAM(s) Oral every 6 hours  buPROPion XL (24-Hour) . 300 milliGRAM(s) Oral daily  dicyclomine 10 milliGRAM(s) Oral four times a day before meals  gabapentin 800 milliGRAM(s) Oral <User Schedule>  levothyroxine 112 MICROGram(s) Oral daily  lidocaine 1% Injectable 3 milliLiter(s) Local Injection once  methocarbamol 500 milliGRAM(s) Oral every 8 hours  methylPREDNISolone acetate Injectable 40 milliGRAM(s) IntraMuscular once  pantoprazole    Tablet 40 milliGRAM(s) Oral before breakfast  pregabalin 100 milliGRAM(s) Oral <User Schedule>  QUEtiapine 300 milliGRAM(s) Oral daily  senna 2 Tablet(s) Oral at bedtime  sodium chloride 0.9%. 1000 milliLiter(s) (50 mL/Hr) IV Continuous <Continuous>  traZODone 100 milliGRAM(s) Oral at bedtime  valACYclovir 500 milliGRAM(s) Oral daily    MEDICATIONS  (PRN):  ALPRAZolam 1 milliGRAM(s) Oral every 6 hours PRN anxiety  HYDROmorphone   Tablet 2 milliGRAM(s) Oral every 4 hours PRN Severe Pain (7 - 10)  lactulose Syrup 10 Gram(s) Oral two times a day PRN constipation  naloxone 1 mG/mL Injection for Intranasal Use 2 milliGRAM(s) IntraNasal every 3 minutes PRN as needed for antidote  naloxone Injectable 0.2 milliGRAM(s) IV Push every 3 minutes PRN respiratory depression/ suspsected opioid OD  polyethylene glycol 3350 17 Gram(s) Oral at bedtime PRN for constipation  traMADol 25 milliGRAM(s) Oral every 6 hours PRN Moderate Pain (4 - 6)      PHYSICAL EXAM  GENERAL: seen at bedside; NAD; well developed, well groomed; no signs of toxicity  HEENT: head atraumatic, normocephalic; anicteric; speech clear, circumstantial   NEURO: A + O X 3; good concentration; Cranial Nerves II- XII intact; sensation diminished LLE all dermatomes  GI: abdomen soft, non distended; + bowel sounds; last BM Tuesday  : voiding  EXTREMITIES/ PV: MORENO; 2+ peripheral pulses; no cyanosis, edema or clubbing  MUSCULOSKELETAL: motor strength 5/5 RLE and 4/5 LLE; OOB with cane  SKIN: no rashes, lesions    PSYCH: affect flat; good eye contact; no signs of depression or anxiety  Vital Signs:  T(C): 36.7 (06-18-25 @ 16:49)  HR: 68 (06-18-25 @ 16:49)  BP: 116/72 (06-18-25 @ 16:49)  RR: 18 (06-18-25 @ 16:49)  SpO2: 94% (06-18-25 @ 16:49)    Pertinent labs/radiology:                        13.0   10.09 )-----------( 276      ( 17 Jun 2025 03:23 )             41.6   06-17    140  |  100  |  11  ----------------------------<  85  3.8   |  24  |  0.75    Ca    9.5      17 Jun 2025 03:23    TPro  8.0  /  Alb  4.3  /  TBili  0.3  /  DBili  x   /  AST  20  /  ALT  31  /  AlkPhos  161[H]  06-17    < from: MR Lumbar Spine w/wo IV Cont (06.17.25 @ 23:37) >  IMPRESSION:  L1-1-2 interposition metallic graft. L2-S1 laminectomies with   transpedicular screws and connecting rods. No significant spinal   stenosis. Small postoperative collection.           
Neurology - Consult Note    -  Spectra: [phone] ([facility]), [phone] ([facility])  -    HPI: Patient RAVINDER ANDINO is a 74y (1950) man with a PMHx significant for hypothyroidism, skin basal cell and squamous cell carcinoma s/p MOHS, anxiety, chronic back pain, spinal stenosis, s/p cervical laminectomy and fusion, multiple lumbar surgeries most recently revision L2-S1 posterior spinal fusion with extension to the pelvis and bilateral L5/S1 laminectomies on 5/14/2025, initially tolerated well however c/b worsening LLE radiculopathy necessitating return to the hospital for further evaluation. Neurology is consulted for radiculopathy. Patient denies new weakness, saddle anesthesia, bladder/bowel symptoms. Endorses shooting pain down left leg from buttocks to toes even at rest. Minimally responds to gabapentin, flexeril, tramadol. He is able to ambulate with a cane. He underwent MRI L spine and pelvis this evening, pending read.      Review of Systems:    CONSTITUTIONAL: No fevers or chills  EYES AND ENT: No visual changes or no throat pain   NECK: No pain or stiffness  RESPIRATORY: No hemoptysis or shortness of breath  CARDIOVASCULAR: No chest pain or palpitations  GASTROINTESTINAL: No melena or hematochezia  GENITOURINARY: No dysuria or hematuria  NEUROLOGICAL: +As stated in HPI above  SKIN: No itching, burning, rashes, or lesions   All other review of systems is negative unless indicated above.    Allergies:  sulfa drugs (Hives)  propofol (Vomiting; Nausea)  contrast media (iodine-based) (Vomiting)  Flagyl (Hives)      PMHx/PSHx/Family Hx: As above, otherwise see below   Anxiety and depression    BPH (benign prostatic hyperplasia)    SCC (squamous cell carcinoma)    GERD (gastroesophageal reflux disease)    HLD (hyperlipidemia)    Peyronie's disease    Stomach ulcer    History of spinal stenosis    History of chronic back pain    Herpes zoster        Social Hx:  No current use of tobacco, alcohol, or illicit drugs    Medications:  MEDICATIONS  (STANDING):  acetaminophen     Tablet .. 650 milliGRAM(s) Oral every 6 hours  buPROPion XL (24-Hour) . 300 milliGRAM(s) Oral daily  dicyclomine 10 milliGRAM(s) Oral four times a day before meals  gabapentin 900 milliGRAM(s) Oral three times a day  levothyroxine 112 MICROGram(s) Oral daily  pantoprazole    Tablet 40 milliGRAM(s) Oral before breakfast  QUEtiapine 300 milliGRAM(s) Oral daily  senna 2 Tablet(s) Oral at bedtime  sodium chloride 0.9%. 1000 milliLiter(s) (50 mL/Hr) IV Continuous <Continuous>  traZODone 100 milliGRAM(s) Oral at bedtime  valACYclovir 500 milliGRAM(s) Oral daily    MEDICATIONS  (PRN):  ALPRAZolam 1 milliGRAM(s) Oral every 6 hours PRN anxiety  cyclobenzaprine 5 milliGRAM(s) Oral three times a day PRN Muscle Spasm  lactulose Syrup 10 Gram(s) Oral two times a day PRN constipation  naloxone 1 mG/mL Injection for Intranasal Use 2 milliGRAM(s) IntraNasal every 3 minutes PRN as needed for antidote  oxyCODONE    IR 5 milliGRAM(s) Oral every 4 hours PRN Severe Pain (7 - 10)  polyethylene glycol 3350 17 Gram(s) Oral at bedtime PRN for constipation  traMADol 25 milliGRAM(s) Oral every 6 hours PRN Moderate Pain (4 - 6)      Vitals:  T(C): 37.1 (06-18-25 @ 00:44), Max: 37.1 (06-18-25 @ 00:44)  HR: 75 (06-18-25 @ 00:44) (64 - 75)  BP: 90/52 (06-18-25 @ 02:06) (90/52 - 167/89)  RR: 18 (06-18-25 @ 00:44) (16 - 18)  SpO2: 94% (06-18-25 @ 00:44) (94% - 96%)    Physical Examination:   General - NAD, pleasant, cooperative   Neurologic Exam:  Mental status - Awake, Alert, Oriented to person, place, and time. Speech fluent, repetition and naming intact. Follows simple and complex commands.     Cranial nerves:  CN II: Visual fields are full to confrontation. Pupils are 4 mm and briskly reactive to light.   CN III, IV, VI: EOMI, no nystagmus, no ptosis  CN V: Decreased sensation L V1-V3  CN VII: Face is symmetric with normal eye closure and smile.  CN VII: Hearing is normal to rubbing fingers  CN IX, X: Palate elevates symmetrically. Phonation is normal.  CN XI: Shoulder shrug intact  CN XII: Tongue is midline with normal movements and no atrophy.    Motor - Normal bulk and tone throughout. No pronator drift of out-stretched arms.  Strength testing            Deltoid      Biceps      Triceps     Wrist Extension    Wrist Flexion     Interossei         R            5              5               5                5                         5                    5                 5  L             5              5               5                5                        5                    5                 5              Hip Flexion    Hip Extension    Knee Flexion    Knee Extension    Dorsiflexion    Plantar Flexion  R              5                     5                      5                      5                     5                          5  L              4+                    4+                   4+                    5                     5                          5              Toe eversion    Toe inversion     EHL extension  R              5                      5                      5  L               5                      5                      5    Sensation - Decreased to light touch in LLE    DTR's -             Biceps      Triceps     Brachioradialis      Patellar    Ankle    Toes/plantar response  R             1+             1+                1+                 1+          1+                 Down  L              1+            1+                 1+                 1+          1+                 Down    Coordination - There is no dysmetria on finger-to-nose.   Gait and station - Deferred due to patient safety/fall risk    Labs:                        13.0   10.09 )-----------( 276      ( 17 Jun 2025 03:23 )             41.6     06-17    140  |  100  |  11  ----------------------------<  85  3.8   |  24  |  0.75    Ca    9.5      17 Jun 2025 03:23    TPro  8.0  /  Alb  4.3  /  TBili  0.3  /  DBili  x   /  AST  20  /  ALT  31  /  AlkPhos  161[H]  06-17    CAPILLARY BLOOD GLUCOSE        LIVER FUNCTIONS - ( 17 Jun 2025 03:23 )  Alb: 4.3 g/dL / Pro: 8.0 g/dL / ALK PHOS: 161 U/L / ALT: 31 U/L / AST: 20 U/L / GGT: x             Urinalysis with Rflx Culture (collected 17 Jun 2025 05:06)      PT/INR - ( 17 Jun 2025 03:23 )   PT: 9.3 sec;   INR: 0.81 ratio         PTT - ( 17 Jun 2025 03:23 )  PTT:27.7 sec      Radiology:  < from: CT Lumbar Spine Reform No Cont (06.17.25 @ 03:52) >  LUMBAR SPINE:    HARDWARE: Status post bilateral chata and pedicle screw fixation of L2 to   sacrum with bilateral transpedicular screws L2-S1 and bilateral   sacroiliac screws connected by posterior spinal rods. Intervertebral   device L2-3. Associated streak artifact mildly compromises evaluation.   There is been revision of the right L5 screw compared with 5/10/2025,   removal of the previous bilateral S1 screws, and new bilateral S1 screws   and bilateral sacroiliac screws. Vacated screw tracks are noted at the S1   level. Status post posterior decompression L2-L5. There is posterior   osseous bridging extending from L2 to S1. Corticated lucency is again   noted to the left L3 and L4 transverse processes.  VERTEBRAE: Diffuse osseous demineralization. Stable in height. No   evidence of acute fractureMultilevel degenerative changes including   marginal osteophytes.  ALIGNMENT: Stable alignment without evidence of traumatic listhesis.   Unchanged retrolisthesis of L2 on L3.  DISCS: Examination is limited by streak artifact from hardware. The   limitations of CT technique and streak artifact. No definite high-grade  spinal canal stenosis is seen. Multilevel bilateral osseous neural   foraminal narrowing, most pronounced at L2-3 and L5-S1.    MISCELLANEOUS:  None.      IMPRESSION:  *  No acute pathology within the abdomen or pelvis.  *  *  No evidence of acute fracture or traumatic listhesis. Degenerative and   postoperative changes.    < end of copied text >

## 2025-06-18 NOTE — PROVIDER CONTACT NOTE (OTHER) - ACTION/TREATMENT ORDERED:
Danitza Melo MD notified. recheck BP in 1h. give bolus of fluid. no further orders @ this time. Danitza Melo MD notified. recheck BP in 1h. no further orders @ this time.

## 2025-06-18 NOTE — PROVIDER CONTACT NOTE (OTHER) - ASSESSMENT
Pt A&Ox4, BP 93/51, HR 75, VS otherwise stable. No c/o dizziness, headache, chest pain or SOB. Pt arrived back to 7T from MRI. Pt received seroquel prior to MRI, pt stated he "fell asleep during MRI". Pt alr\ert and oriented on arrival to floor. Bedtime medication given as ordered. Pt asking for xanax at this time. RN educated pt on blood pressure and risks of receiving xanax at this time.

## 2025-06-18 NOTE — PROGRESS NOTE ADULT - SUBJECTIVE AND OBJECTIVE BOX
[facility] - Spine Post Op Note Required Info   -------------------------------------------------------------------     s/p L2-S1 revision Lami/PSF on 5/14/25    PRE-OP SYMPTOMS AND DIAGNOSIS:    75 yo male s/p revision laminectomy extension of spinal fusion at L23 and LLIF L23 from L3-S1 approximately 2 years ago, now Pseudarthrosis L5S1, with screw migration of Left L5, with foraminal stenos bilaterally, failed PT, NSAIDS, EDI, with inabilty to walk 5 feet, with cane, walker, has axial back pain with L>R radiculopathy, numbness and tingling.  Weakness of  TA, EHL, Peroneals 3/5 Left, 4/5 right.  Reduced L5 sensation L>R.  I reviewed surgical diagnosis and surgical treatment plan, which would be revision laminectomy with interbody at L5S1, with revision Posterior Spinal Fusion L2-Pelvis.  I reviewed all major risks, benefits, and complications with surgical intervention.    POST-OP SYMPTOMS:  Patient seen and examined at bedside.  Pain is controlled and he has been ambulating independently.   Patient denies bowel/bladder incontinence, and saddle anesthesia.    Medical issues/events Overnight:   No chest pain, SOB, dizziness. SPB 90s overnight and managed with fluids.       PHYSICAL EXAM  Gen: Lying in bed, NAD  Resp: No increased WOB  Spine:    incision is well c/d/i    Motor:                   C5                C6              C7               C8           T1   R            5/5                5/5            5/5             5/5          5/5  L             5/5               5/5             5/5             5/5          5/5                L2             L3             L4               L5                    S1  R         5/5             5/5             5/5              5/5                   5/5  L          4+/5          4+/5           4+/5             4+/5           4+/5    Sensory:            C5         C6         C7      C8       T1        (0=absent, 1=impaired, 2=normal, NT=not testable)  R         2            2           2        2         2  L          2            2           2        2         2               L2          L3         L4      L5       S1         (0=absent, 1=impaired, 2=normal, NT=not testable)  R         2            2            2        2        2  L          1            1           1        1         1    LEs:  Calves soft, no TTP b/l  WWP b/l     VITAL SIGNS   T(C): 36.6 (06-18-25 @ 05:03), Max: 37.1 (06-18-25 @ 00:44)  HR: 64 (06-18-25 @ 05:03) (64 - 75)  BP: 107/65 (06-18-25 @ 05:03) (90/52 - 167/89)  RR: 18 (06-18-25 @ 05:03) (18 - 18)  SpO2: 96% (06-18-25 @ 05:03) (94% - 96%)    06-17-25 @ 07:01  -  06-18-25 @ 06:36  --------------------------------------------------------  IN: 200 mL / OUT: 1350 mL / NET: -1150 mL        LABS: including new culture/gram stain results     NEW IMAGING AND RESULTS:    PT/OT:  -Last PT/OT session date:  -Progress: (steps, sat in chair, OOB)  -Dispo recommendations:      CONSULTANTS/RECOMMENDATIONS:  Neurology: Follow up read of MRI L spine, Continue symptomatic management with gabapentin, flexeril, tramadol      A/P:74y m s/p revision L2-S1 posterior spinal fusion with extension to the pelvis and bilateral L5/S1 laminectomies on 5/14/25 with Dr. Acuña    -PT/OT   -WBAT BLLE  -CT Abdomen/pelvis completed  -FU MR LSp/Pelvis w/wo under sedation. Of note, patient's allergy is to iodine based contrast. Patient does not endorse an allergy to non-iodine based contrast of gadolinium.  - Appreciate neurology  and medicine consult  -DVT ppx SCDs, pending further plan   [facility] - Spine Post Op Note Required Info   -------------------------------------------------------------------     s/p L2-S1 revision Lami/PSF on 5/14/25    PRE-OP SYMPTOMS AND DIAGNOSIS:    75 yo male s/p revision laminectomy extension of spinal fusion at L23 and LLIF L23 from L3-S1 approximately 2 years ago, now Pseudarthrosis L5S1, with screw migration of Left L5, with foraminal stenos bilaterally, failed PT, NSAIDS, EDI, with inabilty to walk 5 feet, with cane, walker, has axial back pain with L>R radiculopathy, numbness and tingling.  Weakness of  TA, EHL, Peroneals 3/5 Left, 4/5 right.  Reduced L5 sensation L>R.  I reviewed surgical diagnosis and surgical treatment plan, which would be revision laminectomy with interbody at L5S1, with revision Posterior Spinal Fusion L2-Pelvis.  I reviewed all major risks, benefits, and complications with surgical intervention.    POST-OP SYMPTOMS:  Patient seen and examined at bedside.  Pain is controlled and he has been ambulating independently.   Patient denies bowel/bladder incontinence, and saddle anesthesia. LLE radiculopathy new onset last few weeks. Patient admitted for pain control management.     Medical issues/events Overnight:   No chest pain, SOB, dizziness. SPB 90s overnight and managed with fluids.       PHYSICAL EXAM  Gen: Lying in bed, NAD  Resp: No increased WOB  Spine:    incision is well c/d/i    Motor:                   C5                C6              C7               C8           T1   R            5/5                5/5            5/5             5/5          5/5  L             5/5               5/5             5/5             5/5          5/5                L2             L3             L4               L5                    S1  R         5/5             5/5             5/5              5/5                   5/5  L          4+/5          4+/5           4+/5             4+/5           4+/5    Sensory:            C5         C6         C7      C8       T1        (0=absent, 1=impaired, 2=normal, NT=not testable)  R         2            2           2        2         2  L          2            2           2        2         2               L2          L3         L4      L5       S1         (0=absent, 1=impaired, 2=normal, NT=not testable)  R         2            2            2        2        2  L          1            1           1        1         1    LEs:  Calves soft, no TTP b/l  WWP b/l     VITAL SIGNS   T(C): 36.6 (06-18-25 @ 05:03), Max: 37.1 (06-18-25 @ 00:44)  HR: 64 (06-18-25 @ 05:03) (64 - 75)  BP: 107/65 (06-18-25 @ 05:03) (90/52 - 167/89)  RR: 18 (06-18-25 @ 05:03) (18 - 18)  SpO2: 96% (06-18-25 @ 05:03) (94% - 96%)    06-17-25 @ 07:01  -  06-18-25 @ 06:36  --------------------------------------------------------  IN: 200 mL / OUT: 1350 mL / NET: -1150 mL        LABS: including new culture/gram stain results     NEW IMAGING AND RESULTS:    PT/OT:  -Last PT/OT session date:  -Progress: (steps, sat in chair, OOB)  -Dispo recommendations:      CONSULTANTS/RECOMMENDATIONS:  Neurology: Follow up read of MRI L spine, Continue symptomatic management with gabapentin, flexeril, tramadol      A/P:74y m s/p revision L2-S1 posterior spinal fusion with extension to the pelvis and bilateral L5/S1 laminectomies on 5/14/25 with Dr. Acuña    -PT/OT   -WBAT BLLE  -CT Abdomen/pelvis completed  -FU MR LSp/Pelvis w/wo under sedation. Of note, patient's allergy is to iodine based contrast. Patient does not endorse an allergy to non-iodine based contrast of gadolinium.  - Appreciate neurology  and medicine consult  -DVT ppx SCDs, pending further plan

## 2025-06-18 NOTE — CONSULT NOTE ADULT - ASSESSMENT
A/P:  74y m s/p revision L2-S1 posterior spinal fusion with extension to the pelvis and bilateral L5/S1 laminectomies on 5/14/25, now presents with LLE shooting pain.    Ortho spine f/up appreciated.  Pain control with Oxy IR  MRI L spine  Anxiety/Depression- Cw Welbutrin/Seroquel/Trazodone/Xanax  Bowel regimen    Based on patient's RCRI score of 0,  would consider him mild to moderate risk for planned procedure. However, patient is optimized from medical perspective to proceed.  No evidence of clinical HF or unstable cardiac symptoms.    
Patient RAVINDER ANDINO is a 74y (1950) man with a PMHx significant for hypothyroidism, skin basal cell and squamous cell carcinoma s/p MOHS, anxiety, chronic back pain, spinal stenosis, s/p cervical laminectomy and fusion, multiple lumbar surgeries most recently revision L2-S1 posterior spinal fusion with extension to the pelvis and bilateral L5/S1 laminectomies on 5/14/2025, initially tolerated well however c/b worsening LLE radiculopathy necessitating return to the hospital for further evaluation. Neurology is consulted for radiculopathy. Patient denies new weakness, saddle anesthesia, bladder/bowel symptoms. Endorses shooting pain down left leg from buttocks to toes even at rest. Minimally responds to gabapentin, flexeril, tramadol. He is able to ambulate with a cane. He underwent MRI L spine and pelvis this evening, pending read. Exam with mild L hip flexion and knee extension weakness. Otherwise normal strength. Decreased sensation L V1-V3. Decreased sensation LLE.    Impression: LLE radiculopathy due with known history spinal stenosis and multiple lumbar spine surgeries including recent extensive surgery x1 month. Clinical symptoms not concerning for acute cord compression.    Recommendations:  [] Follow up read of MRI L spine  [] Continue symptomatic management with gabapentin, flexeril, tramadol  [] Rest of care per ortho spine team    Case and plan not finalized until attending attestation  
73 yo male s/p revision laminectomy extension of spinal fusion at L23 and LLIF L23 from L3-S1 approximately 2 years ago. Then pseudarthrosis L5-S1, with screw migration of Left L5, with foraminal stenos bilaterally, failed PT, NSAIDS, EDI, with inability to walk 5 feet, with cane, walker, has axial back pain with L>R radiculopathy, numbness and tingling.  Weakness of  TA, EHL, Peroneals 3/5 Left, 4/5 right.  Reduced L5 sensation L>R.  S/P L2-S1 revision Lami/PSF on 5/14/25.  Now admitted with sciatica.    Current out- patient pain regimen: tramadol 50 mg TID  Out Patient Pain Management provider: MANASA Li  [facility] Prescription Monitoring Program: Reference #[id number]    Current Pain Score: 5/10    Pt with acute on chronic pain.    No effect with tramadol or Oxy IR, no effect with morphine in the past.  States he has been on neurontin for years and feels it has no effect.  Discussed medication rotation.    Start lyrica 100 mg QAM and Q noon, with neurontin 800 mg at HS- current CrCl is 91.5.  Discontinue flexeril and start robaxin 500 mg Q 8 ours.  Trial PO dilaudid 2 mg Q 4 hours PRN.    Monitor for sedation and respiratory depression.  Bowel regimen PRN.   Incentive spirometer.  OOB/ PT per primary team.    Please prescribe a narcan rescue kit on discharge (naloxone 4 mg/ 0.1 ml nasal spray- 1 spray Q 2-3 minutes alternating between nostrils).   Follow up with MANASA Li for continued pain management after discharge.      Total time of encounter:   45  minutes      [facility]  [phone]

## 2025-06-19 PROCEDURE — 99233 SBSQ HOSP IP/OBS HIGH 50: CPT | Mod: 24

## 2025-06-19 PROCEDURE — 99232 SBSQ HOSP IP/OBS MODERATE 35: CPT

## 2025-06-19 RX ORDER — HYDROMORPHONE/SOD CHLOR,ISO/PF 2 MG/10 ML
4 SYRINGE (ML) INJECTION EVERY 4 HOURS
Refills: 0 | Status: DISCONTINUED | OUTPATIENT
Start: 2025-06-19 | End: 2025-06-21

## 2025-06-19 RX ORDER — PREDNISONE 20 MG/1
40 TABLET ORAL DAILY
Refills: 0 | Status: DISCONTINUED | OUTPATIENT
Start: 2025-06-20 | End: 2025-06-20

## 2025-06-19 RX ORDER — DEXAMETHASONE 0.5 MG/1
8 TABLET ORAL EVERY 8 HOURS
Refills: 0 | Status: COMPLETED | OUTPATIENT
Start: 2025-06-19 | End: 2025-06-20

## 2025-06-19 RX ORDER — PREDNISONE 20 MG/1
TABLET ORAL
Refills: 0 | Status: DISCONTINUED | OUTPATIENT
Start: 2025-06-20 | End: 2025-06-20

## 2025-06-19 RX ADMIN — Medication 650 MILLIGRAM(S): at 18:09

## 2025-06-19 RX ADMIN — METHOCARBAMOL 500 MILLIGRAM(S): 500 TABLET, FILM COATED ORAL at 21:16

## 2025-06-19 RX ADMIN — PREGABALIN 100 MILLIGRAM(S): 50 CAPSULE ORAL at 06:37

## 2025-06-19 RX ADMIN — Medication 100 MILLIGRAM(S): at 23:09

## 2025-06-19 RX ADMIN — Medication 4 MILLIGRAM(S): at 22:08

## 2025-06-19 RX ADMIN — Medication 650 MILLIGRAM(S): at 00:30

## 2025-06-19 RX ADMIN — Medication 650 MILLIGRAM(S): at 11:11

## 2025-06-19 RX ADMIN — Medication 1 MILLIGRAM(S): at 13:31

## 2025-06-19 RX ADMIN — Medication 2 MILLIGRAM(S): at 00:30

## 2025-06-19 RX ADMIN — METHOCARBAMOL 500 MILLIGRAM(S): 500 TABLET, FILM COATED ORAL at 05:18

## 2025-06-19 RX ADMIN — Medication 4 MILLIGRAM(S): at 18:06

## 2025-06-19 RX ADMIN — Medication 2 MILLIGRAM(S): at 05:00

## 2025-06-19 RX ADMIN — Medication 500 MILLIGRAM(S): at 11:11

## 2025-06-19 RX ADMIN — DEXAMETHASONE 101.6 MILLIGRAM(S): 0.5 TABLET ORAL at 14:05

## 2025-06-19 RX ADMIN — Medication 2 MILLIGRAM(S): at 14:04

## 2025-06-19 RX ADMIN — QUETIAPINE FUMARATE 300 MILLIGRAM(S): 25 TABLET ORAL at 23:10

## 2025-06-19 RX ADMIN — Medication 1 MILLIGRAM(S): at 23:09

## 2025-06-19 RX ADMIN — Medication 2 MILLIGRAM(S): at 15:04

## 2025-06-19 RX ADMIN — Medication 2 MILLIGRAM(S): at 04:01

## 2025-06-19 RX ADMIN — Medication 2 TABLET(S): at 21:15

## 2025-06-19 RX ADMIN — PREGABALIN 100 MILLIGRAM(S): 50 CAPSULE ORAL at 13:29

## 2025-06-19 RX ADMIN — Medication 4 MILLIGRAM(S): at 17:06

## 2025-06-19 RX ADMIN — Medication 10 MILLIGRAM(S): at 04:01

## 2025-06-19 RX ADMIN — Medication 1 MILLIGRAM(S): at 05:18

## 2025-06-19 RX ADMIN — Medication 10 MILLIGRAM(S): at 21:17

## 2025-06-19 RX ADMIN — Medication 2 MILLIGRAM(S): at 10:42

## 2025-06-19 RX ADMIN — Medication 650 MILLIGRAM(S): at 23:10

## 2025-06-19 RX ADMIN — DEXAMETHASONE 101.6 MILLIGRAM(S): 0.5 TABLET ORAL at 21:24

## 2025-06-19 RX ADMIN — GABAPENTIN 800 MILLIGRAM(S): 400 CAPSULE ORAL at 21:16

## 2025-06-19 RX ADMIN — Medication 650 MILLIGRAM(S): at 06:37

## 2025-06-19 RX ADMIN — METHOCARBAMOL 500 MILLIGRAM(S): 500 TABLET, FILM COATED ORAL at 13:29

## 2025-06-19 RX ADMIN — Medication 10 MILLIGRAM(S): at 11:11

## 2025-06-19 RX ADMIN — Medication 2 MILLIGRAM(S): at 09:42

## 2025-06-19 RX ADMIN — Medication 650 MILLIGRAM(S): at 12:11

## 2025-06-19 RX ADMIN — Medication 4 MILLIGRAM(S): at 21:17

## 2025-06-19 RX ADMIN — Medication 40 MILLIGRAM(S): at 04:02

## 2025-06-19 RX ADMIN — BUPROPION HYDROBROMIDE 300 MILLIGRAM(S): 522 TABLET, EXTENDED RELEASE ORAL at 11:11

## 2025-06-19 RX ADMIN — Medication 10 MILLIGRAM(S): at 16:16

## 2025-06-19 RX ADMIN — Medication 112 MICROGRAM(S): at 04:01

## 2025-06-19 RX ADMIN — Medication 650 MILLIGRAM(S): at 17:06

## 2025-06-19 NOTE — PROGRESS NOTE ADULT - SUBJECTIVE AND OBJECTIVE BOX
[facility] - Spine Post Op Note Required Info   -------------------------------------------------------------------     s/p L2-S1 revision Lami/PSF on 5/14/25    PRE-OP SYMPTOMS AND DIAGNOSIS:    75 yo male s/p revision laminectomy extension of spinal fusion at L23 and LLIF L23 from L3-S1 approximately 2 years ago, now Pseudarthrosis L5S1, with screw migration of Left L5, with foraminal stenos bilaterally, failed PT, NSAIDS, EDI, with inabilty to walk 5 feet, with cane, walker, has axial back pain with L>R radiculopathy, numbness and tingling.  Weakness of  TA, EHL, Peroneals 3/5 Left, 4/5 right.  Reduced L5 sensation L>R.  I reviewed surgical diagnosis and surgical treatment plan, which would be revision laminectomy with interbody at L5S1, with revision Posterior Spinal Fusion L2-Pelvis.  I reviewed all major risks, benefits, and complications with surgical intervention.    POST-OP SYMPTOMS:  Patient seen and examined at bedside.  Pain stable s/p gluteal injection.   Patient denies bowel/bladder incontinence, and saddle anesthesia.    Medical issues/events Overnight:   No chest pain, SOB, dizziness. SPB 90s overnight and managed with fluids.       PHYSICAL EXAM  Gen: Lying in bed, NAD  Resp: No increased WOB  Spine:    incision is well c/d/i    Motor:                   C5                C6              C7               C8           T1   R            5/5                5/5            5/5             5/5          5/5  L             5/5               5/5             5/5             5/5          5/5                L2             L3             L4               L5                    S1  R         5/5             5/5             5/5              5/5                   5/5  L          4+/5          5/5            5/5             5/5                    5/5    Sensory:            C5         C6         C7      C8       T1        (0=absent, 1=impaired, 2=normal, NT=not testable)  R         2            2           2        2         2  L          2            2           2        2         2               L2          L3         L4      L5       S1         (0=absent, 1=impaired, 2=normal, NT=not testable)  R         2            2            2        2        2  L          1            1           1        1         1    LEs:  Calves soft, no TTP b/l  WWP b/l     VITAL SIGNS   T(C): 36.6 (06-18-25 @ 05:03), Max: 37.1 (06-18-25 @ 00:44)  HR: 64 (06-18-25 @ 05:03) (64 - 75)  BP: 107/65 (06-18-25 @ 05:03) (90/52 - 167/89)  RR: 18 (06-18-25 @ 05:03) (18 - 18)  SpO2: 96% (06-18-25 @ 05:03) (94% - 96%)    06-17-25 @ 07:01  -  06-18-25 @ 06:36  --------------------------------------------------------  IN: 200 mL / OUT: 1350 mL / NET: -1150 mL        LABS: including new culture/gram stain results     NEW IMAGING AND RESULTS:    PT/OT:  -Last PT/OT session date:  -Progress: (steps, sat in chair, OOB)  -Dispo recommendations:      CONSULTANTS/RECOMMENDATIONS:  Neurology: Follow up read of MRI L spine, Continue symptomatic management with gabapentin, flexeril, tramadol      A/P:74y m s/p revision L2-S1 posterior spinal fusion with extension to the pelvis and bilateral L5/S1 laminectomies on 5/14/25 with Dr. [facility]ña    -PT/OT   -WBAT BLLE  -CT Abdomen/pelvis completed  -FU MR LSp/Pelvis w/wo under sedation. Of note, patient's allergy is to iodine based contrast. Patient does not endorse an allergy to non-iodine based contrast of gadolinium.  - Appreciate neurology  and medicine consult  -DVT ppx SCDs, pending further plan   [facility] - Spine Post Op Note Required Info   -------------------------------------------------------------------     s/p L2-S1 revision Lami/PSF on 5/14/25    PRE-OP SYMPTOMS AND DIAGNOSIS:    75 yo male s/p revision laminectomy extension of spinal fusion at L23 and LLIF L23 from L3-S1 approximately 2 years ago, now Pseudarthrosis L5S1, with screw migration of Left L5, with foraminal stenos bilaterally, failed PT, NSAIDS, EDI, with inabilty to walk 5 feet, with cane, walker, has axial back pain with L>R radiculopathy, numbness and tingling.  Weakness of  TA, EHL, Peroneals 3/5 Left, 4/5 right.  Reduced L5 sensation L>R.  I reviewed surgical diagnosis and surgical treatment plan, which would be revision laminectomy with interbody at L5S1, with revision Posterior Spinal Fusion L2-Pelvis.  I reviewed all major risks, benefits, and complications with surgical intervention.    POST-OP SYMPTOMS:  Patient seen and examined at bedside.  Pain stable s/p gluteal injection.   Patient denies bowel/bladder incontinence, and saddle anesthesia. Pain is reasonably controlled.     Medical issues/events Overnight:   No chest pain, SOB, dizziness. SPB 90s overnight and managed with fluids.       PHYSICAL EXAM  Gen: Lying in bed, NAD  Resp: No increased WOB  Spine:    incision is well c/d/i    Motor:                   C5                C6              C7               C8           T1   R            5/5                5/5            5/5             5/5          5/5  L             5/5               5/5             5/5             5/5          5/5                L2             L3             L4               L5                    S1  R         5/5             5/5             5/5              5/5                   5/5  L          4+/5          5/5            5/5             5/5                    5/5    Sensory:            C5         C6         C7      C8       T1        (0=absent, 1=impaired, 2=normal, NT=not testable)  R         2            2           2        2         2  L          2            2           2        2         2               L2          L3         L4      L5       S1         (0=absent, 1=impaired, 2=normal, NT=not testable)  R         2            2            2        2        2  L          1            1           1        1         1    LEs:  Calves soft, no TTP b/l  WWP b/l     VITAL SIGNS   T(C): 36.6 (06-18-25 @ 05:03), Max: 37.1 (06-18-25 @ 00:44)  HR: 64 (06-18-25 @ 05:03) (64 - 75)  BP: 107/65 (06-18-25 @ 05:03) (90/52 - 167/89)  RR: 18 (06-18-25 @ 05:03) (18 - 18)  SpO2: 96% (06-18-25 @ 05:03) (94% - 96%)    06-17-25 @ 07:01  -  06-18-25 @ 06:36  --------------------------------------------------------  IN: 200 mL / OUT: 1350 mL / NET: -1150 mL        LABS: including new culture/gram stain results     NEW IMAGING AND RESULTS:    PT/OT:  -Last PT/OT session date:  -Progress: (steps, sat in chair, OOB)  -Dispo recommendations:      CONSULTANTS/RECOMMENDATIONS:  Neurology: Follow up read of MRI L spine, Continue symptomatic management with gabapentin, flexeril, tramadol      A/P:74y m s/p revision L2-S1 posterior spinal fusion with extension to the pelvis and bilateral L5/S1 laminectomies on 5/14/25 with Dr. Acuña    -PT/OT   -WBAT BLLE  -CT Abdomen/pelvis completed  -FU MR LSp/Pelvis w/wo under sedation. Of note, patient's allergy is to iodine based contrast. Patient does not endorse an allergy to non-iodine based contrast of gadolinium.  - Appreciate neurology  and medicine consult  -DVT ppx SCDs, pending further plan

## 2025-06-19 NOTE — PROGRESS NOTE ADULT - SUBJECTIVE AND OBJECTIVE BOX
Patient is a 74y old  Male who presents with a chief complaint of back pain s/p L2-S1 revision Lami/ PSF on 5/14/25 (19 Jun 2025 16:06)      SUBJECTIVE / OVERNIGHT EVENTS:    Events noted. Lt leg pain/Sciatica  CONSTITUTIONAL: No fever,  or fatigue  RESPIRATORY: No cough, wheezing,  No shortness of breath  CARDIOVASCULAR: No chest pain, palpitations, dizziness, or leg swelling  GASTROINTESTINAL: No abdominal or epigastric pain. No nausea, vomiting.  NEUROLOGICAL: No headache    MEDICATIONS  (STANDING):  acetaminophen     Tablet .. 650 milliGRAM(s) Oral every 6 hours  buPROPion XL (24-Hour) . 300 milliGRAM(s) Oral daily  dexAMETHasone  IVPB 8 milliGRAM(s) IV Intermittent every 8 hours  dicyclomine 10 milliGRAM(s) Oral four times a day before meals  gabapentin 800 milliGRAM(s) Oral <User Schedule>  levothyroxine 112 MICROGram(s) Oral daily  methocarbamol 500 milliGRAM(s) Oral every 8 hours  pantoprazole    Tablet 40 milliGRAM(s) Oral before breakfast  pregabalin 100 milliGRAM(s) Oral <User Schedule>  QUEtiapine 300 milliGRAM(s) Oral daily  senna 2 Tablet(s) Oral at bedtime  sodium chloride 0.9%. 1000 milliLiter(s) (50 mL/Hr) IV Continuous <Continuous>  traZODone 100 milliGRAM(s) Oral at bedtime  valACYclovir 500 milliGRAM(s) Oral daily    MEDICATIONS  (PRN):  ALPRAZolam 1 milliGRAM(s) Oral every 6 hours PRN anxiety  HYDROmorphone   Tablet 4 milliGRAM(s) Oral every 4 hours PRN Severe Pain (7 - 10)  lactulose Syrup 10 Gram(s) Oral two times a day PRN constipation  naloxone 1 mG/mL Injection for Intranasal Use 2 milliGRAM(s) IntraNasal every 3 minutes PRN as needed for antidote  naloxone Injectable 0.2 milliGRAM(s) IV Push every 3 minutes PRN respiratory depression/ suspsected opioid OD  polyethylene glycol 3350 17 Gram(s) Oral at bedtime PRN for constipation  traMADol 25 milliGRAM(s) Oral every 6 hours PRN Moderate Pain (4 - 6)        CAPILLARY BLOOD GLUCOSE        I&O's Summary    18 Jun 2025 07:01  -  19 Jun 2025 07:00  --------------------------------------------------------  IN: 960 mL / OUT: 0 mL / NET: 960 mL    19 Jun 2025 07:01  -  19 Jun 2025 21:45  --------------------------------------------------------  IN: 640 mL / OUT: 420 mL / NET: 220 mL        T(C): 36.6 (06-19-25 @ 20:53), Max: 37.2 (06-19-25 @ 16:12)  HR: 73 (06-19-25 @ 20:53) (68 - 79)  BP: 158/85 (06-19-25 @ 20:53) (116/68 - 158/85)  RR: 18 (06-19-25 @ 20:53) (17 - 18)  SpO2: 94% (06-19-25 @ 20:53) (91% - 98%)    PHYSICAL EXAM:    NECK: Supple, No JVD  CHEST/LUNG: Clear to auscultation bilaterally; No wheezing.  HEART: Regular rate and rhythm; No murmurs, rubs, or gallops  ABDOMEN: Soft, Nontender, Nondistended; Bowel sounds present  EXTREMITIES:   No edema  NEUROLOGY: AAO X 3      LABS:                  CAPILLARY BLOOD GLUCOSE            RADIOLOGY & ADDITIONAL TESTS:    Imaging Personally Reviewed:    Consultant(s) Notes Reviewed:      Care Discussed with Consultants/Other Providers:    John Valdivia MD, CMD, FACP    [id number] [id number]  Office Tel: [phone]

## 2025-06-19 NOTE — PHYSICAL THERAPY INITIAL EVALUATION ADULT - GENERAL OBSERVATIONS, REHAB EVAL
Pt received sitting EOB in NAD, +spouse bedside, +IVL, VSS, agreeable to participate in therapy at this time

## 2025-06-19 NOTE — PROGRESS NOTE ADULT - ATTENDING COMMENTS
73 yo male s/p revision bilateral laminectomy L5S1 with revision L2-Pelvis Spinal fusion , with improved L5 symptoms, and CT and MRI evidence of Excellent Hardware Construct and Excellent Decompression L5S1.  HE has symptoms consistent with S1 radiculopathy, which I think was exacerbated caused by excess activity, he was ambulating over 5000 feet per day when he was feeling better when he was discharged to home post surgically.  At this point goal is to reduce inflammation, with medications management, Make sure he has a safe discharge to home.  I spoke to his pain management physician who will do a TEFSI L5S1 left side to help alleviate his symptoms.
Patient readmitted s/p Revision Laminectomy L5S1 bilaterally and Revision Spinal Fusion L2-Pelvis. Patient has different symptoms from preop op. Pre op it was mostly L5 based symptoms, now it is primarily S1. Agree with resident note.

## 2025-06-19 NOTE — PHYSICAL THERAPY INITIAL EVALUATION ADULT - PERTINENT HX OF CURRENT PROBLEM, REHAB EVAL
75 yo male s/p revision laminectomy extension of spinal fusion at L23 and LLIF L23 from L3-S1 approximately 2 years ago, now Pseudarthrosis L5S1, with screw migration of Left L5, with foraminal stenos bilaterally, failed PT, NSAIDS, EDI, with inabilty to walk 5 feet, with cane, walker, has axial back pain with L>R radiculopathy, numbness and tingling.  Weakness of  TA, EHL, Peroneals 3/5 Left, 4/5 right.  Reduced L5 sensation L>R.  I reviewed surgical diagnosis and surgical treatment plan, which would be revision laminectomy with interbody at L5S1, with revision Posterior Spinal Fusion L2-Pelvis. 73 yo male s/p revision laminectomy extension of spinal fusion at L2-3 and LLIF L2-3 from L3-S1 approximately 2 years ago, now Pseudarthrosis L5-S1, with screw migration of Left L5, with foraminal stenosis bilaterally, failed PT, NSAIDS, EDI, with inability to walk 5 feet, with cane, walker, has axial back pain with L>R radiculopathy, numbness and tingling. Pt now presents s/p revision laminectomy with interbody at L5-S1, with revision Posterior Spinal Fusion L2-Pelvis 5/14/25 with MD Acuña. Pt admitted to [address] [place] [address] with sciatica and L LE radicular pain rated 10/10.    MR L-Spine 6/17/25: L1-1-2 interposition metallic graft. L2-S1 laminectomies with transpedicular screws and connecting rods. No significant spinal stenosis. Small postoperative collection.  MR Pelvis 6/17/25: Postsurgical changes at the lumbosacral spine with screws across the sacroiliac joints. Bone marrow edema-like signal at right and left hemisacrum adjacent to the screws is probably postsurgical in etiology. No fracture. No advanced productive changes or high grade chondral wear   at the hips.  CT L-Spine, Abdomen, & Pelvis 6/17/25: No acute pathology within the abdomen or pelvis. No evidence of acute fracture or traumatic listhesis. Degenerative and postoperative changes.

## 2025-06-19 NOTE — OCCUPATIONAL THERAPY INITIAL EVALUATION ADULT - ADDITIONAL COMMENTS
pt reports lives with partner in apt with elevator entrance, tub shower. Prior to admission Ind with ADLs/ambulating with cane. Owns RW.

## 2025-06-19 NOTE — PHYSICAL THERAPY INITIAL EVALUATION ADULT - STANDING BALANCE: DYNAMIC, REHAB EVAL
PHYSICAL THERAPY EVALUATION  Type of Visit: Evaluation       Fall Risk Screen:  Fall screen completed by: PT  Have you fallen 2 or more times in the past year?: Yes  Have you fallen and had an injury in the past year?: No  Is patient a fall risk?: No    Subjective  Pt reports that she's having pain while using the left shoulder. Hx of left RCR at least 10+ years ago per patient report.. Pain is worst with lifting, reaching, and various. Denies vague symptoms. Would like to get rid of this pain and return to PLOF pain free.        Condition type:  Recurrent (multiple episodes of < 3 months)  Cause of current episode:  Repetitive     Nature of treatment:  Rehabilitative  Functional ability:  Moderate functional limitations  Documented POC (choose all that apply):  Measurable short and long term/discharge treatment goals related to physical and functional deficits.;Frequency of treatment visits and treatment activities to address deficit areas.;Patient agrees to program participation including home program  Briefly describe symptoms:  Left shoulder pain and limited mobility/strength  How did the symptoms start:  Chronic/reccurent  Average pain/intensity last 24 hours:  5/10  Average pain/intensity past week:  5/10  Frequency of Symptoms:  Frequently (51-75% of the time)  Symptom impact on ADLs:  Moderately  Condition change since eval:  A little better  General health reported by patient:  Excellent      Presenting condition or subjective complaint: (Patient-Rptd) Range of motion and pain with movement  Date of onset: 10/31/24    Relevant medical history: (Patient-Rptd) Osteoarthritis; Overweight; Rheumatoid arthritis; Sleep disorder like apnea   Dates & types of surgery: (Patient-Rptd) Way too many too list    Prior diagnostic imaging/testing results: (Patient-Rptd) X-ray     Prior therapy history for the same diagnosis, illness or injury: (Patient-Rptd) Yes (Patient-Rptd) I'm unsure,  maybe last year      Living  Environment  Social support: (Patient-Rptd) Alone   Type of home: (Patient-Rptd) Apartment/condo; 1 level   Stairs to enter the home: (Patient-Rptd) No       Ramp: (Patient-Rptd) No   Stairs inside the home: (Patient-Rptd) No       Help at home: (Patient-Rptd) Home management tasks (cooking, cleaning); Emergency call system  Equipment owned: (Patient-Rptd) Four-point cane; Walker; Walker with wheels; Raised toilet seat; Dressing equipment; Lift chair     Employment: (Patient-Rptd) No    Hobbies/Interests:      Patient goals for therapy: (Patient-Rptd) Daily living without pain    Pain assessment: Pain present  Location: Left shoulder/Rating: low at rest     Objective   Posture: forward head, rounded shoulders    *=painful    Shoulder AROM (* = pain) Right Left           110*    90*   ER/HBH 70 70   IR/HBB T7 Left glute*     Shoulder PROM (* = pain) Right Left    145    140   ER In 90 abduction 90 In 90 abduction 75*   IR In 90 abduction 70 In 90 abduction 65     Shoulder Strength (* = pain) Right Left   FL 5/5 4/5*   ABD 5/5 NT/5   ER (0 abduction) 5/5 5-/5*   ER (90 abduction) NT/5 NT/5   IR 5/5 5/5   Biceps 5/5 5/5               Palpation tenderness: Superior left shoulder tender    Other Tests: none    Assessment & Plan   CLINICAL IMPRESSIONS  Medical Diagnosis: Tendonitis of left rotator cuff, Osteoarthritis of left shoulder, s/p Left rotator cuff repair    Treatment Diagnosis: Chronic left shoulder pain   Impression/Assessment: Patient is a 81 year old female with left shoulder complaints.  The following significant findings have been identified: Pain, Decreased ROM/flexibility, Decreased joint mobility, Decreased strength, Impaired muscle performance, Decreased activity tolerance, and Impaired posture. These impairments interfere with their ability to perform self care tasks, work tasks, recreational activities, household chores, driving , household mobility, and community mobility as  compared to previous level of function.     Clinical Decision Making (Complexity):  Clinical Presentation: Stable/Uncomplicated  Clinical Presentation Rationale: based on medical and personal factors listed in PT evaluation  Clinical Decision Making (Complexity): Low complexity    PLAN OF CARE  Treatment Interventions:  Interventions: Gait Training, Manual Therapy, Neuromuscular Re-education, Therapeutic Activity, Therapeutic Exercise    Long Term Goals     PT Goal 1  Goal Identifier: Reaching  Goal Description: Pt will be able to reach overhead, out to the side, behind the back and cross body pain free in order to reach cabinets, for dressing, and ADls  Rationale: to maximize safety and independence with performance of ADLs and functional tasks;to maximize safety and independence within the home;to maximize safety and independence within the community;to maximize safety and independence with transportation;to maximize safety and independence with self cares  Goal Progress: new goal  Target Date: 01/09/25  PT Goal 2  Goal Identifier: Lifting  Goal Description: Pt will be able to lift > 5 pounds overhead pain free in order to place things into high spaces with ADLs at home, and perform daily tasks at home  Rationale: to maximize safety and independence with performance of ADLs and functional tasks;to maximize safety and independence within the home;to maximize safety and independence within the community;to maximize safety and independence with transportation;to maximize safety and independence with self cares  Goal Progress: new goal  Target Date: 01/09/25      Frequency of Treatment: 1 x week  Duration of Treatment: 10 weeks    Recommended Referrals to Other Professionals: none  Education Assessment:   Learner/Method: Patient;No Barriers to Learning  Education Comments: no concerns    Risks and benefits of evaluation/treatment have been explained.   Patient/Family/caregiver agrees with Plan of Care.     Evaluation  Time:     PT Eval, Low Complexity Minutes (32844): 10     Signing Clinician: DEVON Reed [facility]                                                                                   OUTPATIENT PHYSICAL THERAPY      PLAN OF TREATMENT FOR OUTPATIENT REHABILITATION   Patient's Last Name, First Name, Ginger Vu YOB: 1943   Provider's Name   [facility]   Medical Record No.  [id number]     Onset Date: 10/31/24  Start of Care Date: 10/31/24     Medical Diagnosis:  Tendonitis of left rotator cuff, Osteoarthritis of left shoulder, s/p Left rotator cuff repair      PT Treatment Diagnosis:  Chronic left shoulder pain Plan of Treatment  Frequency/Duration: 1 x week/ 10 weeks    Certification date from 10/31/24 to 01/09/25         See note for plan of treatment details and functional goals     Alexandr Dempsey, PT                         I CERTIFY THE NEED FOR THESE SERVICES FURNISHED UNDER        THIS PLAN OF TREATMENT AND WHILE UNDER MY CARE     (Physician attestation of this document indicates review and certification of the therapy plan).              Referring Provider:  Mecca Ruvalcaba    Initial Assessment  See Epic Evaluation- Start of Care Date: 10/31/24                 good minus

## 2025-06-19 NOTE — PHYSICAL THERAPY INITIAL EVALUATION ADULT - DIAGNOSIS, PT EVAL
Pt presents with complaints of LBP radiating into L LE not impacting ability to perform ADLs or functional mobility

## 2025-06-19 NOTE — PROGRESS NOTE ADULT - SUBJECTIVE AND OBJECTIVE BOX
Chief Complaint:  Patient is a 74y old  Male who presents with a chief complaint of     HPI:  73 yo male s/p revision laminectomy extension of spinal fusion at L23 and LLIF L23 from L3-S1 approximately 2 years ago. Then pseudarthrosis L5-S1, with screw migration of Left L5, with foraminal stenos bilaterally, failed PT, NSAIDS, EDI, with inability to walk 5 feet, with cane, walker, has axial back pain with L>R radiculopathy, numbness and tingling.  Weakness of  TA, EHL, Peroneals 3/5 Left, 4/5 right.  Reduced L5 sensation L>R.  S/P L2-S1 revision Lami/PSF on 5/14/25.  Now admitted with sciatica.    Current Pain Score: 9/10 to 0/10  Current out- patient pain regimen: tramadol 50 mg TID  Out Patient Pain Management provider: Dr. Will Ocampo    Pt seen lying in bed, reports no effect w/ Dilaudid 2 mg. Still w/ neuropathic pain but has been able to ambulate. Pt is s/p left buttock steroid injection last night and initiated on PO steroids. Educated re: neuropathic pain.     PHYSICAL EXAM  GENERAL: seen at bedside; NAD; well developed, well groomed; no signs of toxicity  HEENT: head atraumatic, normocephalic; anicteric; speech clear, circumstantial and catastrophizing  NEURO: A + O X 3; good concentration; sensation diminished LLE all dermatomes  GI: abdomen soft, non distended; last BM Tuesday  : voiding  EXTREMITIES/ PV: MORENO; 2+ peripheral pulses; no cyanosis, edema or clubbing  MUSCULOSKELETAL: motor strength 5/5 RLE and 4/5 LLE; OOB with cane  SKIN: no rashes, lesions    PSYCH: affect constricted; good eye contact; no signs of depression or anxiety    T(C): 36.5 (06-19-25 @ 14:26)  HR: 76 (06-19-25 @ 14:26)  BP: 135/67 (06-19-25 @ 14:26)  RR: 18 (06-19-25 @ 14:26)  SpO2: 98% (06-19-25 @ 14:26)      MEDICATIONS  (STANDING):  acetaminophen     Tablet .. 650 milliGRAM(s) Oral every 6 hours  buPROPion XL (24-Hour) . 300 milliGRAM(s) Oral daily  dexAMETHasone  IVPB 8 milliGRAM(s) IV Intermittent every 8 hours  dicyclomine 10 milliGRAM(s) Oral four times a day before meals  gabapentin 800 milliGRAM(s) Oral <User Schedule>  levothyroxine 112 MICROGram(s) Oral daily  methocarbamol 500 milliGRAM(s) Oral every 8 hours  pantoprazole    Tablet 40 milliGRAM(s) Oral before breakfast  pregabalin 100 milliGRAM(s) Oral <User Schedule>  QUEtiapine 300 milliGRAM(s) Oral daily  senna 2 Tablet(s) Oral at bedtime  sodium chloride 0.9%. 1000 milliLiter(s) (50 mL/Hr) IV Continuous <Continuous>  traZODone 100 milliGRAM(s) Oral at bedtime  valACYclovir 500 milliGRAM(s) Oral daily    MEDICATIONS  (PRN):  ALPRAZolam 1 milliGRAM(s) Oral every 6 hours PRN anxiety  HYDROmorphone   Tablet 4 milliGRAM(s) Oral every 4 hours PRN Severe Pain (7 - 10)  lactulose Syrup 10 Gram(s) Oral two times a day PRN constipation  naloxone 1 mG/mL Injection for Intranasal Use 2 milliGRAM(s) IntraNasal every 3 minutes PRN as needed for antidote  naloxone Injectable 0.2 milliGRAM(s) IV Push every 3 minutes PRN respiratory depression/ suspsected opioid OD  polyethylene glycol 3350 17 Gram(s) Oral at bedtime PRN for constipation  traMADol 25 milliGRAM(s) Oral every 6 hours PRN Moderate Pain (4 - 6)      Allergies    sulfa drugs (Hives)  contrast media (iodine-based) (Vomiting)  Flagyl (Hives)    Intolerances    propofol (Vomiting; Nausea)      Pertinent labs, radiology, additional studies:  Reviewed                    < from: MR Lumbar Spine w/wo IV Cont (06.17.25 @ 23:37) >  IMPRESSION:  L1-1-2 interposition metallic graft. L2-S1 laminectomies with   transpedicular screws and connecting rods. No significant spinal   stenosis. Small postoperative collection.

## 2025-06-19 NOTE — OCCUPATIONAL THERAPY INITIAL EVALUATION ADULT - ASR WT BEARING STATUS EVAL
Pediatric Well Adolescent Exam: 13-15 Years of age    Chief Complaint   Patient presents with   • Well Adolescent       SUBJECTIVE:  Shawn Oneil is a 13 year old female who presents for a well child exam.  Patient presents  {ACCOMPANYING PEOPLE:050282} who stayed for {AMB Well Child All_Part:1921676}     Preferred method of results communication: {METHOD OF CONTACT:231070}    CONCERNS RAISED TODAY: {AMB Adolescent Concerns:8239512::\"none\"}    RISK ASSESSMENT (HEADSSS):   Home  Lives with: {GUARDIAN:61}  [x]  YES    []  NO    []  Unknown    Changes in home/work environment?  [x]  YES    []  NO    []  Unknown    Eats meals with family?  [x]  YES    []  NO    []  Unknown    Has family member/adult to turn to for help?  [x]  YES    []  NO    []  Unknown    Is permitted and is able to make independent decisions?  Education  Grade in school:  {GRADE:377443}  [x]  Normal    []  Delayed            Academic performance?  [x]  Normal    []  Delayed            Behavior/attention?  [x]  Normal    []  Delayed            Homework?  Eating  Calcium Source: ***  [x]  YES    []  NO    []  Unknown    Eats regular meals including adequate fruits and vegetables?  [x]  YES    []  NO    []  Unknown    Drinks non-sweetened liquids?  []  YES    [x]  NO    []  Unknown    Has concerns about body/appearance?  Activities  [x]  YES    []  NO    []  Unknown    Has friends?  [x]  YES    []  NO    []  Unknown    Has at least 1 hour of physical activity per  day?  [x]  YES    []  NO    []  Unknown    TV/Video time less than 2 hours/day except for homework?  [x]  YES    []  NO    []  Unknown   Volunteers and participates in community activities?  [x]  YES    []  NO    []  Unknown    Involved in other activities (music, drama, etc)?  PROVIDER ONLY QUESTIONS  Drugs  []  YES    []  NO    []  Confidential    Uses tobacco, alcohol or drugs?  Safety  [x]  YES    []  NO    []  Unknown    Home is free of violence?  [x]  YES    []  NO    []   Unknown    Uses safety belts/safety equipment?  [x]  YES    []  NO    []  Unknown    Has peer relationships free of violence?  Sex  []  YES    []  NO    []  Confidential   Has had sex?  Suicide/Mental Health  [x]  YES    []  NO    []  Unknown   Has ways to cope with stress?  [x]  YES    []  NO    []  Unknown   Displays self confidence?  []  YES    [x]  NO    []  Unknown   Has problems with sleep?  []  YES    [x]  NO    []  Unknown   Gets depressed, anxious or irritable/ has mood swings?  []  YES    [x]  NO    []  Unknown    Has thoughts about hurting self or considering suicide?    VISION SCREENING:   No exam data present    OBJECTIVE:  PAST HISTORIES:  Allergies, Medications, Medical history, Surgical history, Family history reviewed and updated.  Toxic Exposure:   Tobacco Use: Not Asked       IMMUNIZATION STATUS: {HX - Immunizations - PEDS:8907617}    IMMUNIZATION REACTIONS: {Reaction Severity:2779818}  VARICELLA STATUS: {IMMUNITY STATUS:73}    RECENT HEALTH EVENTS:  · Illnesses:   []  YES    []  NO    []  N/A  · Hospitalizations:  []  YES    []  NO    []  N/A  · Injuries or Accidents:  []  YES    []  NO    []  N/A    REVIEW OF SYSTEMS:    All systems reviewed and negative except as documented in \"Concerns raised\".    PHYSICAL EXAM:   {AMB WELL ADOLESCENT PE 9-21 YRS M/F:6858656}    Assessment:   Well 13 year old female adolescent.    Plan:  1. All parental concerns and questions discussed.  2. Anticipatory guidance provided, handouts given {PEDS ANTICIPATORY GUIDE 13-17 M/F:0425389}  3. Immunizations per orders.  Risks, benefits, and side effects discussed.  4. {[facility] Immunization Plan:5506941}  5. Vaccine Information Statement for Immunizations given    Follow up:Return in about 1 year (around 1/10/2023) for Well Visit .      Pediatric Well Adolescent Exam: 15-21 Years of age    Chief Complaint   Patient presents with   • Well Adolescent       SUBJECTIVE:  Shawn Oneil is a 13 year old female who presents  for a well child exam.  Patient presents  {ACCOMPANYING PEOPLE:451109} who stayed for {AMB Well Child All_Part:9359883}     Preferred method of results communication: {METHOD OF CONTACT:173066}    CONCERNS RAISED TODAY: {AMB Adolescent Concerns:2591710::\"none\"}    RISK ASSESSMENT (HEADSSS):   Home  Lives with: {GUARDIAN:61}  [x]  YES    []  NO    []  Unknown    Changes in home/work environment?  [x]  YES    []  NO    []  Unknown    Eats meals with family?  [x]  YES    []  NO    []  Unknown    Has family member/adult to turn to for help?  [x]  YES    []  NO    []  Unknown    Is permitted and is able to make independent decisions?  Education  Grade in school:  {GRADE:055951}   [x]  Normal    []  Delayed            Academic performance?  [x]  Normal    []  Delayed            Behavior/attention?  [x]  Normal    []  Delayed            Homework?  Eating  Calcium Source: ***  [x]  YES    []  NO    []  Unknown    Eats regular meals including adequate fruits and vegetables?  [x]  YES    []  NO    []  Unknown    Drinks non-sweetened liquids?  []  YES    [x]  NO    []  Unknown    Has concerns about body/appearance?  Activities  [x]  YES    []  NO    []  Unknown    Has friends?  [x]  YES    []  NO    []  Unknown    Has at least 1 hour of physical activity per day?  [x]  YES    []  NO    []  Unknown    Electronic screen time less than 2 hours/day except for homework?  [x]  YES    []  NO    []  Unknown   Volunteers and participates in community activities?  [x]  YES    []  NO    []  Unknown    Involved in other  activities (music, drama, etc)?  PROVIDER ONLY QUESTIONS  Drugs  []  YES    []  NO    []  Confidential    Uses tobacco, alcohol or drugs?  Safety  [x]  YES    []  NO    []  Unknown    Home is free of violence?  [x]  YES    []  NO    []  Unknown    Uses safety belts/safety equipment?  []  YES    []  NO    []  Unknown    Impaired/distracted driving?  [x]  YES    []  NO    []  Unknown    Has peer relationships free of  violence?  Sex  []  YES    []  NO    []  Confidential   Has had sex?  Suicide/Mental Health  [x]  YES    []  NO    []  Unknown   Has ways to cope with stress?  [x]  YES    []  NO    []  Unknown   Displays self confidence?  []  YES    [x]  NO    []  Unknown   Has problems with sleep?  []  YES    [x]  NO    []  Unknown   Gets depressed, anxious or irritable/ has mood swings?  []  YES    [x]  NO    []  Unknown    Has thoughts about hurting self or considering suicide?    VISION SCREENING:   No exam data present    OBJECTIVE:  PAST HISTORIES:  Allergies, Medications, Medical history, Surgical history, Family history reviewed and updated.  Toxic Exposure:   Tobacco Use: Not Asked       IMMUNIZATION STATUS: {HX - Immunizations - PEDS:2579969}   IMMUNIZATION REACTIONS: {Reaction Severity:1333319}  VARICELLA STATUS: {IMMUNITY STATUS:73}    RECENT HEALTH EVENTS:  · Illnesses: []  YES    []  NO    []  N/A  · Hospitalizations: []  YES    []  NO    []  N/A  · Injuries or Accidents: []  YES    []  NO    []  N/A    REVIEW OF SYSTEMS:    All systems reviewed and negative except as documented in \"Concerns raised\".    PHYSICAL EXAM:   {AMB WELL ADOLESCENT PE 9-21 YRS M/F:8404992}    Assessment:   Well 13 year old female adolescent.    Plan:  1. All parental concerns and questions discussed.  2. Anticipatory guidance provided, handouts given {PEDS ANTICIPATORY GUIDE 13-17 M/F:1662239}  3. Immunizations per orders.  Risks, benefits, and side effects discussed.  4. {[facility] Immunization Plan:0162280}  5. VIS for Immunizations given.    Follow up:Return in about 1 year (around 1/10/2023) for Well Visit .   no weight-bearing restrictions

## 2025-06-19 NOTE — OCCUPATIONAL THERAPY INITIAL EVALUATION ADULT - PERTINENT HX OF CURRENT PROBLEM, REHAB EVAL
73 yo male s/p revision laminectomy extension of spinal fusion at L23 and LLIF L23 from L3-S1 approximately 2 years ago, now Pseudarthrosis L5S1, with screw migration of Left L5, with foraminal stenos bilaterally, failed PT, NSAIDS, EDI, with inabilty to walk 5 feet, with cane, walker, has axial back pain with L>R radiculopathy, numbness and tingling.  Weakness of  TA, EHL, Peroneals 3/5 Left, 4/5 right.  Reduced L5 sensation L>R.  I reviewed surgical diagnosis and surgical treatment plan, which would be revision laminectomy with interbody at L5S1, with revision Posterior Spinal Fusion L2-Pelvis.  I reviewed all major risks, benefits, and complications with surgical intervention.s/p L2-S1 revision Lami/PSF on 5/14/25. POST-OP SYMPTOMS:Patient seen and examined at bedside.  Experiencing  right lower extremity numbness and shooting pain/numbness/tingling down left leg. Patient has been ambulating with a cane. Patient denies bowel/bladder incontinence, saddle anesthesia, weight loss, weakness. Denies trauma    6/17 MRI spine/pelvis: L1-1-2 interposition metallic graft. L2-S1 laminectomies with transpedicular screws and connecting rods. No significant spinal stenosis. Small postoperative collection.

## 2025-06-20 ENCOUNTER — TRANSCRIPTION ENCOUNTER (OUTPATIENT)
Age: 75
End: 2025-06-20

## 2025-06-20 RX ORDER — DEXAMETHASONE 0.5 MG/1
1 TABLET ORAL EVERY 6 HOURS
Refills: 0 | Status: CANCELLED | OUTPATIENT
Start: 2025-06-27 | End: 2025-06-21

## 2025-06-20 RX ORDER — DEXAMETHASONE 0.5 MG/1
TABLET ORAL
Refills: 0 | Status: DISCONTINUED | OUTPATIENT
Start: 2025-06-20 | End: 2025-06-21

## 2025-06-20 RX ORDER — DEXAMETHASONE 0.5 MG/1
5 TABLET ORAL EVERY 6 HOURS
Refills: 0 | Status: DISCONTINUED | OUTPATIENT
Start: 2025-06-20 | End: 2025-06-21

## 2025-06-20 RX ORDER — PREDNISONE 20 MG/1
4 TABLET ORAL
Qty: 14 | Refills: 0
Start: 2025-06-20 | End: 2025-07-01

## 2025-06-20 RX ORDER — TIZANIDINE 4 MG/1
1 TABLET ORAL
Qty: 0 | Refills: 0 | DISCHARGE

## 2025-06-20 RX ORDER — DEXAMETHASONE 0.5 MG/1
3 TABLET ORAL EVERY 6 HOURS
Refills: 0 | Status: CANCELLED | OUTPATIENT
Start: 2025-06-24 | End: 2025-06-21

## 2025-06-20 RX ADMIN — Medication 4 MILLIGRAM(S): at 06:43

## 2025-06-20 RX ADMIN — GABAPENTIN 800 MILLIGRAM(S): 400 CAPSULE ORAL at 21:10

## 2025-06-20 RX ADMIN — Medication 40 MILLIGRAM(S): at 05:43

## 2025-06-20 RX ADMIN — METHOCARBAMOL 500 MILLIGRAM(S): 500 TABLET, FILM COATED ORAL at 21:10

## 2025-06-20 RX ADMIN — DEXAMETHASONE 101.6 MILLIGRAM(S): 0.5 TABLET ORAL at 05:43

## 2025-06-20 RX ADMIN — Medication 650 MILLIGRAM(S): at 05:42

## 2025-06-20 RX ADMIN — METHOCARBAMOL 500 MILLIGRAM(S): 500 TABLET, FILM COATED ORAL at 13:55

## 2025-06-20 RX ADMIN — Medication 1 MILLIGRAM(S): at 18:10

## 2025-06-20 RX ADMIN — Medication 650 MILLIGRAM(S): at 00:10

## 2025-06-20 RX ADMIN — Medication 10 MILLIGRAM(S): at 08:03

## 2025-06-20 RX ADMIN — DEXAMETHASONE 5 MILLIGRAM(S): 0.5 TABLET ORAL at 18:12

## 2025-06-20 RX ADMIN — Medication 650 MILLIGRAM(S): at 11:07

## 2025-06-20 RX ADMIN — Medication 1 MILLIGRAM(S): at 09:19

## 2025-06-20 RX ADMIN — Medication 4 MILLIGRAM(S): at 05:43

## 2025-06-20 RX ADMIN — Medication 10 MILLIGRAM(S): at 12:02

## 2025-06-20 RX ADMIN — QUETIAPINE FUMARATE 300 MILLIGRAM(S): 25 TABLET ORAL at 23:08

## 2025-06-20 RX ADMIN — Medication 112 MICROGRAM(S): at 05:43

## 2025-06-20 RX ADMIN — Medication 4 MILLIGRAM(S): at 22:05

## 2025-06-20 RX ADMIN — Medication 650 MILLIGRAM(S): at 06:43

## 2025-06-20 RX ADMIN — PREGABALIN 100 MILLIGRAM(S): 50 CAPSULE ORAL at 14:12

## 2025-06-20 RX ADMIN — PREGABALIN 100 MILLIGRAM(S): 50 CAPSULE ORAL at 05:42

## 2025-06-20 RX ADMIN — Medication 4 MILLIGRAM(S): at 21:10

## 2025-06-20 RX ADMIN — Medication 10 MILLIGRAM(S): at 21:14

## 2025-06-20 RX ADMIN — Medication 650 MILLIGRAM(S): at 23:08

## 2025-06-20 RX ADMIN — Medication 10 MILLIGRAM(S): at 15:57

## 2025-06-20 RX ADMIN — Medication 100 MILLIGRAM(S): at 23:08

## 2025-06-20 RX ADMIN — BUPROPION HYDROBROMIDE 300 MILLIGRAM(S): 522 TABLET, EXTENDED RELEASE ORAL at 11:08

## 2025-06-20 RX ADMIN — Medication 500 MILLIGRAM(S): at 11:07

## 2025-06-20 RX ADMIN — Medication 650 MILLIGRAM(S): at 18:10

## 2025-06-20 RX ADMIN — DEXAMETHASONE 5 MILLIGRAM(S): 0.5 TABLET ORAL at 23:08

## 2025-06-20 RX ADMIN — METHOCARBAMOL 500 MILLIGRAM(S): 500 TABLET, FILM COATED ORAL at 05:43

## 2025-06-20 NOTE — DISCHARGE NOTE PROVIDER - NSDCFUADDINST_GEN_ALL_CORE_FT
Please continue to take your steroid taper as prescribed.   Please continue to follow pre-printed instructions provided by Dr. Acuña

## 2025-06-20 NOTE — DISCHARGE NOTE PROVIDER - CARE PROVIDER_API CALL
Felipe Acuña  Orthopaedic Surgery  [address], [address]  [place] [address]  Phone: [phone]  Fax: [phone]  Follow Up Time:

## 2025-06-20 NOTE — DISCHARGE NOTE PROVIDER - NSDCMRMEDTOKEN_GEN_ALL_CORE_FT
acetaminophen 325 mg oral tablet: 3 tab(s) orally every 8 hours as needed for HA, Fever or Pain  ALPRAZolam 1 mg oral tablet: 1 tab(s) orally every 6 hours As needed anxiety  buPROPion 300 mg/24 hours (XL) oral tablet, extended release: 1 tab(s) orally once a day  dicyclomine 10 mg oral capsule: 1 cap(s) orally 4 times a day (before meals and at bedtime)  Eliquis 2.5 mg oral tablet: 1 tab(s) orally 2 times a day Continue for 30 days, discuss with surgeon about length of required treatment MDD: 2  gabapentin 300 mg oral capsule: 3 cap(s) orally 3 times a day  levothyroxine 112 mcg (0.112 mg) oral tablet: 1 tab(s) orally once a day  naloxone 4 mg/0.1 mL nasal spray: 1 spray(s) intranasally every 2 minutes as needed for antidote alternate nostrils  oxyCODONE 5 mg oral tablet: 1 tab(s) orally every 4 hours as needed for Moderate Pain (4 - 6) Or 2 tabs orally every 4 hours as needed for severe pain (7-10) MDD: 6  pantoprazole 40 mg oral delayed release tablet: 1 tab(s) orally once a day (before a meal) MDD: 1  polyethylene glycol 3350 oral powder for reconstitution: 17 gram(s) orally once a day (at bedtime) as needed for  constipation  predniSONE 10 mg oral tablet: 4 tab(s) orally once a day Continue steroid taper 4 tabs orally every day x 3 days, 3 tabs orally 2 tabs orally every day x 3 days, 2 tabs orally every day x 3 days and  then 1 tab orally every day x 3 days, then stop (Patient has 16 Tabs at home already)  QUEtiapine 300 mg oral tablet: 1 tab(s) orally once a day  senna leaf extract oral tablet: 2 tab(s) orally once a day (at bedtime)  tiZANidine 4 mg oral tablet: 1 tab(s) orally 3 times a day as needed for  muscle spasm  traMADol 50 mg oral tablet: 0.5 tab(s) orally every 6 hours as needed for Mild Pain (1 - 3) MDD: 2  traZODone 100 mg oral tablet: 1 tab(s) orally once a day (at bedtime)  valACYclovir 500 mg oral tablet: 1 tab(s) orally once a day

## 2025-06-20 NOTE — DISCHARGE NOTE PROVIDER - NSDCFUADDAPPT_GEN_ALL_CORE_FT
Please follow up with Dr. Acuña on your scheduled visit on 7/08/25.  Please call the office to confirm your appointment date and time.    Please Follow up with your pain management doctor for possible epidural injection and follow up pain care.

## 2025-06-20 NOTE — PROGRESS NOTE ADULT - SUBJECTIVE AND OBJECTIVE BOX
Patient is a 74y old  Male who presents with a chief complaint of back pain s/p L2-S1 revision Lami/ PSF on 5/14/25 (19 Jun 2025 16:06)      SUBJECTIVE / OVERNIGHT EVENTS:    Events noted. Lt leg pain/Sciatica  CONSTITUTIONAL: No fever,  or fatigue  RESPIRATORY: No cough, wheezing,  No shortness of breath  CARDIOVASCULAR: No chest pain, palpitations, dizziness, or leg swelling  GASTROINTESTINAL: No abdominal or epigastric pain. No nausea, vomiting.  NEUROLOGICAL: No headache    MEDICATIONS  (STANDING):  acetaminophen     Tablet .. 650 milliGRAM(s) Oral every 6 hours  buPROPion XL (24-Hour) . 300 milliGRAM(s) Oral daily  dexAMETHasone  IVPB 8 milliGRAM(s) IV Intermittent every 8 hours  dicyclomine 10 milliGRAM(s) Oral four times a day before meals  gabapentin 800 milliGRAM(s) Oral <User Schedule>  levothyroxine 112 MICROGram(s) Oral daily  methocarbamol 500 milliGRAM(s) Oral every 8 hours  pantoprazole    Tablet 40 milliGRAM(s) Oral before breakfast  pregabalin 100 milliGRAM(s) Oral <User Schedule>  QUEtiapine 300 milliGRAM(s) Oral daily  senna 2 Tablet(s) Oral at bedtime  sodium chloride 0.9%. 1000 milliLiter(s) (50 mL/Hr) IV Continuous <Continuous>  traZODone 100 milliGRAM(s) Oral at bedtime  valACYclovir 500 milliGRAM(s) Oral daily    MEDICATIONS  (PRN):  ALPRAZolam 1 milliGRAM(s) Oral every 6 hours PRN anxiety  HYDROmorphone   Tablet 4 milliGRAM(s) Oral every 4 hours PRN Severe Pain (7 - 10)  lactulose Syrup 10 Gram(s) Oral two times a day PRN constipation  naloxone 1 mG/mL Injection for Intranasal Use 2 milliGRAM(s) IntraNasal every 3 minutes PRN as needed for antidote  naloxone Injectable 0.2 milliGRAM(s) IV Push every 3 minutes PRN respiratory depression/ suspsected opioid OD  polyethylene glycol 3350 17 Gram(s) Oral at bedtime PRN for constipation  traMADol 25 milliGRAM(s) Oral every 6 hours PRN Moderate Pain (4 - 6)        CAPILLARY BLOOD GLUCOSE        I&O's Summary    18 Jun 2025 07:01  -  19 Jun 2025 07:00  --------------------------------------------------------  IN: 960 mL / OUT: 0 mL / NET: 960 mL    19 Jun 2025 07:01  -  19 Jun 2025 21:45  --------------------------------------------------------  IN: 640 mL / OUT: 420 mL / NET: 220 mL        T(C): 36.6 (06-19-25 @ 20:53), Max: 37.2 (06-19-25 @ 16:12)  HR: 73 (06-19-25 @ 20:53) (68 - 79)  BP: 158/85 (06-19-25 @ 20:53) (116/68 - 158/85)  RR: 18 (06-19-25 @ 20:53) (17 - 18)  SpO2: 94% (06-19-25 @ 20:53) (91% - 98%)    PHYSICAL EXAM:    NECK: Supple, No JVD  CHEST/LUNG: Clear to auscultation bilaterally; No wheezing.  HEART: Regular rate and rhythm; No murmurs, rubs, or gallops  ABDOMEN: Soft, Nontender, Nondistended; Bowel sounds present  EXTREMITIES:   No edema  NEUROLOGY: AAO X 3      LABS:                  CAPILLARY BLOOD GLUCOSE            RADIOLOGY & ADDITIONAL TESTS:    Imaging Personally Reviewed:    Consultant(s) Notes Reviewed:      Care Discussed with Consultants/Other Providers:    John Valdivia MD, CMD, FACP    [address] [id number]  Office Tel: [phone]

## 2025-06-20 NOTE — CHART NOTE - NSCHARTNOTEFT_GEN_A_CORE
75 yo male s/p revision laminectomy extension of spinal fusion at L23 and LLIF L23 from L3-S1 approximately 2 years ago. Then pseudarthrosis L5-S1, with screw migration of Left L5, with foraminal stenos bilaterally, failed PT, NSAIDS, EDI, with inability to walk 5 feet, with cane, walker, has axial back pain with L>R radiculopathy, numbness and tingling.  Weakness of  TA, EHL, Peroneals 3/5 Left, 4/5 right.  Reduced L5 sensation L>R.  S/P L2-S1 revision Lami/PSF on 5/14/25.  Now admitted with sciatica.    Current out- patient pain regimen: tramadol 50 mg TID  Out Patient Pain Management provider: Dr. Will Ocampo    Current Pain Score: 7/10 to 0/10    Continue PO dilaudid 4 mg Q 4 hours PRN severe pain.  Continue lyrica 100 mg QAM and Q noon, with neurontin 800 mg at HS- current CrCl is 91.5.  Continue robaxin 500 mg Q 8 hours.    Monitor for sedation and respiratory depression.  Bowel regimen PRN.   Incentive spirometer.  OOB/ PT per primary team.    Please prescribe a narcan rescue kit on discharge (naloxone 4 mg/ 0.1 ml nasal spray- 1 spray Q 2-3 minutes alternating between nostrils).   Follow up with  Dr. Will Ocampo for continued pain management after discharge.    Signing off.      Chronic Pain Service  [phone]
Patient is a 74M s/p revision L2-pelvis PSF on 5/14/25 with Dr. Acuña  Per Dr. Acuña- recommend the following imaging:   -MRI LSp and pelvis w/wo  -CT Abdomen/pelvis
Patient visited at bedside alongside Dr. Acuña for left buttock/gluteal injection given LLE weakness spanning from L buttocks to L foot. Ortho team explained R/B/A of receiving injection and possibility of requiring epidural injection/alternative treatment in an outpatient basis for progression of symptoms. After obtaining verbal patient consent, IM 40 mg Depo Medrol was mixed with 3 cc of 1% Lidocaine injectable in 5 cc syringe, left buttocks region sterilized with povidone iodine solution and injection was given. Patient tolerated injection well without any issues. Will have patient work with PT/OT 6/19 for evaluation of mobilization. Will continue to monitor for any further symptoms.    Nick Julian PA-C  Orthopedic Surgery  Pager #[phone]

## 2025-06-20 NOTE — DISCHARGE NOTE PROVIDER - HOSPITAL COURSE
History of Present Illness:   s/p L2-S1 revision Lami/PSF on 5/14/25    PRE-OP SYMPTOMS AND DIAGNOSIS:    73 yo male s/p revision laminectomy extension of spinal fusion at L23 and LLIF L23 from L3-S1 approximately 2 years ago, now Pseudarthrosis L5S1, with screw migration of Left L5, with foraminal stenos bilaterally, failed PT, NSAIDS, EDI, with inabilty to walk 5 feet, with cane, walker, has axial back pain with L>R radiculopathy, numbness and tingling.  Weakness of  TA, EHL, Peroneals 3/5 Left, 4/5 right.  Reduced L5 sensation L>R.  I reviewed surgical diagnosis and surgical treatment plan, which would be revision laminectomy with interbody at L5S1, with revision Posterior Spinal Fusion L2-Pelvis.  I reviewed all major risks, benefits, and complications with surgical intervention.    POST-OP SYMPTOMS:  Patient seen and examined at bedside.  Experiencing  right lower extremity numbness and shooting pain/numbness/tingling down left leg. Patient has been ambulating with a cane. Patient denies bowel/bladder incontinence, saddle anesthesia, weight loss, weakness. Denies trauma.     Hospital Course:  6/16/25: Patient admitted to [facility] for decreased  sensation in L>R lower extremity.   6/17/25: MRI L/S performed with finding as below  6/18/25: Dr. Acuña performed Intramuscular Depo/Medrol Lidocaine trigger point injection to patient L Gluteal region.   6/19/25: Physical Therapy cleared patient for home disposition with no rehab needs.  6/20/25: Patient medically cleared and stable for discharge home and follow up with pain management provider upon discharge for possible outpatient epidural injection.    Chronic pain, Neurology and Internal medicine team following during this admission and all recommendations followed.  Remainder of hospital stay unremarkable and patient medically cleared and stable for discharge.

## 2025-06-20 NOTE — PROGRESS NOTE ADULT - SUBJECTIVE AND OBJECTIVE BOX
Orthopedic Progress Note    Patient seen and examined at bedside.     Patient is s/p L2-S1 revision lami/PSF on 5/14/25    PRE-OP SYMPTOMS AND DIAGNOSIS: 75 yo male s/p revision laminectomy extension of spinal fusion at L23 and LLIF L23 from L3-S1 approximately 2 years ago, now Pseudarthrosis L5S1, with screw migration of Left L5, with foraminal stenos bilaterally, failed PT, NSAIDS, EDI, with inabilty to walk 5 feet, with cane, walker, has axial back pain with L>R radiculopathy, numbness and tingling.  Weakness of  TA, EHL, Peroneals 3/5 Left, 4/5 right.  Reduced L5 sensation L>R.  I reviewed surgical diagnosis and surgical treatment plan, which would be revision laminectomy with interbody at L5S1, with revision Posterior Spinal Fusion L2-Pelvis.    POST-OP SYMPTOMS: Patient reports RLE "nerve pain" and "pins and needles."    PHYSICAL EXAM:  Gen: Lying in bed, NAD  Motor:             C5           C6           C7           C8           T1   R           5/5         5/5          5/5          5/5          5/5  L            5/5         5/5          5/5          5/5          5/5              L2           L3           L4           L5           S1   R           5/5        5/5          5/5         5/5          5/5  L            5/5        5/5          5/5         5/5          5/5    Sensory:               C5         C6         C7         C8         T1  (0=absent, 1=impaired, 2=normal, NT=not testable)  R             2           2           2           2           2  L              2           2           2           2           2                 L2         L3         L4         L5         S1  (0=absent, 1=impaired, 2=normal, NT=not testable)  R             2           2           2           2           2  L              2           2           2           2           2  BLE: Calves soft and nontender  + DP pulse     VITAL SIGNS:   T(C): 36.5 (06-20-25 @ 13:04), Max: 37.2 (06-19-25 @ 16:12)  HR: 74 (06-20-25 @ 13:04) (70 - 94)  BP: 117/68 (06-20-25 @ 13:04) (114/79 - 158/85)  RR: 18 (06-20-25 @ 13:04) (18 - 18)  SpO2: 93% (06-20-25 @ 13:04) (92% - 98%)    PT/OT: PT recommended home with outpatient PT, pending PT clearance.    A/P: 73 y/o male s/p L2-S1 revision lami/PSF on 5/14/25    - Pain control/analgesia  - Steroid taper  - DVT ppx: SCDs  - PT/OT   - WBAT  - OOB  - Incentive spirometer  - GI ppx  - Bowel regimen  - Appreciate internal medicine comanagement  - Notify ortho for questions  - Dispo: PT recommended home with outpatient PT, pending PT clearance    Maria Eugenia Philippe PA-C  Orthopedic Surgery Team  Team Pager #[phone]

## 2025-06-20 NOTE — DISCHARGE NOTE PROVIDER - NSDCQMERRANDS_GEN_ALL_CORE
RT paged to room 98618 due to increasing O2 needs and dyspnea.  Pt on BiPAP 12/8 with 11 l/m bled in. SpO2 88-89%.  RT moved O2 bleed in in port proximal to mask.  Attempted HHN with DuoNeb, but patient unwilling to come off BiPAP.  STAT CXR and labs being done. Dyspnea appears to be resolving and SpO2 is up to 94%, however pt denies improvement and states \"I can't breathe\".  STAT team in room.    Yes

## 2025-06-20 NOTE — DISCHARGE NOTE PROVIDER - NSDCFUSCHEDAPPT_GEN_ALL_CORE_FT
[facility]  CATSCAN [address]  Scheduled Appointment: 06/28/2025    [facility]  [facility]  Scheduled Appointment: 06/28/2025    Felipe Acuña  [facility]  ORTHOSURG [address] [facility]  Scheduled Appointment: 07/08/2025

## 2025-06-21 ENCOUNTER — TRANSCRIPTION ENCOUNTER (OUTPATIENT)
Age: 75
End: 2025-06-21

## 2025-06-21 VITALS
DIASTOLIC BLOOD PRESSURE: 78 MMHG | HEART RATE: 71 BPM | OXYGEN SATURATION: 92 % | RESPIRATION RATE: 18 BRPM | SYSTOLIC BLOOD PRESSURE: 153 MMHG | TEMPERATURE: 98 F

## 2025-06-21 PROCEDURE — 80053 COMPREHEN METABOLIC PANEL: CPT

## 2025-06-21 PROCEDURE — 86850 RBC ANTIBODY SCREEN: CPT

## 2025-06-21 PROCEDURE — 86900 BLOOD TYPING SEROLOGIC ABO: CPT

## 2025-06-21 PROCEDURE — 97161 PT EVAL LOW COMPLEX 20 MIN: CPT

## 2025-06-21 PROCEDURE — 86901 BLOOD TYPING SEROLOGIC RH(D): CPT

## 2025-06-21 PROCEDURE — 72197 MRI PELVIS W/O & W/DYE: CPT

## 2025-06-21 PROCEDURE — 97166 OT EVAL MOD COMPLEX 45 MIN: CPT

## 2025-06-21 PROCEDURE — 96374 THER/PROPH/DIAG INJ IV PUSH: CPT

## 2025-06-21 PROCEDURE — 85027 COMPLETE CBC AUTOMATED: CPT

## 2025-06-21 PROCEDURE — 85610 PROTHROMBIN TIME: CPT

## 2025-06-21 PROCEDURE — 85730 THROMBOPLASTIN TIME PARTIAL: CPT

## 2025-06-21 PROCEDURE — 81003 URINALYSIS AUTO W/O SCOPE: CPT

## 2025-06-21 PROCEDURE — 74176 CT ABD & PELVIS W/O CONTRAST: CPT

## 2025-06-21 PROCEDURE — 72158 MRI LUMBAR SPINE W/O & W/DYE: CPT

## 2025-06-21 PROCEDURE — 99285 EMERGENCY DEPT VISIT HI MDM: CPT

## 2025-06-21 PROCEDURE — A9585: CPT

## 2025-06-21 RX ORDER — PREGABALIN 50 MG/1
1 CAPSULE ORAL
Qty: 28 | Refills: 0
Start: 2025-06-21 | End: 2025-07-04

## 2025-06-21 RX ORDER — DEXAMETHASONE 0.5 MG/1
1 TABLET ORAL
Qty: 12 | Refills: 0
Start: 2025-06-21 | End: 2025-06-23

## 2025-06-21 RX ORDER — DEXAMETHASONE 0.5 MG/1
1 TABLET ORAL
Qty: 8 | Refills: 0
Start: 2025-06-21 | End: 2025-06-22

## 2025-06-21 RX ADMIN — Medication 10 MILLIGRAM(S): at 11:29

## 2025-06-21 RX ADMIN — Medication 1 MILLIGRAM(S): at 05:28

## 2025-06-21 RX ADMIN — Medication 4 MILLIGRAM(S): at 03:09

## 2025-06-21 RX ADMIN — Medication 500 MILLIGRAM(S): at 11:29

## 2025-06-21 RX ADMIN — Medication 650 MILLIGRAM(S): at 06:27

## 2025-06-21 RX ADMIN — Medication 40 MILLIGRAM(S): at 05:27

## 2025-06-21 RX ADMIN — Medication 10 MILLIGRAM(S): at 05:28

## 2025-06-21 RX ADMIN — BUPROPION HYDROBROMIDE 300 MILLIGRAM(S): 522 TABLET, EXTENDED RELEASE ORAL at 11:29

## 2025-06-21 RX ADMIN — PREGABALIN 100 MILLIGRAM(S): 50 CAPSULE ORAL at 05:28

## 2025-06-21 RX ADMIN — METHOCARBAMOL 500 MILLIGRAM(S): 500 TABLET, FILM COATED ORAL at 05:27

## 2025-06-21 RX ADMIN — DEXAMETHASONE 5 MILLIGRAM(S): 0.5 TABLET ORAL at 05:28

## 2025-06-21 RX ADMIN — Medication 112 MICROGRAM(S): at 05:27

## 2025-06-21 RX ADMIN — Medication 4 MILLIGRAM(S): at 04:09

## 2025-06-21 RX ADMIN — DEXAMETHASONE 5 MILLIGRAM(S): 0.5 TABLET ORAL at 11:40

## 2025-06-21 RX ADMIN — Medication 650 MILLIGRAM(S): at 00:09

## 2025-06-21 RX ADMIN — Medication 650 MILLIGRAM(S): at 05:27

## 2025-06-21 RX ADMIN — Medication 650 MILLIGRAM(S): at 11:29

## 2025-06-21 NOTE — PROGRESS NOTE ADULT - PROVIDER SPECIALTY LIST ADULT
Orthopedics
Orthopedics
Pain Medicine
Internal Medicine
Internal Medicine
Orthopedics
Internal Medicine
Orthopedics

## 2025-06-21 NOTE — PROGRESS NOTE ADULT - ASSESSMENT
A/P: 75 y/o male s/p L2-S1 revision lami/PSF on 5/14/25    - Pain control/analgesia  - Steroid taper  - DVT ppx: SCDs  - PT/OT   - WBAT  - OOB  - Incentive spirometer  - GI ppx  - Bowel regimen  - Appreciate internal medicine comanagement  - Notify ortho for questions  - Dispo: PT cleared for home with outpatient PT    Lb Gonzalez PA-C  Orthopedic Surgery Team  Team Pager #[phone]
75 yo male s/p revision laminectomy extension of spinal fusion at L23 and LLIF L23 from L3-S1 approximately 2 years ago. Then pseudarthrosis L5-S1, with screw migration of Left L5, with foraminal stenos bilaterally, failed PT, NSAIDS, EDI, with inability to walk 5 feet, with cane, walker, has axial back pain with L>R radiculopathy, numbness and tingling.  Weakness of  TA, EHL, Peroneals 3/5 Left, 4/5 right.  Reduced L5 sensation L>R.  S/P L2-S1 revision Lami/PSF on 5/14/25.  Now admitted with sciatica.    Current Pain Score: 9/10 to 0/10  Current out- patient pain regimen: tramadol 50 mg TID  Out Patient Pain Management provider: Dr. Will Ocampo    Pt seen lying in bed, reports no effect w/ Dilaudid 2 mg. Still w/ neuropathic pain but has been able to ambulate. Pt is s/p left buttock steroid injection last night and initiated on PO steroids.     Increase PO dilaudid to 4 mg Q 4 hours PRN severe pain  Continue lyrica 100 mg QAM and Q noon, with neurontin 800 mg at HS- current CrCl is 91.5.  Continue robaxin 500 mg Q 8 hours.    Monitor for sedation and respiratory depression.  Bowel regimen PRN.   Incentive spirometer.  OOB/ PT per primary team.    Please prescribe a narcan rescue kit on discharge (naloxone 4 mg/ 0.1 ml nasal spray- 1 spray Q 2-3 minutes alternating between nostrils).   Follow up with  Dr. Will Ocampo for continued pain management after discharge.    Total time of encounter:   40  minutes      Chronic Pain Service  [phone]  
A/P:  74y m s/p revision L2-S1 posterior spinal fusion with extension to the pelvis and bilateral L5/S1 laminectomies on 5/14/25, now presents with LLE shooting pain.    Ortho spine f/up appreciated.  Pain control with Oxy IR  MRI L spine review  Anxiety/Depression- Cw Welbutrin/Seroquel/Trazodone/Xanax  Bowel regimen        
A/P:  74y m s/p revision L2-S1 posterior spinal fusion with extension to the pelvis and bilateral L5/S1 laminectomies on 5/14/25, now presents with LLE shooting pain.    Ortho spine f/up appreciated.  Pain control with Oxy IR  MRI L spine review  Anxiety/Depression- Cw Welbutrin/Seroquel/Trazodone/Xanax  Bowel regimen        
A/P:  74y m s/p revision L2-S1 posterior spinal fusion with extension to the pelvis and bilateral L5/S1 laminectomies on 5/14/25, now presents with LLE shooting pain.    Ortho spine f/up appreciated.  Pain control with Oxy IR  MRI L spine reviwed  Anxiety/Depression- Cw Welbutrin/Seroquel/Trazodone/Xanax  Bowel regimen    Based on patient's RCRI score of 0,  would consider him mild to moderate risk for planned procedure. However, patient is optimized from medical perspective to proceed.  No evidence of clinical HF or unstable cardiac symptoms.

## 2025-06-21 NOTE — PROGRESS NOTE ADULT - SUBJECTIVE AND OBJECTIVE BOX
Orthopedic Progress Note    Patient seen and examined at bedside.     Patient is s/p L2-S1 revision lami/PSF on 5/14/25    PRE-OP SYMPTOMS AND DIAGNOSIS: 75 yo male s/p revision laminectomy extension of spinal fusion at L23 and LLIF L23 from L3-S1 approximately 2 years ago, now Pseudarthrosis L5S1, with screw migration of Left L5, with foraminal stenos bilaterally, failed PT, NSAIDS, EDI, with inabilty to walk 5 feet, with cane, walker, has axial back pain with L>R radiculopathy, numbness and tingling.  Weakness of  TA, EHL, Peroneals 3/5 Left, 4/5 right.  Reduced L5 sensation L>R.  I reviewed surgical diagnosis and surgical treatment plan, which would be revision laminectomy with interbody at L5S1, with revision Posterior Spinal Fusion L2-Pelvis.    POST-OP SYMPTOMS: Patient reports RLE "nerve pain" and "pins and needles."    PHYSICAL EXAM:  Gen: Lying in bed, NAD  Motor:             C5           C6           C7           C8           T1   R           5/5         5/5          5/5          5/5          5/5  L            5/5         5/5          5/5          5/5          5/5              L2           L3           L4           L5           S1   R           5/5        5/5          5/5         5/5          5/5  L            5/5        5/5          5/5         5/5          5/5    Sensory:               C5         C6         C7         C8         T1  (0=absent, 1=impaired, 2=normal, NT=not testable)  R             2           2           2           2           2  L              2           2           2           2           2                 L2         L3         L4         L5         S1  (0=absent, 1=impaired, 2=normal, NT=not testable)  R             2           2           2           2           2  L              2           2           2           2           2  BLE: Calves soft and nontender  + DP pulse    PT/OT: 6/20/25- PT cleared for home with outpatient PT.

## 2025-06-23 RX ORDER — GABAPENTIN 300 MG/1
300 CAPSULE ORAL 3 TIMES DAILY
Qty: 270 | Refills: 0 | Status: ACTIVE | COMMUNITY
Start: 2025-06-23 | End: 1900-01-01

## 2025-06-28 ENCOUNTER — APPOINTMENT (OUTPATIENT)
Dept: CT IMAGING | Facility: CLINIC | Age: 75
End: 2025-06-28

## 2025-06-28 ENCOUNTER — APPOINTMENT (OUTPATIENT)
Dept: MRI IMAGING | Facility: CLINIC | Age: 75
End: 2025-06-28

## 2025-07-01 ENCOUNTER — NON-APPOINTMENT (OUTPATIENT)
Age: 75
End: 2025-07-01

## 2025-07-08 ENCOUNTER — INPATIENT (INPATIENT)
Facility: HOSPITAL | Age: 75
LOS: 3 days | Discharge: HOME | DRG: 556 | End: 2025-07-12
Attending: ORTHOPAEDIC SURGERY | Admitting: ORTHOPAEDIC SURGERY
Payer: MEDICARE

## 2025-07-08 ENCOUNTER — APPOINTMENT (OUTPATIENT)
Dept: ORTHOPEDIC SURGERY | Facility: CLINIC | Age: 75
End: 2025-07-08
Payer: MEDICARE

## 2025-07-08 VITALS — BODY MASS INDEX: 29.57 KG/M2 | WEIGHT: 184 LBS | HEIGHT: 66 IN

## 2025-07-08 VITALS
HEART RATE: 82 BPM | RESPIRATION RATE: 18 BRPM | OXYGEN SATURATION: 94 % | DIASTOLIC BLOOD PRESSURE: 90 MMHG | TEMPERATURE: 98 F | SYSTOLIC BLOOD PRESSURE: 146 MMHG

## 2025-07-08 DIAGNOSIS — Z98.1 ARTHRODESIS STATUS: Chronic | ICD-10-CM

## 2025-07-08 DIAGNOSIS — Z90.79 ACQUIRED ABSENCE OF OTHER GENITAL ORGAN(S): Chronic | ICD-10-CM

## 2025-07-08 DIAGNOSIS — E89.0 POSTPROCEDURAL HYPOTHYROIDISM: Chronic | ICD-10-CM

## 2025-07-08 DIAGNOSIS — E89.2 POSTPROCEDURAL HYPOPARATHYROIDISM: Chronic | ICD-10-CM

## 2025-07-08 DIAGNOSIS — Z98.890 OTHER SPECIFIED POSTPROCEDURAL STATES: Chronic | ICD-10-CM

## 2025-07-08 DIAGNOSIS — M79.81 NONTRAUMATIC HEMATOMA OF SOFT TISSUE: ICD-10-CM

## 2025-07-08 DIAGNOSIS — Z96.89 PRESENCE OF OTHER SPECIFIED FUNCTIONAL IMPLANTS: Chronic | ICD-10-CM

## 2025-07-08 DIAGNOSIS — Z90.49 ACQUIRED ABSENCE OF OTHER SPECIFIED PARTS OF DIGESTIVE TRACT: Chronic | ICD-10-CM

## 2025-07-08 DIAGNOSIS — Z98.49 CATARACT EXTRACTION STATUS, UNSPECIFIED EYE: Chronic | ICD-10-CM

## 2025-07-08 PROCEDURE — 99024 POSTOP FOLLOW-UP VISIT: CPT

## 2025-07-08 PROCEDURE — 72100 X-RAY EXAM L-S SPINE 2/3 VWS: CPT | Mod: PD

## 2025-07-08 PROCEDURE — 99223 1ST HOSP IP/OBS HIGH 75: CPT | Mod: FS,24

## 2025-07-08 RX ORDER — PREGABALIN 50 MG/1
100 CAPSULE ORAL
Refills: 0 | Status: DISCONTINUED | OUTPATIENT
Start: 2025-07-08 | End: 2025-07-12

## 2025-07-08 RX ORDER — POLYETHYLENE GLYCOL 3350 17 G/17G
17 POWDER, FOR SOLUTION ORAL AT BEDTIME
Refills: 0 | Status: DISCONTINUED | OUTPATIENT
Start: 2025-07-08 | End: 2025-07-12

## 2025-07-08 RX ORDER — GABAPENTIN 400 MG/1
800 CAPSULE ORAL AT BEDTIME
Refills: 0 | Status: DISCONTINUED | OUTPATIENT
Start: 2025-07-08 | End: 2025-07-09

## 2025-07-08 RX ORDER — SENNA 187 MG
2 TABLET ORAL AT BEDTIME
Refills: 0 | Status: DISCONTINUED | OUTPATIENT
Start: 2025-07-08 | End: 2025-07-12

## 2025-07-08 RX ORDER — BUPROPION HYDROBROMIDE 522 MG/1
300 TABLET, EXTENDED RELEASE ORAL DAILY
Refills: 0 | Status: DISCONTINUED | OUTPATIENT
Start: 2025-07-08 | End: 2025-07-12

## 2025-07-08 RX ORDER — MELATONIN 5 MG
3 TABLET ORAL AT BEDTIME
Refills: 0 | Status: DISCONTINUED | OUTPATIENT
Start: 2025-07-08 | End: 2025-07-12

## 2025-07-08 RX ORDER — QUETIAPINE FUMARATE 25 MG/1
300 TABLET ORAL DAILY
Refills: 0 | Status: DISCONTINUED | OUTPATIENT
Start: 2025-07-08 | End: 2025-07-12

## 2025-07-08 RX ORDER — HYDROMORPHONE/SOD CHLOR,ISO/PF 2 MG/10 ML
4 SYRINGE (ML) INJECTION EVERY 4 HOURS
Refills: 0 | Status: DISCONTINUED | OUTPATIENT
Start: 2025-07-08 | End: 2025-07-12

## 2025-07-08 RX ORDER — METHOCARBAMOL 500 MG/1
500 TABLET, FILM COATED ORAL EVERY 8 HOURS
Refills: 0 | Status: DISCONTINUED | OUTPATIENT
Start: 2025-07-08 | End: 2025-07-12

## 2025-07-08 RX ORDER — HYDROMORPHONE/SOD CHLOR,ISO/PF 2 MG/10 ML
2 SYRINGE (ML) INJECTION EVERY 4 HOURS
Refills: 0 | Status: DISCONTINUED | OUTPATIENT
Start: 2025-07-08 | End: 2025-07-12

## 2025-07-08 RX ORDER — ALPRAZOLAM 0.5 MG
1 TABLET, EXTENDED RELEASE 24 HR ORAL
Refills: 0 | Status: DISCONTINUED | OUTPATIENT
Start: 2025-07-08 | End: 2025-07-12

## 2025-07-08 RX ORDER — ACETAMINOPHEN 500 MG/5ML
975 LIQUID (ML) ORAL EVERY 8 HOURS
Refills: 0 | Status: DISCONTINUED | OUTPATIENT
Start: 2025-07-08 | End: 2025-07-12

## 2025-07-08 RX ORDER — TRAZODONE HCL 100 MG
100 TABLET ORAL AT BEDTIME
Refills: 0 | Status: DISCONTINUED | OUTPATIENT
Start: 2025-07-08 | End: 2025-07-12

## 2025-07-08 RX ORDER — LEVOTHYROXINE SODIUM 300 MCG
112 TABLET ORAL DAILY
Refills: 0 | Status: DISCONTINUED | OUTPATIENT
Start: 2025-07-08 | End: 2025-07-12

## 2025-07-08 RX ORDER — NALOXONE HYDROCHLORIDE 0.4 MG/ML
0.3 INJECTION, SOLUTION INTRAMUSCULAR; INTRAVENOUS; SUBCUTANEOUS
Refills: 0 | Status: DISCONTINUED | OUTPATIENT
Start: 2025-07-08 | End: 2025-07-12

## 2025-07-08 RX ORDER — MAGNESIUM HYDROXIDE 400 MG/5ML
30 SUSPENSION ORAL DAILY
Refills: 0 | Status: DISCONTINUED | OUTPATIENT
Start: 2025-07-08 | End: 2025-07-12

## 2025-07-09 LAB
ANION GAP SERPL CALC-SCNC: 15 MMOL/L — SIGNIFICANT CHANGE UP (ref 5–17)
ANION GAP SERPL CALC-SCNC: 15 MMOL/L — SIGNIFICANT CHANGE UP (ref 5–17)
APTT BLD: 28.7 SEC — SIGNIFICANT CHANGE UP (ref 26.1–36.8)
BUN SERPL-MCNC: 11 MG/DL — SIGNIFICANT CHANGE UP (ref 7–23)
BUN SERPL-MCNC: 12 MG/DL — SIGNIFICANT CHANGE UP (ref 7–23)
CALCIUM SERPL-MCNC: 8.6 MG/DL — SIGNIFICANT CHANGE UP (ref 8.4–10.5)
CALCIUM SERPL-MCNC: 9.5 MG/DL — SIGNIFICANT CHANGE UP (ref 8.4–10.5)
CHLORIDE SERPL-SCNC: 103 MMOL/L — SIGNIFICANT CHANGE UP (ref 96–108)
CHLORIDE SERPL-SCNC: 103 MMOL/L — SIGNIFICANT CHANGE UP (ref 96–108)
CO2 SERPL-SCNC: 20 MMOL/L — LOW (ref 22–31)
CO2 SERPL-SCNC: 25 MMOL/L — SIGNIFICANT CHANGE UP (ref 22–31)
CREAT SERPL-MCNC: 0.71 MG/DL — SIGNIFICANT CHANGE UP (ref 0.5–1.3)
CREAT SERPL-MCNC: 0.87 MG/DL — SIGNIFICANT CHANGE UP (ref 0.5–1.3)
EGFR: 90 ML/MIN/1.73M2 — SIGNIFICANT CHANGE UP
EGFR: 90 ML/MIN/1.73M2 — SIGNIFICANT CHANGE UP
EGFR: 96 ML/MIN/1.73M2 — SIGNIFICANT CHANGE UP
EGFR: 96 ML/MIN/1.73M2 — SIGNIFICANT CHANGE UP
GLUCOSE SERPL-MCNC: 205 MG/DL — HIGH (ref 70–99)
GLUCOSE SERPL-MCNC: 90 MG/DL — SIGNIFICANT CHANGE UP (ref 70–99)
HCT VFR BLD CALC: 38.3 % — LOW (ref 39–50)
HCT VFR BLD CALC: 42.3 % — SIGNIFICANT CHANGE UP (ref 39–50)
HGB BLD-MCNC: 11.7 G/DL — LOW (ref 13–17)
HGB BLD-MCNC: 12.7 G/DL — LOW (ref 13–17)
INR BLD: 0.88 RATIO — SIGNIFICANT CHANGE UP (ref 0.85–1.16)
MCHC RBC-ENTMCNC: 25.6 PG — LOW (ref 27–34)
MCHC RBC-ENTMCNC: 26 PG — LOW (ref 27–34)
MCHC RBC-ENTMCNC: 30 G/DL — LOW (ref 32–36)
MCHC RBC-ENTMCNC: 30.5 G/DL — LOW (ref 32–36)
MCV RBC AUTO: 85.1 FL — SIGNIFICANT CHANGE UP (ref 80–100)
MCV RBC AUTO: 85.3 FL — SIGNIFICANT CHANGE UP (ref 80–100)
NRBC # BLD AUTO: 0 K/UL — SIGNIFICANT CHANGE UP (ref 0–0)
NRBC # BLD AUTO: 0 K/UL — SIGNIFICANT CHANGE UP (ref 0–0)
NRBC # FLD: 0 K/UL — SIGNIFICANT CHANGE UP (ref 0–0)
NRBC # FLD: 0 K/UL — SIGNIFICANT CHANGE UP (ref 0–0)
NRBC BLD AUTO-RTO: 0 /100 WBCS — SIGNIFICANT CHANGE UP (ref 0–0)
NRBC BLD AUTO-RTO: 0 /100 WBCS — SIGNIFICANT CHANGE UP (ref 0–0)
PLATELET # BLD AUTO: 236 K/UL — SIGNIFICANT CHANGE UP (ref 150–400)
PLATELET # BLD AUTO: 247 K/UL — SIGNIFICANT CHANGE UP (ref 150–400)
PMV BLD: 10.2 FL — SIGNIFICANT CHANGE UP (ref 7–13)
PMV BLD: 9.9 FL — SIGNIFICANT CHANGE UP (ref 7–13)
POTASSIUM SERPL-MCNC: 4 MMOL/L — SIGNIFICANT CHANGE UP (ref 3.5–5.3)
POTASSIUM SERPL-MCNC: 4.2 MMOL/L — SIGNIFICANT CHANGE UP (ref 3.5–5.3)
POTASSIUM SERPL-SCNC: 4 MMOL/L — SIGNIFICANT CHANGE UP (ref 3.5–5.3)
POTASSIUM SERPL-SCNC: 4.2 MMOL/L — SIGNIFICANT CHANGE UP (ref 3.5–5.3)
PROTHROM AB SERPL-ACNC: 10 SEC — SIGNIFICANT CHANGE UP (ref 9.9–13.4)
RBC # BLD: 4.5 M/UL — SIGNIFICANT CHANGE UP (ref 4.2–5.8)
RBC # BLD: 4.96 M/UL — SIGNIFICANT CHANGE UP (ref 4.2–5.8)
RBC # FLD: 15.7 % — HIGH (ref 10.3–14.5)
RBC # FLD: 15.8 % — HIGH (ref 10.3–14.5)
SODIUM SERPL-SCNC: 138 MMOL/L — SIGNIFICANT CHANGE UP (ref 135–145)
SODIUM SERPL-SCNC: 143 MMOL/L — SIGNIFICANT CHANGE UP (ref 135–145)
WBC # BLD: 6.9 K/UL — SIGNIFICANT CHANGE UP (ref 3.8–10.5)
WBC # BLD: 8.06 K/UL — SIGNIFICANT CHANGE UP (ref 3.8–10.5)
WBC # FLD AUTO: 6.9 K/UL — SIGNIFICANT CHANGE UP (ref 3.8–10.5)
WBC # FLD AUTO: 8.06 K/UL — SIGNIFICANT CHANGE UP (ref 3.8–10.5)

## 2025-07-09 PROCEDURE — 93010 ELECTROCARDIOGRAM REPORT: CPT

## 2025-07-09 PROCEDURE — 99233 SBSQ HOSP IP/OBS HIGH 50: CPT | Mod: 24

## 2025-07-09 PROCEDURE — 86901 BLOOD TYPING SEROLOGIC RH(D): CPT

## 2025-07-09 PROCEDURE — 85027 COMPLETE CBC AUTOMATED: CPT

## 2025-07-09 PROCEDURE — 85610 PROTHROMBIN TIME: CPT

## 2025-07-09 PROCEDURE — 86900 BLOOD TYPING SEROLOGIC ABO: CPT

## 2025-07-09 PROCEDURE — 99223 1ST HOSP IP/OBS HIGH 75: CPT | Mod: GC

## 2025-07-09 PROCEDURE — 80048 BASIC METABOLIC PNL TOTAL CA: CPT

## 2025-07-09 PROCEDURE — 74176 CT ABD & PELVIS W/O CONTRAST: CPT

## 2025-07-09 PROCEDURE — 36415 COLL VENOUS BLD VENIPUNCTURE: CPT

## 2025-07-09 PROCEDURE — 87040 BLOOD CULTURE FOR BACTERIA: CPT

## 2025-07-09 PROCEDURE — 72148 MRI LUMBAR SPINE W/O DYE: CPT | Mod: 26

## 2025-07-09 PROCEDURE — 85730 THROMBOPLASTIN TIME PARTIAL: CPT

## 2025-07-09 PROCEDURE — 72146 MRI CHEST SPINE W/O DYE: CPT | Mod: 26

## 2025-07-09 PROCEDURE — 76498 UNLISTED MR PROCEDURE: CPT

## 2025-07-09 PROCEDURE — 86850 RBC ANTIBODY SCREEN: CPT

## 2025-07-09 PROCEDURE — 71045 X-RAY EXAM CHEST 1 VIEW: CPT | Mod: 26

## 2025-07-09 PROCEDURE — 74176 CT ABD & PELVIS W/O CONTRAST: CPT | Mod: 26

## 2025-07-09 PROCEDURE — 71045 X-RAY EXAM CHEST 1 VIEW: CPT

## 2025-07-09 PROCEDURE — 72141 MRI NECK SPINE W/O DYE: CPT | Mod: 26

## 2025-07-09 RX ORDER — METHYLPREDNISOLONE ACETATE 80 MG/ML
32 INJECTION, SUSPENSION INTRA-ARTICULAR; INTRALESIONAL; INTRAMUSCULAR; SOFT TISSUE ONCE
Refills: 0 | Status: COMPLETED | OUTPATIENT
Start: 2025-07-10 | End: 2025-07-10

## 2025-07-09 RX ORDER — DIPHENHYDRAMINE HCL 12.5MG/5ML
25 ELIXIR ORAL ONCE
Refills: 0 | Status: DISCONTINUED | OUTPATIENT
Start: 2025-07-09 | End: 2025-07-09

## 2025-07-09 RX ORDER — METHYLPREDNISOLONE ACETATE 80 MG/ML
32 INJECTION, SUSPENSION INTRA-ARTICULAR; INTRALESIONAL; INTRAMUSCULAR; SOFT TISSUE ONCE
Refills: 0 | Status: COMPLETED | OUTPATIENT
Start: 2025-07-09 | End: 2025-07-09

## 2025-07-09 RX ORDER — DIPHENHYDRAMINE HCL 12.5MG/5ML
50 ELIXIR ORAL ONCE
Refills: 0 | Status: COMPLETED | OUTPATIENT
Start: 2025-07-10 | End: 2025-07-10

## 2025-07-09 RX ORDER — GABAPENTIN 400 MG/1
800 CAPSULE ORAL THREE TIMES A DAY
Refills: 0 | Status: DISCONTINUED | OUTPATIENT
Start: 2025-07-09 | End: 2025-07-09

## 2025-07-09 RX ORDER — GABAPENTIN 400 MG/1
900 CAPSULE ORAL THREE TIMES A DAY
Refills: 0 | Status: DISCONTINUED | OUTPATIENT
Start: 2025-07-09 | End: 2025-07-12

## 2025-07-09 RX ADMIN — Medication 975 MILLIGRAM(S): at 05:29

## 2025-07-09 RX ADMIN — Medication 125 MILLILITER(S): at 07:51

## 2025-07-09 RX ADMIN — METHYLPREDNISOLONE ACETATE 32 MILLIGRAM(S): 80 INJECTION, SUSPENSION INTRA-ARTICULAR; INTRALESIONAL; INTRAMUSCULAR; SOFT TISSUE at 00:40

## 2025-07-09 RX ADMIN — Medication 112 MICROGRAM(S): at 05:28

## 2025-07-09 RX ADMIN — Medication 10 MILLIGRAM(S): at 05:28

## 2025-07-09 RX ADMIN — METHOCARBAMOL 500 MILLIGRAM(S): 500 TABLET, FILM COATED ORAL at 14:25

## 2025-07-09 RX ADMIN — PREGABALIN 100 MILLIGRAM(S): 50 CAPSULE ORAL at 11:23

## 2025-07-09 RX ADMIN — Medication 40 MILLIGRAM(S): at 05:28

## 2025-07-09 RX ADMIN — Medication 10 MILLIGRAM(S): at 18:27

## 2025-07-09 RX ADMIN — Medication 1 MILLIGRAM(S): at 05:41

## 2025-07-09 RX ADMIN — Medication 975 MILLIGRAM(S): at 15:25

## 2025-07-09 RX ADMIN — METHOCARBAMOL 500 MILLIGRAM(S): 500 TABLET, FILM COATED ORAL at 22:22

## 2025-07-09 RX ADMIN — Medication 975 MILLIGRAM(S): at 06:29

## 2025-07-09 RX ADMIN — Medication 975 MILLIGRAM(S): at 22:22

## 2025-07-09 RX ADMIN — GABAPENTIN 900 MILLIGRAM(S): 400 CAPSULE ORAL at 22:22

## 2025-07-09 RX ADMIN — METHOCARBAMOL 500 MILLIGRAM(S): 500 TABLET, FILM COATED ORAL at 05:28

## 2025-07-09 RX ADMIN — Medication 500 MILLIGRAM(S): at 11:22

## 2025-07-09 RX ADMIN — Medication 1 MILLIGRAM(S): at 20:28

## 2025-07-09 RX ADMIN — Medication 10 MILLIGRAM(S): at 11:38

## 2025-07-09 RX ADMIN — Medication 10 MILLIGRAM(S): at 22:22

## 2025-07-09 RX ADMIN — BUPROPION HYDROBROMIDE 300 MILLIGRAM(S): 522 TABLET, EXTENDED RELEASE ORAL at 11:22

## 2025-07-09 RX ADMIN — PREGABALIN 100 MILLIGRAM(S): 50 CAPSULE ORAL at 07:33

## 2025-07-09 RX ADMIN — Medication 100 MILLIGRAM(S): at 22:22

## 2025-07-09 RX ADMIN — Medication 975 MILLIGRAM(S): at 14:25

## 2025-07-09 RX ADMIN — GABAPENTIN 900 MILLIGRAM(S): 400 CAPSULE ORAL at 14:25

## 2025-07-09 RX ADMIN — QUETIAPINE FUMARATE 300 MILLIGRAM(S): 25 TABLET ORAL at 09:36

## 2025-07-10 LAB
24R-OH-CALCIDIOL SERPL-MCNC: 61.8 NG/ML — SIGNIFICANT CHANGE UP
B BURGDOR C6 AB SER-ACNC: NEGATIVE — SIGNIFICANT CHANGE UP
B BURGDOR IGG+IGM SER-ACNC: 0.1 INDEX — SIGNIFICANT CHANGE UP (ref 0.01–0.9)
CRP SERPL-MCNC: 12 MG/L — HIGH (ref 0–4)
ERYTHROCYTE [SEDIMENTATION RATE] IN BLOOD: 61 MM/HR — HIGH (ref 0–15)
FLUAV AG NPH QL: SIGNIFICANT CHANGE UP
FLUBV AG NPH QL: SIGNIFICANT CHANGE UP
RSV RNA NPH QL NAA+NON-PROBE: SIGNIFICANT CHANGE UP
SARS-COV-2 RNA SPEC QL NAA+PROBE: SIGNIFICANT CHANGE UP
SOURCE RESPIRATORY: SIGNIFICANT CHANGE UP
T3 SERPL-MCNC: 80 NG/DL — SIGNIFICANT CHANGE UP (ref 80–200)
T4 AB SER-ACNC: 4.5 UG/DL — LOW (ref 4.6–12)
TSH SERPL-MCNC: 5.6 UIU/ML — HIGH (ref 0.27–4.2)
VIT B12 SERPL-MCNC: 217 PG/ML — LOW (ref 232–1245)

## 2025-07-10 PROCEDURE — 99233 SBSQ HOSP IP/OBS HIGH 50: CPT | Mod: 24

## 2025-07-10 PROCEDURE — 70553 MRI BRAIN STEM W/O & W/DYE: CPT | Mod: 26

## 2025-07-10 PROCEDURE — 72158 MRI LUMBAR SPINE W/O & W/DYE: CPT | Mod: 26

## 2025-07-10 RX ORDER — CYANOCOBALAMIN 1000 UG/ML
1000 INJECTION INTRAMUSCULAR; SUBCUTANEOUS DAILY
Refills: 0 | Status: DISCONTINUED | OUTPATIENT
Start: 2025-07-10 | End: 2025-07-12

## 2025-07-10 RX ADMIN — QUETIAPINE FUMARATE 300 MILLIGRAM(S): 25 TABLET ORAL at 21:01

## 2025-07-10 RX ADMIN — METHOCARBAMOL 500 MILLIGRAM(S): 500 TABLET, FILM COATED ORAL at 23:57

## 2025-07-10 RX ADMIN — PREGABALIN 100 MILLIGRAM(S): 50 CAPSULE ORAL at 08:18

## 2025-07-10 RX ADMIN — BUPROPION HYDROBROMIDE 300 MILLIGRAM(S): 522 TABLET, EXTENDED RELEASE ORAL at 12:02

## 2025-07-10 RX ADMIN — GABAPENTIN 900 MILLIGRAM(S): 400 CAPSULE ORAL at 23:57

## 2025-07-10 RX ADMIN — METHOCARBAMOL 500 MILLIGRAM(S): 500 TABLET, FILM COATED ORAL at 05:32

## 2025-07-10 RX ADMIN — Medication 112 MICROGRAM(S): at 05:34

## 2025-07-10 RX ADMIN — Medication 975 MILLIGRAM(S): at 23:57

## 2025-07-10 RX ADMIN — METHOCARBAMOL 500 MILLIGRAM(S): 500 TABLET, FILM COATED ORAL at 14:32

## 2025-07-10 RX ADMIN — METHYLPREDNISOLONE ACETATE 32 MILLIGRAM(S): 80 INJECTION, SUSPENSION INTRA-ARTICULAR; INTRALESIONAL; INTRAMUSCULAR; SOFT TISSUE at 19:46

## 2025-07-10 RX ADMIN — Medication 50 MILLIGRAM(S): at 21:01

## 2025-07-10 RX ADMIN — Medication 975 MILLIGRAM(S): at 05:33

## 2025-07-10 RX ADMIN — Medication 100 MILLIGRAM(S): at 23:57

## 2025-07-10 RX ADMIN — Medication 10 MILLIGRAM(S): at 08:18

## 2025-07-10 RX ADMIN — Medication 1 MILLIGRAM(S): at 19:46

## 2025-07-10 RX ADMIN — Medication 975 MILLIGRAM(S): at 14:31

## 2025-07-10 RX ADMIN — PREGABALIN 100 MILLIGRAM(S): 50 CAPSULE ORAL at 12:01

## 2025-07-10 RX ADMIN — Medication 40 MILLIGRAM(S): at 05:33

## 2025-07-10 RX ADMIN — Medication 10 MILLIGRAM(S): at 23:57

## 2025-07-10 RX ADMIN — Medication 500 MILLIGRAM(S): at 12:01

## 2025-07-10 RX ADMIN — METHYLPREDNISOLONE ACETATE 32 MILLIGRAM(S): 80 INJECTION, SUSPENSION INTRA-ARTICULAR; INTRALESIONAL; INTRAMUSCULAR; SOFT TISSUE at 10:00

## 2025-07-10 RX ADMIN — Medication 975 MILLIGRAM(S): at 15:31

## 2025-07-10 RX ADMIN — Medication 1 MILLIGRAM(S): at 12:23

## 2025-07-10 RX ADMIN — Medication 1 MILLIGRAM(S): at 06:38

## 2025-07-10 RX ADMIN — GABAPENTIN 900 MILLIGRAM(S): 400 CAPSULE ORAL at 05:32

## 2025-07-10 RX ADMIN — Medication 2 TABLET(S): at 23:58

## 2025-07-10 RX ADMIN — Medication 10 MILLIGRAM(S): at 17:06

## 2025-07-10 RX ADMIN — Medication 10 MILLIGRAM(S): at 12:01

## 2025-07-10 RX ADMIN — GABAPENTIN 900 MILLIGRAM(S): 400 CAPSULE ORAL at 14:31

## 2025-07-11 LAB — APTT BLD: 29.2 SEC — SIGNIFICANT CHANGE UP (ref 26.1–36.8)

## 2025-07-11 PROCEDURE — 99233 SBSQ HOSP IP/OBS HIGH 50: CPT

## 2025-07-11 PROCEDURE — 99233 SBSQ HOSP IP/OBS HIGH 50: CPT | Mod: 24

## 2025-07-11 RX ORDER — DIPHENHYDRAMINE HCL 12.5MG/5ML
25 ELIXIR ORAL EVERY 12 HOURS
Refills: 0 | Status: DISCONTINUED | OUTPATIENT
Start: 2025-07-11 | End: 2025-07-12

## 2025-07-11 RX ADMIN — Medication 975 MILLIGRAM(S): at 05:11

## 2025-07-11 RX ADMIN — Medication 1 MILLIGRAM(S): at 23:07

## 2025-07-11 RX ADMIN — METHOCARBAMOL 500 MILLIGRAM(S): 500 TABLET, FILM COATED ORAL at 21:44

## 2025-07-11 RX ADMIN — METHOCARBAMOL 500 MILLIGRAM(S): 500 TABLET, FILM COATED ORAL at 13:22

## 2025-07-11 RX ADMIN — BUPROPION HYDROBROMIDE 300 MILLIGRAM(S): 522 TABLET, EXTENDED RELEASE ORAL at 12:30

## 2025-07-11 RX ADMIN — CYANOCOBALAMIN 1000 MICROGRAM(S): 1000 INJECTION INTRAMUSCULAR; SUBCUTANEOUS at 12:31

## 2025-07-11 RX ADMIN — Medication 112 MICROGRAM(S): at 05:12

## 2025-07-11 RX ADMIN — Medication 10 MILLIGRAM(S): at 16:55

## 2025-07-11 RX ADMIN — CYANOCOBALAMIN 1000 MICROGRAM(S): 1000 INJECTION INTRAMUSCULAR; SUBCUTANEOUS at 00:20

## 2025-07-11 RX ADMIN — Medication 50 MILLILITER(S): at 15:16

## 2025-07-11 RX ADMIN — PREGABALIN 100 MILLIGRAM(S): 50 CAPSULE ORAL at 08:58

## 2025-07-11 RX ADMIN — METHOCARBAMOL 500 MILLIGRAM(S): 500 TABLET, FILM COATED ORAL at 05:11

## 2025-07-11 RX ADMIN — Medication 10 MILLIGRAM(S): at 05:12

## 2025-07-11 RX ADMIN — Medication 10 MILLIGRAM(S): at 11:06

## 2025-07-11 RX ADMIN — Medication 25 MILLIGRAM(S): at 11:06

## 2025-07-11 RX ADMIN — Medication 975 MILLIGRAM(S): at 13:22

## 2025-07-11 RX ADMIN — GABAPENTIN 900 MILLIGRAM(S): 400 CAPSULE ORAL at 13:23

## 2025-07-11 RX ADMIN — QUETIAPINE FUMARATE 300 MILLIGRAM(S): 25 TABLET ORAL at 21:44

## 2025-07-11 RX ADMIN — Medication 975 MILLIGRAM(S): at 21:45

## 2025-07-11 RX ADMIN — GABAPENTIN 900 MILLIGRAM(S): 400 CAPSULE ORAL at 21:44

## 2025-07-11 RX ADMIN — PREGABALIN 100 MILLIGRAM(S): 50 CAPSULE ORAL at 12:30

## 2025-07-11 RX ADMIN — Medication 100 MILLIGRAM(S): at 21:45

## 2025-07-11 RX ADMIN — Medication 40 MILLIGRAM(S): at 05:11

## 2025-07-11 RX ADMIN — Medication 1 MILLIGRAM(S): at 11:12

## 2025-07-11 RX ADMIN — Medication 500 MILLIGRAM(S): at 12:30

## 2025-07-11 RX ADMIN — Medication 2 TABLET(S): at 21:44

## 2025-07-11 RX ADMIN — Medication 1 MILLIGRAM(S): at 05:23

## 2025-07-11 RX ADMIN — Medication 10 MILLIGRAM(S): at 21:44

## 2025-07-11 RX ADMIN — GABAPENTIN 900 MILLIGRAM(S): 400 CAPSULE ORAL at 05:11

## 2025-07-12 ENCOUNTER — TRANSCRIPTION ENCOUNTER (OUTPATIENT)
Age: 75
End: 2025-07-12

## 2025-07-12 VITALS
RESPIRATION RATE: 18 BRPM | SYSTOLIC BLOOD PRESSURE: 142 MMHG | DIASTOLIC BLOOD PRESSURE: 78 MMHG | OXYGEN SATURATION: 94 % | HEART RATE: 76 BPM | TEMPERATURE: 98 F

## 2025-07-12 PROCEDURE — 84207 ASSAY OF VITAMIN B-6: CPT

## 2025-07-12 PROCEDURE — 82306 VITAMIN D 25 HYDROXY: CPT

## 2025-07-12 PROCEDURE — 86140 C-REACTIVE PROTEIN: CPT

## 2025-07-12 PROCEDURE — 86789 WEST NILE VIRUS ANTIBODY: CPT

## 2025-07-12 PROCEDURE — 87637 SARSCOV2&INF A&B&RSV AMP PRB: CPT

## 2025-07-12 PROCEDURE — 70553 MRI BRAIN STEM W/O & W/DYE: CPT

## 2025-07-12 PROCEDURE — 82607 VITAMIN B-12: CPT

## 2025-07-12 PROCEDURE — 86900 BLOOD TYPING SEROLOGIC ABO: CPT

## 2025-07-12 PROCEDURE — 97162 PT EVAL MOD COMPLEX 30 MIN: CPT

## 2025-07-12 PROCEDURE — 85730 THROMBOPLASTIN TIME PARTIAL: CPT

## 2025-07-12 PROCEDURE — 74176 CT ABD & PELVIS W/O CONTRAST: CPT

## 2025-07-12 PROCEDURE — 87040 BLOOD CULTURE FOR BACTERIA: CPT

## 2025-07-12 PROCEDURE — 84425 ASSAY OF VITAMIN B-1: CPT

## 2025-07-12 PROCEDURE — A9585: CPT

## 2025-07-12 PROCEDURE — 85613 RUSSELL VIPER VENOM DILUTED: CPT

## 2025-07-12 PROCEDURE — 86901 BLOOD TYPING SEROLOGIC RH(D): CPT

## 2025-07-12 PROCEDURE — 85652 RBC SED RATE AUTOMATED: CPT

## 2025-07-12 PROCEDURE — 36415 COLL VENOUS BLD VENIPUNCTURE: CPT

## 2025-07-12 PROCEDURE — 85610 PROTHROMBIN TIME: CPT

## 2025-07-12 PROCEDURE — 97116 GAIT TRAINING THERAPY: CPT

## 2025-07-12 PROCEDURE — 83520 IMMUNOASSAY QUANT NOS NONAB: CPT

## 2025-07-12 PROCEDURE — 76498 UNLISTED MR PROCEDURE: CPT

## 2025-07-12 PROCEDURE — 86850 RBC ANTIBODY SCREEN: CPT

## 2025-07-12 PROCEDURE — 85027 COMPLETE CBC AUTOMATED: CPT

## 2025-07-12 PROCEDURE — 97110 THERAPEUTIC EXERCISES: CPT

## 2025-07-12 PROCEDURE — 86618 LYME DISEASE ANTIBODY: CPT

## 2025-07-12 PROCEDURE — 71045 X-RAY EXAM CHEST 1 VIEW: CPT

## 2025-07-12 PROCEDURE — 84436 ASSAY OF TOTAL THYROXINE: CPT

## 2025-07-12 PROCEDURE — 82525 ASSAY OF COPPER: CPT

## 2025-07-12 PROCEDURE — 84443 ASSAY THYROID STIM HORMONE: CPT

## 2025-07-12 PROCEDURE — 86788 WEST NILE VIRUS AB IGM: CPT

## 2025-07-12 PROCEDURE — 80048 BASIC METABOLIC PNL TOTAL CA: CPT

## 2025-07-12 PROCEDURE — 72158 MRI LUMBAR SPINE W/O & W/DYE: CPT

## 2025-07-12 PROCEDURE — 97165 OT EVAL LOW COMPLEX 30 MIN: CPT

## 2025-07-12 PROCEDURE — 99233 SBSQ HOSP IP/OBS HIGH 50: CPT | Mod: 24

## 2025-07-12 PROCEDURE — 84480 ASSAY TRIIODOTHYRONINE (T3): CPT

## 2025-07-12 PROCEDURE — 93005 ELECTROCARDIOGRAM TRACING: CPT

## 2025-07-12 RX ORDER — CYANOCOBALAMIN 1000 UG/ML
1000 INJECTION INTRAMUSCULAR; SUBCUTANEOUS
Qty: 11 | Refills: 0
Start: 2025-07-12

## 2025-07-12 RX ORDER — CYANOCOBALAMIN 1000 UG/ML
1000 INJECTION INTRAMUSCULAR; SUBCUTANEOUS
Refills: 0
Start: 2025-07-12

## 2025-07-12 RX ADMIN — Medication 500 MILLIGRAM(S): at 12:10

## 2025-07-12 RX ADMIN — GABAPENTIN 900 MILLIGRAM(S): 400 CAPSULE ORAL at 14:30

## 2025-07-12 RX ADMIN — Medication 112 MICROGRAM(S): at 05:12

## 2025-07-12 RX ADMIN — Medication 975 MILLIGRAM(S): at 05:12

## 2025-07-12 RX ADMIN — METHOCARBAMOL 500 MILLIGRAM(S): 500 TABLET, FILM COATED ORAL at 05:12

## 2025-07-12 RX ADMIN — Medication 10 MILLIGRAM(S): at 05:12

## 2025-07-12 RX ADMIN — PREGABALIN 100 MILLIGRAM(S): 50 CAPSULE ORAL at 08:12

## 2025-07-12 RX ADMIN — Medication 975 MILLIGRAM(S): at 15:30

## 2025-07-12 RX ADMIN — METHOCARBAMOL 500 MILLIGRAM(S): 500 TABLET, FILM COATED ORAL at 14:30

## 2025-07-12 RX ADMIN — PREGABALIN 100 MILLIGRAM(S): 50 CAPSULE ORAL at 12:11

## 2025-07-12 RX ADMIN — Medication 40 MILLIGRAM(S): at 05:12

## 2025-07-12 RX ADMIN — BUPROPION HYDROBROMIDE 300 MILLIGRAM(S): 522 TABLET, EXTENDED RELEASE ORAL at 12:10

## 2025-07-12 RX ADMIN — Medication 975 MILLIGRAM(S): at 14:30

## 2025-07-12 RX ADMIN — Medication 1 MILLIGRAM(S): at 08:13

## 2025-07-12 RX ADMIN — CYANOCOBALAMIN 1000 MICROGRAM(S): 1000 INJECTION INTRAMUSCULAR; SUBCUTANEOUS at 12:10

## 2025-07-12 RX ADMIN — GABAPENTIN 900 MILLIGRAM(S): 400 CAPSULE ORAL at 05:12

## 2025-07-12 RX ADMIN — Medication 10 MILLIGRAM(S): at 12:10

## 2025-07-13 LAB
COPPER RBC-MCNC: 84.6 MCG/DL — SIGNIFICANT CHANGE UP (ref 59–91)
DRVVT RATIO: 0.95 RATIO — SIGNIFICANT CHANGE UP (ref 0–1.21)
DRVVT SCREEN TO CONFIRM RATIO: SIGNIFICANT CHANGE UP
NORMALIZED SCT PPP-RTO: 0.99 RATIO — SIGNIFICANT CHANGE UP (ref 0–1.16)
NORMALIZED SCT PPP-RTO: SIGNIFICANT CHANGE UP
VIT B1 SERPL-MCNC: 94.9 NMOL/L — SIGNIFICANT CHANGE UP (ref 66.5–200)
WNV IGG TITR FLD: NEGATIVE — SIGNIFICANT CHANGE UP
WNV IGM SPEC QL: NEGATIVE — SIGNIFICANT CHANGE UP

## 2025-07-14 LAB
CULTURE RESULTS: SIGNIFICANT CHANGE UP
CULTURE RESULTS: SIGNIFICANT CHANGE UP
PYRIDOXAL PHOS SERPL-MCNC: 5.4 UG/L — SIGNIFICANT CHANGE UP (ref 3.4–65.2)
SPECIMEN SOURCE: SIGNIFICANT CHANGE UP
SPECIMEN SOURCE: SIGNIFICANT CHANGE UP

## 2025-07-17 ENCOUNTER — RX RENEWAL (OUTPATIENT)
Age: 75
End: 2025-07-17

## 2025-07-17 LAB — GANGLIOSIDE GQ1B IGG ANTIBODY RESULT: SIGNIFICANT CHANGE UP TITER

## 2025-08-12 ENCOUNTER — NON-APPOINTMENT (OUTPATIENT)
Age: 75
End: 2025-08-12

## 2025-08-12 ENCOUNTER — APPOINTMENT (OUTPATIENT)
Dept: ORTHOPEDIC SURGERY | Facility: CLINIC | Age: 75
End: 2025-08-12
Payer: MEDICARE

## 2025-08-12 VITALS — BODY MASS INDEX: 29.57 KG/M2 | HEIGHT: 66 IN | WEIGHT: 184 LBS

## 2025-08-12 DIAGNOSIS — M25.561 PAIN IN RIGHT KNEE: ICD-10-CM

## 2025-08-12 DIAGNOSIS — T84.84XA PAIN DUE TO INTERNAL ORTHOPEDIC PROSTHETIC DEVICES, IMPLANTS AND GRAFTS, INITIAL ENCOUNTER: ICD-10-CM

## 2025-08-12 DIAGNOSIS — E53.8 DEFICIENCY OF OTHER SPECIFIED B GROUP VITAMINS: ICD-10-CM

## 2025-08-12 DIAGNOSIS — M51.369: ICD-10-CM

## 2025-08-12 DIAGNOSIS — Z98.1 ARTHRODESIS STATUS: ICD-10-CM

## 2025-08-12 PROCEDURE — 72100 X-RAY EXAM L-S SPINE 2/3 VWS: CPT

## 2025-08-12 PROCEDURE — 99024 POSTOP FOLLOW-UP VISIT: CPT

## 2025-09-09 ENCOUNTER — APPOINTMENT (OUTPATIENT)
Dept: ORTHOPEDIC SURGERY | Facility: CLINIC | Age: 75
End: 2025-09-09
Payer: MEDICARE

## 2025-09-09 VITALS — WEIGHT: 184 LBS | BODY MASS INDEX: 29.57 KG/M2 | HEIGHT: 66 IN

## 2025-09-09 DIAGNOSIS — Z98.1 ARTHRODESIS STATUS: ICD-10-CM

## 2025-09-09 PROCEDURE — 72100 X-RAY EXAM L-S SPINE 2/3 VWS: CPT

## 2025-09-09 PROCEDURE — 99214 OFFICE O/P EST MOD 30 MIN: CPT

## (undated) DEVICE — DRSG AQUACEL 3.5 X 12"

## (undated) DEVICE — POSITIONER FOAM EGG CRATE ULNAR 2PCS (PINK)

## (undated) DEVICE — DRAPE C ARM C-ARMOUR

## (undated) DEVICE — SPECIMEN CONTAINER 100ML

## (undated) DEVICE — SUT BIOSYN 4-0 18" P-12

## (undated) DEVICE — DRAIN JACKSON PRATT 3 SPRING RESERVOIR W 10FR PVC DRAIN

## (undated) DEVICE — SUT POLYSORB 2-0 30" V-20 UNDYED

## (undated) DEVICE — SUT PROLENE 4-0 36" SH

## (undated) DEVICE — ELCTR HEX BLADE

## (undated) DEVICE — DRAPE MAYO STAND 30"

## (undated) DEVICE — NDL SPINAL 18G X 3.5" (PINK)

## (undated) DEVICE — DRAPE LIGHT HANDLE COVER (BLUE)

## (undated) DEVICE — PREP CHLORAPREP HI-LITE ORANGE 26ML

## (undated) DEVICE — SOL IRR POUR NS 0.9% 500ML

## (undated) DEVICE — SPHERE MARKER (5 SPHERES)

## (undated) DEVICE — MAZOR X SPINE DISP KIT

## (undated) DEVICE — VESSEL LOOP MAXI-RED  0.120" X 16"

## (undated) DEVICE — Device

## (undated) DEVICE — SUT SILK 0 30" TIES

## (undated) DEVICE — STAPLER SKIN VISI-STAT 35 WIDE

## (undated) DEVICE — ELCTR BOVIE TIP BLADE INSULATED 2.75" EDGE

## (undated) DEVICE — DRSG OPSITE 13.75 X 4"

## (undated) DEVICE — WARMING BLANKET UPPER ADULT

## (undated) DEVICE — DRAPE TOWEL BLUE 17" X 24"

## (undated) DEVICE — DILATOR NIM STIM

## (undated) DEVICE — DRAPE 3/4 SHEET W REINFORCEMENT 56X77"

## (undated) DEVICE — DRAIN CHANNEL 19FR ROUND FULL FLUTED

## (undated) DEVICE — GLV 7 PROTEXIS (WHITE)

## (undated) DEVICE — WARMING BLANKET LOWER ADULT

## (undated) DEVICE — DRAPE IOBAN 23" X 23"

## (undated) DEVICE — PACK LUMBAR LAMI

## (undated) DEVICE — DEVICE BONE DUST COLLECTION

## (undated) DEVICE — FOLEY TRAY 16FR 5CC LTX UMETER CLOSED

## (undated) DEVICE — SUT VICRYL PLUS 2-0 18" CP-2 UNDYED (POP-OFF)

## (undated) DEVICE — DRAPE C ARM UNIVERSAL

## (undated) DEVICE — BIPOLAR FORCEP SYMMETRY BAYONET 7" X 1.5MM SMOOTH (SILVER)

## (undated) DEVICE — BLADE SCALPEL SAFETYLOCK #11

## (undated) DEVICE — GLV 8.5 PROTEXIS (WHITE)

## (undated) DEVICE — MAZOR MIDAS REX MR8 3.0MM X 30MM X 23CM

## (undated) DEVICE — VISITEC 4X4

## (undated) DEVICE — STRYKER INTERPULSE HANDPIECE W IRR SUCTION TUBE

## (undated) DEVICE — ELCTR BOVIE TIP BLADE VALLEYLAB 6.5"

## (undated) DEVICE — SUT PLEDGET PRE PUNCH 4.8 X 9.5 X 1.5 MM

## (undated) DEVICE — GLV 8 PROTEXIS (WHITE)

## (undated) DEVICE — POSITIONER CUSHION INSERT PRONE VIEW LG

## (undated) DEVICE — DRAPE EQUIPMENT BANDED BAG 30 X 30" (SHOWER CAP)

## (undated) DEVICE — ELCTR BOVIE PENCIL HANDPIECE

## (undated) DEVICE — DRAPE 1/2 SHEET 40X57"

## (undated) DEVICE — ELCTR BOVIE PENCIL SMOKE EVACUATION

## (undated) DEVICE — SUT PDS II 1 54" TP-1

## (undated) DEVICE — ELCTR BIPOLAR CORD J&J 12FT DISP

## (undated) DEVICE — DRSG TEGADERM 6 X 8"

## (undated) DEVICE — DISSECTOR ENDO PEANUT 5MM

## (undated) DEVICE — VENODYNE/SCD SLEEVE CALF MEDIUM

## (undated) DEVICE — MIDAS REX MR8 MATCH HEAD FLUTED LG BORE 3MM X 14CM

## (undated) DEVICE — DRAPE INSTRUMENT POUCH 6.75" X 11"

## (undated) DEVICE — SYR LUER LOK 20CC

## (undated) DEVICE — DRAIN JACKSON PRATT 10MM FLAT FULL NO TROCAR

## (undated) DEVICE — GOWN TRIMAX LG

## (undated) DEVICE — SUT SOFSILK 0 30" TIES

## (undated) DEVICE — DRSG TEGADERM 6"X8"

## (undated) DEVICE — SUT MONOCRYL 4-0 27" PS-2 UNDYED

## (undated) DEVICE — SOL IRR BAG NS 0.9% 3000ML

## (undated) DEVICE — DRAIN RESERVOIR FOR JACKSON PRATT 100CC CARDINAL

## (undated) DEVICE — GLV 7.5 PROTEXIS (WHITE)

## (undated) DEVICE — SYR LUER LOK 10CC

## (undated) DEVICE — SOL IRR POUR H2O 250ML

## (undated) DEVICE — POSITIONER JACKSON TABLE HEADREST 7", CHEST, HIP, THIGH PADS

## (undated) DEVICE — SUT POLYSORB 2-0 36" GS-24 UNDYED

## (undated) DEVICE — ELCTR BOVIE TIP BLADE INSULATED 6.5" EDGE

## (undated) DEVICE — DRSG STERISTRIPS 0.5 X 4"

## (undated) DEVICE — NDL HYPO SAFE 18G X 1.5" (PINK)

## (undated) DEVICE — BIPOLAR FORCEP MEDTRONIC BAYONET STRAIGHT

## (undated) DEVICE — POSITIONER FOAM HEADREST (PINK)

## (undated) DEVICE — VAGINAL PACKING 2 X 6"

## (undated) DEVICE — WOUND IRR SURGIPHOR

## (undated) DEVICE — GLV 6.5 PROTEXIS (WHITE)

## (undated) DEVICE — SUCTION YANKAUER NO CONTROL VENT

## (undated) DEVICE — BLADE SCALPEL SAFETYLOCK #15

## (undated) DEVICE — DRSG DERMABOND 0.7ML

## (undated) DEVICE — SUT MAXON 1 96" T-60

## (undated) DEVICE — MIDAS REX LEGEND LUBRICANT DIFFUSER CARTRIDGE

## (undated) DEVICE — NDL HYPO SAFE 22G X 1.5" (BLACK)

## (undated) DEVICE — SYR ASEPTO

## (undated) DEVICE — DRSG TAPE TRANSPORE 3"

## (undated) DEVICE — SUT POLYSORB 3-0 30" V-20 UNDYED

## (undated) DEVICE — SUT VICRYL PLUS 0 18" OS-6 (POP-OFF)

## (undated) DEVICE — DRSG DERMABOND PRINEO 60CM

## (undated) DEVICE — MARKING PEN W RULER

## (undated) DEVICE — LAP PAD 18 X 18"

## (undated) DEVICE — VENODYNE/SCD SLEEVE CALF LARGE

## (undated) DEVICE — NDL COUNTER FOAM AND MAGNET 40-70

## (undated) DEVICE — DRSG TELFA 3 X 8

## (undated) DEVICE — MEDICATION LABELS W MARKER

## (undated) DEVICE — SUT PROLENE 3-0 36" SH

## (undated) DEVICE — MAZOR MIDAS REX MR8 3.0MM X 30MM X 31CM

## (undated) DEVICE — SUT SOFSILK 2-0 30" TIES

## (undated) DEVICE — MIDAS REX LEGEND MATCH HEAD FLUTED LG BORE 3.0MM X 14CM

## (undated) DEVICE — BLADE SCALPEL SAFETYLOCK #10

## (undated) DEVICE — DRAPE MAGNETIC INSTRUMENT MEDIUM

## (undated) DEVICE — MIDAS REX LEGEND BALL FLUTED LG BORE 6.0MM X 21CM

## (undated) DEVICE — GLV 9 DURAPRENE